# Patient Record
Sex: MALE | Race: WHITE | NOT HISPANIC OR LATINO | Employment: OTHER | URBAN - METROPOLITAN AREA
[De-identification: names, ages, dates, MRNs, and addresses within clinical notes are randomized per-mention and may not be internally consistent; named-entity substitution may affect disease eponyms.]

---

## 2017-01-30 DIAGNOSIS — E78.5 HYPERLIPIDEMIA, UNSPECIFIED HYPERLIPIDEMIA TYPE: Primary | ICD-10-CM

## 2017-01-30 DIAGNOSIS — J44.9 CHRONIC OBSTRUCTIVE PULMONARY DISEASE, UNSPECIFIED COPD TYPE (HCC): ICD-10-CM

## 2017-01-30 DIAGNOSIS — E03.8 SUBCLINICAL HYPOTHYROIDISM: ICD-10-CM

## 2017-01-30 DIAGNOSIS — I10 BENIGN ESSENTIAL HYPERTENSION: ICD-10-CM

## 2017-02-02 LAB
ALBUMIN SERPL-MCNC: 4 G/DL (ref 3.5–5.2)
ALBUMIN/GLOB SERPL: 1.3 G/DL
ALP SERPL-CCNC: 49 U/L (ref 39–117)
ALT SERPL-CCNC: 25 U/L (ref 1–41)
AST SERPL-CCNC: 35 U/L (ref 1–40)
BASOPHILS # BLD AUTO: 0.02 10*3/MM3 (ref 0–0.2)
BASOPHILS NFR BLD AUTO: 0.3 % (ref 0–1.5)
BILIRUB SERPL-MCNC: 1.2 MG/DL (ref 0.1–1.2)
BUN SERPL-MCNC: 17 MG/DL (ref 8–23)
BUN/CREAT SERPL: 20 (ref 7–25)
CALCIUM SERPL-MCNC: 9.3 MG/DL (ref 8.6–10.5)
CHLORIDE SERPL-SCNC: 100 MMOL/L (ref 98–107)
CHOLEST SERPL-MCNC: 144 MG/DL (ref 0–200)
CO2 SERPL-SCNC: 23.6 MMOL/L (ref 22–29)
CREAT SERPL-MCNC: 0.85 MG/DL (ref 0.76–1.27)
EOSINOPHIL # BLD AUTO: 0.64 10*3/MM3 (ref 0–0.7)
EOSINOPHIL NFR BLD AUTO: 11 % (ref 0.3–6.2)
ERYTHROCYTE [DISTWIDTH] IN BLOOD BY AUTOMATED COUNT: 14.5 % (ref 11.5–14.5)
GLOBULIN SER CALC-MCNC: 3.2 GM/DL
GLUCOSE SERPL-MCNC: 104 MG/DL (ref 65–99)
HCT VFR BLD AUTO: 44 % (ref 40.4–52.2)
HDLC SERPL-MCNC: 63 MG/DL (ref 40–60)
HGB BLD-MCNC: 15 G/DL (ref 13.7–17.6)
IMM GRANULOCYTES # BLD: 0 10*3/MM3 (ref 0–0.03)
IMM GRANULOCYTES NFR BLD: 0 % (ref 0–0.5)
LDLC SERPL CALC-MCNC: 71 MG/DL (ref 0–100)
LDLC/HDLC SERPL: 1.12 {RATIO}
LYMPHOCYTES # BLD AUTO: 1.88 10*3/MM3 (ref 0.9–4.8)
LYMPHOCYTES NFR BLD AUTO: 32.4 % (ref 19.6–45.3)
MCH RBC QN AUTO: 33 PG (ref 27–32.7)
MCHC RBC AUTO-ENTMCNC: 34.1 G/DL (ref 32.6–36.4)
MCV RBC AUTO: 96.9 FL (ref 79.8–96.2)
MONOCYTES # BLD AUTO: 1.16 10*3/MM3 (ref 0.2–1.2)
MONOCYTES NFR BLD AUTO: 20 % (ref 5–12)
NEUTROPHILS # BLD AUTO: 2.1 10*3/MM3 (ref 1.9–8.1)
NEUTROPHILS NFR BLD AUTO: 36.3 % (ref 42.7–76)
PLATELET # BLD AUTO: 279 10*3/MM3 (ref 140–500)
POTASSIUM SERPL-SCNC: 4.2 MMOL/L (ref 3.5–5.2)
PROT SERPL-MCNC: 7.2 G/DL (ref 6–8.5)
RBC # BLD AUTO: 4.54 10*6/MM3 (ref 4.6–6)
SODIUM SERPL-SCNC: 139 MMOL/L (ref 136–145)
TRIGL SERPL-MCNC: 52 MG/DL (ref 0–150)
TSH SERPL DL<=0.005 MIU/L-ACNC: 3.83 MIU/ML (ref 0.27–4.2)
VLDLC SERPL CALC-MCNC: 10.4 MG/DL (ref 5–40)
WBC # BLD AUTO: 5.8 10*3/MM3 (ref 4.5–10.7)

## 2017-02-07 ENCOUNTER — OFFICE VISIT (OUTPATIENT)
Dept: INTERNAL MEDICINE | Facility: CLINIC | Age: 82
End: 2017-02-07

## 2017-02-07 VITALS
DIASTOLIC BLOOD PRESSURE: 60 MMHG | WEIGHT: 208 LBS | SYSTOLIC BLOOD PRESSURE: 130 MMHG | BODY MASS INDEX: 29.01 KG/M2 | HEART RATE: 77 BPM | OXYGEN SATURATION: 98 %

## 2017-02-07 DIAGNOSIS — E78.5 HYPERLIPIDEMIA, UNSPECIFIED HYPERLIPIDEMIA TYPE: ICD-10-CM

## 2017-02-07 DIAGNOSIS — I10 BENIGN ESSENTIAL HYPERTENSION: ICD-10-CM

## 2017-02-07 DIAGNOSIS — R06.09 EXERTIONAL DYSPNEA: Primary | ICD-10-CM

## 2017-02-07 DIAGNOSIS — J44.9 CHRONIC OBSTRUCTIVE PULMONARY DISEASE, UNSPECIFIED COPD TYPE (HCC): ICD-10-CM

## 2017-02-07 PROCEDURE — 99214 OFFICE O/P EST MOD 30 MIN: CPT | Performed by: INTERNAL MEDICINE

## 2017-02-07 RX ORDER — FLUTICASONE PROPIONATE 50 MCG
2 SPRAY, SUSPENSION (ML) NASAL DAILY
Qty: 1 EACH | Refills: 0 | Status: SHIPPED | OUTPATIENT
Start: 2017-02-07 | End: 2017-03-09

## 2017-02-07 NOTE — PROGRESS NOTES
Chief Complaint   Patient presents with   • Hypertension   • COPD     Htn, copd, hyperlipidemia  History of Present Illness   Cuauhtemoc Buck is a 81 y.o. male presents for routine follow up evaluation. He has COPD. This is managed w/ multiple inhalers. He does have some sinus congestion today but denies sinus pain. He took levaquin for a sinus infection about one week ago w/ good benefit and is now almost resolved. Recently switched from spiriva/ symbicort to anoro. He reports that he does feel some exercise fatigue after changing medications. He engages in fitness routinely at [employer]. He is tracking his fitness and he is walking 11,00-17,000 steps daily w/ about 6.5-7 miles daily. Reports that he can not do this as fast as he used to due to some dyspnea. bp has been normotensive. Labs evaluated and lipids are at goal.         The following portions of the patient's history were reviewed and updated as appropriate: allergies, current medications, past family history, past medical history, past social history, past surgical history and problem list.  Current Outpatient Prescriptions on File Prior to Visit   Medication Sig Dispense Refill   • albuterol (PROVENTIL HFA) 108 (90 BASE) MCG/ACT inhaler Proventil  (90 Base) MCG/ACT Inhalation Aerosol Solution; Patient Sig: Proventil  (90 Base) MCG/ACT Inhalation Aerosol Solution INHALE TWO PUFFS BY MOUTH EVERY 6 HOURS AS NEEDED FOR SHORTNESS OF AIR; 60; 6; 27-Apr-2015; Active     • atorvastatin (LIPITOR) 20 MG tablet TAKE ONE TABLET BY MOUTH DAILY 30 tablet 3   • azelastine (OPTIVAR) 0.05 % ophthalmic solution Apply  to eye every 12 (twelve) hours.     • Coenzyme Q10 (CO Q-10) 100 MG capsule Take  by mouth.     • fluticasone-salmeterol (ADVAIR DISKUS) 250-50 MCG/DOSE DISKUS 2 (two) times a day.     • losartan-hydrochlorothiazide (HYZAAR) 100-12.5 MG per tablet TAKE ONE TABLET BY MOUTH DAILY 30 tablet 6   • nystatin (MYCOSTATIN) 483680 UNIT/ML suspension  Swish and swallow 5 mL 4 (four) times a day. 240 mL 0   • SPIRIVA HANDIHALER 18 MCG per inhalation capsule INHALE THE ENTIRE CONTENTS OF 1 CAPSULE ONCE A DAY USING HANDIHALER DEVICE 30 capsule 10   • vitamin B-12 (CYANOCOBALAMIN) 100 MCG tablet Take  by mouth.       No current facility-administered medications on file prior to visit.      Review of Systems   Constitutional: Negative.    HENT: Positive for rhinorrhea, sinus pressure and sore throat.    Eyes: Negative.    Respiratory: Positive for shortness of breath.    Cardiovascular: Negative.    Gastrointestinal: Negative.    Endocrine: Negative.    Genitourinary: Negative.    Musculoskeletal: Negative.    Skin: Negative.    Allergic/Immunologic: Negative.    Neurological: Negative.    Hematological: Negative.    Psychiatric/Behavioral: Negative.        Objective   Physical Exam   Constitutional: He is oriented to person, place, and time. He appears well-developed and well-nourished.   HENT:   Head: Normocephalic and atraumatic.   Right Ear: External ear normal.   Left Ear: External ear normal.   Nose: Nose normal.   Mouth/Throat: Oropharynx is clear and moist.   Nasal congestion  Pharyngeal erythema   Eyes: Conjunctivae and EOM are normal. Pupils are equal, round, and reactive to light.   Neck: Normal range of motion. Neck supple.   Cardiovascular: Normal rate, regular rhythm, normal heart sounds and intact distal pulses.    Pulmonary/Chest: Effort normal.   Stable/ clear   Abdominal: Soft. Bowel sounds are normal.   Musculoskeletal: Normal range of motion.   Neurological: He is alert and oriented to person, place, and time. He has normal reflexes.   Skin: Skin is warm and dry.   Psychiatric: He has a normal mood and affect. His behavior is normal. Judgment and thought content normal.   Nursing note and vitals reviewed.       Visit Vitals   • /60   • Pulse 77   • Wt 208 lb (94.3 kg)   • SpO2 98%   • BMI 29.01 kg/m2       Assessment/Plan   Diagnoses and all  orders for this visit:    Exertional dyspnea  -     Ambulatory Referral to Cardiology    Chronic obstructive pulmonary disease, unspecified COPD type    Hyperlipidemia, unspecified hyperlipidemia type    Benign essential hypertension    Other orders  -     fluticasone (FLONASE) 50 MCG/ACT nasal spray; 2 sprays into each nostril Daily for 30 days.        Patient w/ advanced copd. He is doing fairly well w/ his current regiment. Some recent exertional dyspnea. This could be to his change in inhalers. However, he has not had any recent cardiac testing and will have him follow up w/ his cardiologist to ensure this is not in play given h/o tob, htn, and hyperlip. He does have some sinus congestion and he is to restart using flonase to see if this helps w/ breathing. He will continue routine fitness as tolerated and continue his current medications. Labs reviewed. All at goal. Follow up routinely.

## 2017-02-28 ENCOUNTER — OFFICE VISIT (OUTPATIENT)
Dept: CARDIOLOGY | Facility: CLINIC | Age: 82
End: 2017-02-28

## 2017-02-28 VITALS
WEIGHT: 205 LBS | HEIGHT: 71 IN | DIASTOLIC BLOOD PRESSURE: 70 MMHG | HEART RATE: 73 BPM | SYSTOLIC BLOOD PRESSURE: 110 MMHG | RESPIRATION RATE: 16 BRPM | BODY MASS INDEX: 28.7 KG/M2

## 2017-02-28 DIAGNOSIS — J44.9 CHRONIC OBSTRUCTIVE PULMONARY DISEASE, UNSPECIFIED COPD TYPE (HCC): ICD-10-CM

## 2017-02-28 DIAGNOSIS — I10 BENIGN ESSENTIAL HYPERTENSION: ICD-10-CM

## 2017-02-28 DIAGNOSIS — E78.5 HYPERLIPIDEMIA, UNSPECIFIED HYPERLIPIDEMIA TYPE: ICD-10-CM

## 2017-02-28 DIAGNOSIS — R06.09 DYSPNEA ON EXERTION: Primary | ICD-10-CM

## 2017-02-28 PROCEDURE — 93000 ELECTROCARDIOGRAM COMPLETE: CPT | Performed by: INTERNAL MEDICINE

## 2017-02-28 PROCEDURE — 99203 OFFICE O/P NEW LOW 30 MIN: CPT | Performed by: INTERNAL MEDICINE

## 2017-04-25 RX ORDER — ATORVASTATIN CALCIUM 20 MG/1
TABLET, FILM COATED ORAL
Qty: 30 TABLET | Refills: 2 | Status: SHIPPED | OUTPATIENT
Start: 2017-04-25 | End: 2017-07-24 | Stop reason: SDUPTHER

## 2017-05-30 RX ORDER — LOSARTAN POTASSIUM AND HYDROCHLOROTHIAZIDE 12.5; 1 MG/1; MG/1
TABLET ORAL
Qty: 30 TABLET | Refills: 5 | Status: SHIPPED | OUTPATIENT
Start: 2017-05-30 | End: 2017-12-05 | Stop reason: SDUPTHER

## 2017-06-27 RX ORDER — TIOTROPIUM BROMIDE 18 UG/1
CAPSULE ORAL; RESPIRATORY (INHALATION)
Qty: 30 CAPSULE | Refills: 9 | Status: SHIPPED | OUTPATIENT
Start: 2017-06-27 | End: 2018-11-17 | Stop reason: SDUPTHER

## 2017-07-23 DIAGNOSIS — Z00.00 HEALTHCARE MAINTENANCE: ICD-10-CM

## 2017-07-23 DIAGNOSIS — E78.5 HYPERLIPIDEMIA, UNSPECIFIED HYPERLIPIDEMIA TYPE: Primary | ICD-10-CM

## 2017-07-23 DIAGNOSIS — R73.9 HYPERGLYCEMIA: ICD-10-CM

## 2017-07-23 DIAGNOSIS — I10 BENIGN ESSENTIAL HYPERTENSION: ICD-10-CM

## 2017-07-23 DIAGNOSIS — J44.9 CHRONIC OBSTRUCTIVE PULMONARY DISEASE, UNSPECIFIED COPD TYPE (HCC): ICD-10-CM

## 2017-07-23 DIAGNOSIS — N40.0 BENIGN PROSTATIC HYPERPLASIA, PRESENCE OF LOWER URINARY TRACT SYMPTOMS UNSPECIFIED, UNSPECIFIED MORPHOLOGY: ICD-10-CM

## 2017-07-24 RX ORDER — ATORVASTATIN CALCIUM 20 MG/1
TABLET, FILM COATED ORAL
Qty: 30 TABLET | Refills: 1 | Status: SHIPPED | OUTPATIENT
Start: 2017-07-24 | End: 2017-10-22 | Stop reason: SDUPTHER

## 2017-07-25 LAB
ALBUMIN SERPL-MCNC: 4.3 G/DL (ref 3.5–5.2)
ALBUMIN/GLOB SERPL: 1.3 G/DL
ALP SERPL-CCNC: 50 U/L (ref 39–117)
ALT SERPL-CCNC: 37 U/L (ref 1–41)
AST SERPL-CCNC: 44 U/L (ref 1–40)
BILIRUB SERPL-MCNC: 0.7 MG/DL (ref 0.1–1.2)
BUN SERPL-MCNC: 17 MG/DL (ref 8–23)
BUN/CREAT SERPL: 17.5 (ref 7–25)
CALCIUM SERPL-MCNC: 9.9 MG/DL (ref 8.6–10.5)
CHLORIDE SERPL-SCNC: 101 MMOL/L (ref 98–107)
CHOLEST SERPL-MCNC: 131 MG/DL (ref 0–200)
CO2 SERPL-SCNC: 23.1 MMOL/L (ref 22–29)
CREAT SERPL-MCNC: 0.97 MG/DL (ref 0.76–1.27)
GLOBULIN SER CALC-MCNC: 3.4 GM/DL
GLUCOSE SERPL-MCNC: 100 MG/DL (ref 65–99)
HBA1C MFR BLD: 5.55 % (ref 4.8–5.6)
HDLC SERPL-MCNC: 74 MG/DL (ref 40–60)
LDLC SERPL CALC-MCNC: 50 MG/DL (ref 0–100)
LDLC/HDLC SERPL: 0.68 {RATIO}
POTASSIUM SERPL-SCNC: 4.3 MMOL/L (ref 3.5–5.2)
PROT SERPL-MCNC: 7.7 G/DL (ref 6–8.5)
PSA SERPL-MCNC: 0.51 NG/ML (ref 0–4)
SODIUM SERPL-SCNC: 139 MMOL/L (ref 136–145)
TRIGL SERPL-MCNC: 35 MG/DL (ref 0–150)
VLDLC SERPL CALC-MCNC: 7 MG/DL (ref 5–40)

## 2017-07-25 NOTE — PROGRESS NOTES
Your laboratory results are NORMAL/ stable. We will discuss these results in detail at your next office visit. Please call with any questions or concerns.  Sincerely,  Padmini keane MD

## 2017-08-07 ENCOUNTER — OFFICE VISIT (OUTPATIENT)
Dept: INTERNAL MEDICINE | Facility: CLINIC | Age: 82
End: 2017-08-07

## 2017-08-07 VITALS
BODY MASS INDEX: 29.01 KG/M2 | SYSTOLIC BLOOD PRESSURE: 126 MMHG | HEART RATE: 65 BPM | OXYGEN SATURATION: 94 % | WEIGHT: 208 LBS | DIASTOLIC BLOOD PRESSURE: 60 MMHG

## 2017-08-07 DIAGNOSIS — Z00.00 HEALTHCARE MAINTENANCE: ICD-10-CM

## 2017-08-07 DIAGNOSIS — E78.5 HYPERLIPIDEMIA, UNSPECIFIED HYPERLIPIDEMIA TYPE: ICD-10-CM

## 2017-08-07 DIAGNOSIS — J44.9 CHRONIC OBSTRUCTIVE PULMONARY DISEASE, UNSPECIFIED COPD TYPE (HCC): ICD-10-CM

## 2017-08-07 DIAGNOSIS — I10 BENIGN ESSENTIAL HYPERTENSION: Primary | ICD-10-CM

## 2017-08-07 PROCEDURE — 90732 PPSV23 VACC 2 YRS+ SUBQ/IM: CPT | Performed by: INTERNAL MEDICINE

## 2017-08-07 PROCEDURE — G0439 PPPS, SUBSEQ VISIT: HCPCS | Performed by: INTERNAL MEDICINE

## 2017-08-07 PROCEDURE — G0009 ADMIN PNEUMOCOCCAL VACCINE: HCPCS | Performed by: INTERNAL MEDICINE

## 2017-08-07 PROCEDURE — 96160 PT-FOCUSED HLTH RISK ASSMT: CPT | Performed by: INTERNAL MEDICINE

## 2017-08-07 PROCEDURE — 99213 OFFICE O/P EST LOW 20 MIN: CPT | Performed by: INTERNAL MEDICINE

## 2017-08-07 NOTE — PROGRESS NOTES
Subjective   AWV, advanced COPD, htn, hyperlipidemia  Cuauhtemoc Buck is a 82 y.o. male who presents for an annual wellness visit.  He is doing well today. He has O2 dependent COPD. This has been stable on inhalers and supplemental oxygen. He has med managed htn and hyperlipidemia. Reports some peripheral edema. Right >Left. He is wearing compression stockings daily. He has completed pulm rehab and continues to go to the gym daily. He is tolerating 2-3 miles daily or more on the treadmill with 15,00-19,000 steps daily.  Lipid control is excellent. Echo and cardiolyte tested 2013. Normal ekg 2017.         Review of Systems   Constitutional: Negative.    HENT: Positive for hearing loss and postnasal drip.    Eyes: Negative.    Respiratory: Positive for shortness of breath.         Stable copd   Cardiovascular: Positive for leg swelling.   Gastrointestinal: Negative.    Endocrine: Negative.    Genitourinary: Negative.    Musculoskeletal: Positive for arthralgias.   Skin: Negative.    Allergic/Immunologic: Negative.    Neurological: Negative.    Hematological: Negative.    Psychiatric/Behavioral: Negative.        The following portions of the patient's history were reviewed and updated as appropriate: allergies, current medications, past family history, past medical history, past social history, past surgical history and problem list.     Patient Active Problem List   Diagnosis   • Benign essential hypertension   • Chronic obstructive pulmonary disease   • Hyperlipidemia   • Shoulder joint pain   • Subclinical hypothyroidism   • Exertional dyspnea       Past Medical History:   Diagnosis Date   • Abnormal CT scan    • Acute back pain    • Acute sinusitis    • Allergic conjunctivitis    • Allergic rhinitis    • Benign essential hypertension    • Bronchitis    • COPD (chronic obstructive pulmonary disease)    • TRINH (dyspnea on exertion)    • Dyspnea    • Emphysema lung    • Fatigue    • Hyperlipidemia    • Hypertension    •  EDILMA (obstructive sleep apnea)    • Pain in joint of left shoulder    • Palpitations    • Polyp, sigmoid colon    • Sleep apnea    • SOBOE (shortness of breath on exertion)    • Subclinical hypothyroidism    • Tachycardia     sudden   • Trigger finger        Past Surgical History:   Procedure Laterality Date   • COLONOSCOPY N/A 10/28/2016    Procedure: COLONOSCOPY INTO CECUM WITH POLYPECTOMY X 2;  Surgeon: Crali Khanna MD;  Location: [facility] ENDOSCOPY;  Service:    • CYST REMOVAL  1960       Family History   Problem Relation Age of Onset   • Heart disease Mother    • Diabetes Mother    • Hypertension Mother    • Stroke Mother    • Cancer Father    • Hypertension Father    • Heart disease Brother        Social History     Social History   • Marital status:      Spouse name: N/A   • Number of children: N/A   • Years of education: N/A     Occupational History   • Not on file.     Social History Main Topics   • Smoking status: Former Smoker     Packs/day: 1.50     Years: 35.00     Types: Cigarettes     Quit date: 2005   • Smokeless tobacco: Not on file      Comment: caffeine use   • Alcohol use Yes      Comment: 5-7 beer/liquor drinks a week   • Drug use: No   • Sexual activity: Defer     Other Topics Concern   • Not on file     Social History Narrative       Current Outpatient Prescriptions on File Prior to Visit   Medication Sig Dispense Refill   • albuterol (PROVENTIL HFA) 108 (90 BASE) MCG/ACT inhaler Proventil  (90 Base) MCG/ACT Inhalation Aerosol Solution; Patient Sig: Proventil  (90 Base) MCG/ACT Inhalation Aerosol Solution INHALE TWO PUFFS BY MOUTH EVERY 6 HOURS AS NEEDED FOR SHORTNESS OF AIR; 60; 6; 27-Apr-2015; Active     • atorvastatin (LIPITOR) 20 MG tablet TAKE ONE TABLET BY MOUTH DAILY 30 tablet 1   • Coenzyme Q10 (CO Q-10) 100 MG capsule Take  by mouth.     • losartan-hydrochlorothiazide (HYZAAR) 100-12.5 MG per tablet TAKE ONE TABLET BY MOUTH DAILY 30 tablet 5   • SPIRIVA  HANDIHALER 18 MCG per inhalation capsule INHALE ONE CAPSULE BY MOUTH DAILY 30 capsule 9   • vitamin B-12 (CYANOCOBALAMIN) 100 MCG tablet Take  by mouth.       No current facility-administered medications on file prior to visit.        No Known Allergies    Immunization History   Administered Date(s) Administered   • Pneumococcal Conjugate 13-Valent 04/13/2015   • Pneumococcal Polysaccharide 08/17/2007   • Tdap 04/17/2009   • Zoster 04/17/2009       Objective     /60  Pulse 65  Wt 208 lb (94.3 kg)  SpO2 94%  BMI 29.01 kg/m2    Physical Exam   Constitutional: He is oriented to person, place, and time. He appears well-developed and well-nourished.   HENT:   Head: Normocephalic and atraumatic.   Right Ear: External ear normal.   Left Ear: External ear normal.   Nose: Nose normal.   Mouth/Throat: Oropharynx is clear and moist.   Eyes: EOM are normal. Pupils are equal, round, and reactive to light.   Neck: Neck supple.   Cardiovascular: Normal rate, regular rhythm, normal heart sounds and intact distal pulses.    + peripheral edema   Pulmonary/Chest: Effort normal and breath sounds normal. No respiratory distress.   Abdominal: Soft.   Musculoskeletal: Normal range of motion.   Neurological: He is alert and oriented to person, place, and time.   Skin: Skin is warm and dry.   Small red annular skin changes left leg  onchytic changed toe nails B feet   Psychiatric: He has a normal mood and affect. His behavior is normal. Judgment and thought content normal.   Nursing note and vitals reviewed.      Assessment/Plan   Cuauhtemoc was seen today for annual exam.    Diagnoses and all orders for this visit:    Benign essential hypertension    Healthcare maintenance    Hyperlipidemia, unspecified hyperlipidemia type    Other orders  -     fluticasone-salmeterol (ADVAIR DISKUS) 500-50 MCG/DOSE DISKUS; Inhale 1 puff 2 (Two) Times a Day.        Discussion    AWV.  See scanned forms for jie history, PHQ-9, functional ability  questionnaire, cognitive impairment screening and preventive services check list.  These were all reviewed with the patient and the patient was provided with a copy of the preventative services checklist. He displays no cognitive impairment today.   Patient w/ COPD. He is really doing well w/ fitness after completing pulm rehab. He is encouraged to continue a healthy diet and routine fitness at his facility. Offered pulm rehab but no longer feels he needs this. He will continue w/ spiriva and advair but could consider switching to other combined for cost and simplicity. Will boost pneumovax today as it has been 10 years. He is current on prevnar. Flu vac once available.  He will cotninue hyzaar for bp and lipitor for lipid regulation as these are both at goal. Continue oral b12 replacement. To derm for small skin lesion on calf. F/u in 6 mo or prn. He has a copy of his living will and will submit this.               No future appointments.

## 2017-08-14 NOTE — PATIENT INSTRUCTIONS
Medicare Wellness  Personal Prevention Plan of Service     Date of Office Visit:  2017  Encounter Provider:  Padmini Jha MD  Place of Service:  [facility] INTER[facility] MED[facility]  Patient Name: Cuauhtemoc Buck  :  1935    As part of the Medicare Wellness portion of your visit today, we are providing you with this personalized preventive plan of services (PPPS). This plan is based upon recommendations of the [place] Preventive Services Task Force (USPSTF) and the Advisory Committee on Immunization Practices (ACIP).    This lists the preventive care services that should be considered, and provides dates of when you are due. Items listed as completed are up-to-date and do not require any further intervention.    Health Maintenance   Topic Date Due   • INFLUENZA VACCINE  2017   • LIPID PANEL  2018   • MEDICARE ANNUAL WELLNESS  2018   • TDAP/TD VACCINES (2 - Td) 2019   • PNEUMOCOCCAL VACCINES (65+ LOW/MEDIUM RISK)  Completed   • ZOSTER VACCINE  Completed       Orders Placed This Encounter   Procedures   • Pneumococcal Polysaccharide Vaccine 23-Valent Greater Than or Equal To 3yo Subcutaneous / IM       Return in about 6 months (around 2018) for Recheck.

## 2017-10-09 RX ORDER — OSELTAMIVIR PHOSPHATE 75 MG/1
75 CAPSULE ORAL 2 TIMES DAILY
Qty: 10 CAPSULE | Refills: 0 | Status: SHIPPED | OUTPATIENT
Start: 2017-10-09 | End: 2018-01-29

## 2017-10-23 RX ORDER — ATORVASTATIN CALCIUM 20 MG/1
TABLET, FILM COATED ORAL
Qty: 30 TABLET | Refills: 6 | Status: SHIPPED | OUTPATIENT
Start: 2017-10-23 | End: 2018-05-19 | Stop reason: SDUPTHER

## 2017-12-05 RX ORDER — LOSARTAN POTASSIUM AND HYDROCHLOROTHIAZIDE 12.5; 1 MG/1; MG/1
TABLET ORAL
Qty: 90 TABLET | Refills: 1 | Status: SHIPPED | OUTPATIENT
Start: 2017-12-05 | End: 2018-06-01 | Stop reason: SDUPTHER

## 2018-01-29 ENCOUNTER — OFFICE VISIT (OUTPATIENT)
Dept: INTERNAL MEDICINE | Facility: CLINIC | Age: 83
End: 2018-01-29

## 2018-01-29 VITALS
DIASTOLIC BLOOD PRESSURE: 78 MMHG | HEART RATE: 79 BPM | WEIGHT: 206 LBS | BODY MASS INDEX: 28.73 KG/M2 | OXYGEN SATURATION: 90 % | SYSTOLIC BLOOD PRESSURE: 150 MMHG

## 2018-01-29 DIAGNOSIS — J10.1 INFLUENZA A: Primary | ICD-10-CM

## 2018-01-29 LAB
EXPIRATION DATE: NORMAL
FLUAV AG NPH QL: NORMAL
FLUBV AG NPH QL: NORMAL
INTERNAL CONTROL: NORMAL
Lab: NORMAL

## 2018-01-29 PROCEDURE — 99213 OFFICE O/P EST LOW 20 MIN: CPT | Performed by: INTERNAL MEDICINE

## 2018-01-29 PROCEDURE — 87804 INFLUENZA ASSAY W/OPTIC: CPT | Performed by: INTERNAL MEDICINE

## 2018-01-29 PROCEDURE — 94640 AIRWAY INHALATION TREATMENT: CPT | Performed by: INTERNAL MEDICINE

## 2018-01-29 RX ORDER — OSELTAMIVIR PHOSPHATE 75 MG/1
75 CAPSULE ORAL 2 TIMES DAILY
Qty: 10 CAPSULE | Refills: 0 | Status: SHIPPED | OUTPATIENT
Start: 2018-01-29 | End: 2018-03-16

## 2018-01-29 RX ORDER — METHYLPREDNISOLONE 4 MG/1
TABLET ORAL
Qty: 21 EACH | Refills: 0 | Status: SHIPPED | OUTPATIENT
Start: 2018-01-29 | End: 2018-05-22

## 2018-01-29 RX ORDER — DEXTROMETHORPHAN HYDROBROMIDE AND PROMETHAZINE HYDROCHLORIDE 15; 6.25 MG/5ML; MG/5ML
5 SYRUP ORAL 4 TIMES DAILY PRN
Qty: 150 ML | Refills: 0 | Status: SHIPPED | OUTPATIENT
Start: 2018-01-29 | End: 2018-05-22

## 2018-01-29 RX ORDER — LEVALBUTEROL INHALATION SOLUTION 0.63 MG/3ML
0.63 SOLUTION RESPIRATORY (INHALATION) EVERY 6 HOURS PRN
Status: SHIPPED | OUTPATIENT
Start: 2018-01-29

## 2018-01-29 RX ADMIN — LEVALBUTEROL INHALATION SOLUTION 0.63 MG: 0.63 SOLUTION RESPIRATORY (INHALATION) at 13:33

## 2018-01-29 NOTE — PROGRESS NOTES
Chief Complaint   Patient presents with   • Hypertension   • Cough   • Nasal Congestion     Cough congestion and dyspnea.   History of Present Illness   Cuauhtemoc Buck is a 82 y.o. male presents for acute care. Brings his wife in for a hospital follow up. Reports he has been feeling poorly since 1 am this morning. Issue is new to this office. Sinus congestion. Woke this morning w/ difficulty breathing. He has a cough. Feels that he has a lot of drainage. Feeling poorly starting this am.     The following portions of the patient's history were reviewed and updated as appropriate: allergies, current medications, past family history, past medical history, past social history, past surgical history and problem list.  Current Outpatient Prescriptions on File Prior to Visit   Medication Sig Dispense Refill   • albuterol (PROVENTIL HFA) 108 (90 BASE) MCG/ACT inhaler Proventil  (90 Base) MCG/ACT Inhalation Aerosol Solution; Patient Sig: Proventil  (90 Base) MCG/ACT Inhalation Aerosol Solution INHALE TWO PUFFS BY MOUTH EVERY 6 HOURS AS NEEDED FOR SHORTNESS OF AIR; 60; 6; 27-Apr-2015; Active     • atorvastatin (LIPITOR) 20 MG tablet TAKE ONE TABLET BY MOUTH DAILY 30 tablet 6   • Coenzyme Q10 (CO Q-10) 100 MG capsule Take  by mouth.     • fluticasone-salmeterol (ADVAIR DISKUS) 500-50 MCG/DOSE DISKUS Inhale 1 puff 2 (Two) Times a Day. 60 each 3   • losartan-hydrochlorothiazide (HYZAAR) 100-12.5 MG per tablet TAKE ONE TABLET BY MOUTH DAILY 90 tablet 1   • SPIRIVA HANDIHALER 18 MCG per inhalation capsule INHALE ONE CAPSULE BY MOUTH DAILY 30 capsule 9   • vitamin B-12 (CYANOCOBALAMIN) 100 MCG tablet Take  by mouth.     • [DISCONTINUED] oseltamivir (TAMIFLU) 75 MG capsule Take 1 capsule by mouth 2 (Two) Times a Day. 10 capsule 0     No current facility-administered medications on file prior to visit.      Review of Systems   Constitutional: Positive for fatigue and fever.   HENT: Positive for rhinorrhea, sinus pain,  sinus pressure, sneezing and sore throat.    Eyes: Negative.    Respiratory: Positive for cough, shortness of breath and wheezing.    Cardiovascular: Negative.    Gastrointestinal: Negative.    Endocrine: Negative.    Genitourinary: Negative.    Musculoskeletal: Negative.    Skin: Negative.    Allergic/Immunologic: Negative.    Neurological: Negative.    Hematological: Negative.    Psychiatric/Behavioral: Negative.        Objective   Physical Exam   Constitutional: He is oriented to person, place, and time. He appears well-developed and well-nourished.   Ill appearing. Congested. No acute distress.    HENT:   Head: Normocephalic and atraumatic.   Pharyngeal erythema   Eyes: EOM are normal. Pupils are equal, round, and reactive to light.   Neck: Normal range of motion. Neck supple.   Cardiovascular: Normal rate, regular rhythm, normal heart sounds and intact distal pulses.    Pulmonary/Chest: Effort normal.   Scattered rhonchi.   Abdominal: Soft. Bowel sounds are normal.   Musculoskeletal: Normal range of motion.   Neurological: He is alert and oriented to person, place, and time. He has normal reflexes.   Skin: Skin is warm and dry.   Psychiatric: He has a normal mood and affect. His behavior is normal. Judgment and thought content normal.   Nursing note and vitals reviewed.   influenza +    /78  Pulse 79  Wt 93.4 kg (206 lb)  SpO2 90%  BMI 28.73 kg/m2    Assessment/Plan   Diagnoses and all orders for this visit:    Influenza A  -     POC Influenza A / B  -     levalbuterol (XOPENEX) nebulizer solution 0.63 mg; Take 3 mL by nebulization Every 6 (Six) Hours As Needed for Wheezing.    Other orders  -     oseltamivir (TAMIFLU) 75 MG capsule; Take 1 capsule by mouth 2 (Two) Times a Day.  -     MethylPREDNISolone (MEDROL, BRENDA,) 4 MG tablet; Take as directed on package instructions.  -     promethazine-dextromethorphan (PROMETHAZINE-DM) 6.25-15 MG/5ML syrup; Take 5 mL by mouth 4 (Four) Times a Day As Needed for  Cough.      Patient tested positive for flu. Given copd and difficultybreathing will give tamiflu as well as a medrol dose pack. xopenex neb also administered w/ modest benefit. Prn phenergan cough v robitussin dm. F/u prn.

## 2018-03-16 ENCOUNTER — OFFICE VISIT (OUTPATIENT)
Dept: INTERNAL MEDICINE | Facility: CLINIC | Age: 83
End: 2018-03-16

## 2018-03-16 VITALS
DIASTOLIC BLOOD PRESSURE: 64 MMHG | BODY MASS INDEX: 29.29 KG/M2 | HEART RATE: 72 BPM | WEIGHT: 210 LBS | OXYGEN SATURATION: 97 % | SYSTOLIC BLOOD PRESSURE: 124 MMHG

## 2018-03-16 DIAGNOSIS — E03.8 SUBCLINICAL HYPOTHYROIDISM: ICD-10-CM

## 2018-03-16 DIAGNOSIS — E78.5 HYPERLIPIDEMIA, UNSPECIFIED HYPERLIPIDEMIA TYPE: ICD-10-CM

## 2018-03-16 DIAGNOSIS — J44.9 CHRONIC OBSTRUCTIVE PULMONARY DISEASE, UNSPECIFIED COPD TYPE (HCC): ICD-10-CM

## 2018-03-16 DIAGNOSIS — R32 URINARY INCONTINENCE, UNSPECIFIED TYPE: Primary | ICD-10-CM

## 2018-03-16 DIAGNOSIS — I10 BENIGN ESSENTIAL HYPERTENSION: ICD-10-CM

## 2018-03-16 LAB
BILIRUB BLD-MCNC: NEGATIVE MG/DL
CLARITY, POC: CLEAR
COLOR UR: YELLOW
GLUCOSE UR STRIP-MCNC: NEGATIVE MG/DL
KETONES UR QL: NEGATIVE
LEUKOCYTE EST, POC: NEGATIVE
NITRITE UR-MCNC: POSITIVE MG/ML
PH UR: 6.5 [PH] (ref 5–8)
PROT UR STRIP-MCNC: NEGATIVE MG/DL
RBC # UR STRIP: NEGATIVE /UL
SP GR UR: 1.01 (ref 1–1.03)
UROBILINOGEN UR QL: ABNORMAL

## 2018-03-16 PROCEDURE — 99214 OFFICE O/P EST MOD 30 MIN: CPT | Performed by: INTERNAL MEDICINE

## 2018-03-16 PROCEDURE — 81003 URINALYSIS AUTO W/O SCOPE: CPT | Performed by: INTERNAL MEDICINE

## 2018-03-16 RX ORDER — NITROFURANTOIN 25; 75 MG/1; MG/1
100 CAPSULE ORAL EVERY 12 HOURS SCHEDULED
Qty: 14 CAPSULE | Refills: 0 | Status: SHIPPED | OUTPATIENT
Start: 2018-03-16 | End: 2018-05-22

## 2018-03-16 NOTE — PROGRESS NOTES
Chief Complaint   Patient presents with   • Bladder Problem     leakage     Htn, hyperlipidemia, htn, copd.   History of Present Illness   Cuauhtemoc Buck is a 82 y.o. male presents for acute care. Patient reports new onset of bladder leakage. When he stands up he then has difficulty controlling his bladder. Drinking about 4 cups of coffee in the morning then occasionally another cup or two during the day. He is now wearing an adult undergarment for leakage. This is changed about once daily. Started this about 2 weeks ago.   Patient has htn and hyperlipidemia. This is stable at this time but due to assess labs. Has copd. Breathing is stable at this time.     The following portions of the patient's history were reviewed and updated as appropriate: allergies, current medications, past family history, past medical history, past social history, past surgical history and problem list.  Current Outpatient Prescriptions on File Prior to Visit   Medication Sig Dispense Refill   • albuterol (PROVENTIL HFA) 108 (90 BASE) MCG/ACT inhaler Proventil  (90 Base) MCG/ACT Inhalation Aerosol Solution; Patient Sig: Proventil  (90 Base) MCG/ACT Inhalation Aerosol Solution INHALE TWO PUFFS BY MOUTH EVERY 6 HOURS AS NEEDED FOR SHORTNESS OF AIR; 60; 6; 27-Apr-2015; Active     • atorvastatin (LIPITOR) 20 MG tablet TAKE ONE TABLET BY MOUTH DAILY 30 tablet 6   • Coenzyme Q10 (CO Q-10) 100 MG capsule Take  by mouth.     • fluticasone-salmeterol (ADVAIR DISKUS) 500-50 MCG/DOSE DISKUS Inhale 1 puff 2 (Two) Times a Day. 60 each 3   • losartan-hydrochlorothiazide (HYZAAR) 100-12.5 MG per tablet TAKE ONE TABLET BY MOUTH DAILY 90 tablet 1   • MethylPREDNISolone (MEDROL, BRENDA,) 4 MG tablet Take as directed on package instructions. 21 each 0   • SPIRIVA HANDIHALER 18 MCG per inhalation capsule INHALE ONE CAPSULE BY MOUTH DAILY 30 capsule 9   • vitamin B-12 (CYANOCOBALAMIN) 100 MCG tablet Take  by mouth.     • promethazine-dextromethorphan  (PROMETHAZINE-DM) 6.25-15 MG/5ML syrup Take 5 mL by mouth 4 (Four) Times a Day As Needed for Cough. 150 mL 0   • [DISCONTINUED] oseltamivir (TAMIFLU) 75 MG capsule Take 1 capsule by mouth 2 (Two) Times a Day. 10 capsule 0     Current Facility-Administered Medications on File Prior to Visit   Medication Dose Route Frequency Provider Last Rate Last Dose   • levalbuterol (XOPENEX) nebulizer solution 0.63 mg  0.63 mg Nebulization Q6H PRN Padmini Jha MD   0.63 mg at 01/29/18 1333     Review of Systems   Constitutional: Negative.    HENT: Negative.    Eyes: Negative.    Respiratory: Positive for shortness of breath.    Cardiovascular: Negative.    Gastrointestinal: Negative.    Endocrine: Negative.    Genitourinary: Positive for frequency and urgency.   Musculoskeletal: Positive for arthralgias.   Skin: Negative.    Allergic/Immunologic: Negative.    Neurological: Negative.    Hematological: Negative.    Psychiatric/Behavioral: Negative.        Objective   Physical Exam   Constitutional: He is oriented to person, place, and time. He appears well-developed and well-nourished.   HENT:   Head: Normocephalic and atraumatic.   Right Ear: External ear normal.   Left Ear: External ear normal.   Eyes: EOM are normal. Pupils are equal, round, and reactive to light.   Neck: Normal range of motion. Neck supple.   Cardiovascular: Normal rate, regular rhythm and normal heart sounds.    Pulmonary/Chest: Effort normal.   02 dependent. Distant breath sounds.    Abdominal: Soft. Bowel sounds are normal.   Musculoskeletal: Normal range of motion.   Neurological: He is alert and oriented to person, place, and time.   Skin: Skin is warm and dry.   Psychiatric: He has a normal mood and affect. His behavior is normal. Thought content normal.   Nursing note and vitals reviewed.       /64   Pulse 72   Wt 95.3 kg (210 lb)   SpO2 97%   BMI 29.29 kg/m²     Assessment/Plan   Diagnoses and all orders for this visit:    Urinary  "incontinence, unspecified type  -     POC Urinalysis Dipstick, Automated    Hyperlipidemia, unspecified hyperlipidemia type  -     Comprehensive Metabolic Panel  -     Lipid Panel With LDL / HDL Ratio    Benign essential hypertension  -     Comprehensive Metabolic Panel    Chronic obstructive pulmonary disease, unspecified COPD type    Subclinical hypothyroidism  -     CBC & Differential  -     TSH    Other orders  -     fluticasone-salmeterol (ADVAIR DISKUS) 250-50 MCG/DOSE DISKUS; Inhale 2 (Two) Times a Day.  -     Cancel: Ambulatory Referral to Diabetic Education  -     nitrofurantoin, macrocrystal-monohydrate, (MACROBID) 100 MG capsule; Take 1 capsule by mouth Every 12 (Twelve) Hours.      Patient w/ bladder leakage. Nitrite + on urine testing. Will send for culture. macrobid 100 bid x 7 days for possible infection. To reduce caffeine and etoh. Gave booklet on \"bladder matters\". Will consider med for either bph or oab if persistent symptoms. Will test listed labs for review of chronic issues. Routine follow up.            "

## 2018-03-17 LAB
ALBUMIN SERPL-MCNC: 3.9 G/DL (ref 3.5–5.2)
ALBUMIN/GLOB SERPL: 1.1 G/DL
ALP SERPL-CCNC: 50 U/L (ref 39–117)
ALT SERPL-CCNC: 34 U/L (ref 1–41)
AST SERPL-CCNC: 37 U/L (ref 1–40)
BASOPHILS # BLD AUTO: 0.02 10*3/MM3 (ref 0–0.2)
BASOPHILS NFR BLD AUTO: 0.3 % (ref 0–1.5)
BILIRUB SERPL-MCNC: 1.1 MG/DL (ref 0.1–1.2)
BUN SERPL-MCNC: 15 MG/DL (ref 8–23)
BUN/CREAT SERPL: 18.3 (ref 7–25)
CALCIUM SERPL-MCNC: 9.7 MG/DL (ref 8.6–10.5)
CHLORIDE SERPL-SCNC: 98 MMOL/L (ref 98–107)
CHOLEST SERPL-MCNC: 137 MG/DL (ref 0–200)
CO2 SERPL-SCNC: 24.8 MMOL/L (ref 22–29)
CREAT SERPL-MCNC: 0.82 MG/DL (ref 0.76–1.27)
EOSINOPHIL # BLD AUTO: 0.28 10*3/MM3 (ref 0–0.7)
EOSINOPHIL NFR BLD AUTO: 4 % (ref 0.3–6.2)
ERYTHROCYTE [DISTWIDTH] IN BLOOD BY AUTOMATED COUNT: 14.8 % (ref 11.5–14.5)
GFR SERPLBLD CREATININE-BSD FMLA CKD-EPI: 110 ML/MIN/1.73
GFR SERPLBLD CREATININE-BSD FMLA CKD-EPI: 90 ML/MIN/1.73
GLOBULIN SER CALC-MCNC: 3.4 GM/DL
GLUCOSE SERPL-MCNC: 96 MG/DL (ref 65–99)
HCT VFR BLD AUTO: 44.2 % (ref 40.4–52.2)
HDLC SERPL-MCNC: 68 MG/DL (ref 40–60)
HGB BLD-MCNC: 14.9 G/DL (ref 13.7–17.6)
IMM GRANULOCYTES # BLD: 0 10*3/MM3 (ref 0–0.03)
IMM GRANULOCYTES NFR BLD: 0 % (ref 0–0.5)
LDLC SERPL CALC-MCNC: 60 MG/DL (ref 0–100)
LDLC/HDLC SERPL: 0.88 {RATIO}
LYMPHOCYTES # BLD AUTO: 2.08 10*3/MM3 (ref 0.9–4.8)
LYMPHOCYTES NFR BLD AUTO: 29.8 % (ref 19.6–45.3)
MCH RBC QN AUTO: 33.9 PG (ref 27–32.7)
MCHC RBC AUTO-ENTMCNC: 33.7 G/DL (ref 32.6–36.4)
MCV RBC AUTO: 100.5 FL (ref 79.8–96.2)
MONOCYTES # BLD AUTO: 1.02 10*3/MM3 (ref 0.2–1.2)
MONOCYTES NFR BLD AUTO: 14.6 % (ref 5–12)
NEUTROPHILS # BLD AUTO: 3.57 10*3/MM3 (ref 1.9–8.1)
NEUTROPHILS NFR BLD AUTO: 51.3 % (ref 42.7–76)
PLATELET # BLD AUTO: 284 10*3/MM3 (ref 140–500)
POTASSIUM SERPL-SCNC: 4.4 MMOL/L (ref 3.5–5.2)
PROT SERPL-MCNC: 7.3 G/DL (ref 6–8.5)
RBC # BLD AUTO: 4.4 10*6/MM3 (ref 4.6–6)
SODIUM SERPL-SCNC: 137 MMOL/L (ref 136–145)
TRIGL SERPL-MCNC: 47 MG/DL (ref 0–150)
TSH SERPL DL<=0.005 MIU/L-ACNC: 3.33 MIU/ML (ref 0.27–4.2)
VLDLC SERPL CALC-MCNC: 9.4 MG/DL (ref 5–40)
WBC # BLD AUTO: 6.97 10*3/MM3 (ref 4.5–10.7)

## 2018-03-18 LAB
BACTERIA UR CULT: NO GROWTH
BACTERIA UR CULT: NORMAL

## 2018-05-17 ENCOUNTER — TRANSCRIBE ORDERS (OUTPATIENT)
Dept: ADMINISTRATIVE | Facility: HOSPITAL | Age: 83
End: 2018-05-17

## 2018-05-17 DIAGNOSIS — J44.9 CHRONIC OBSTRUCTIVE PULMONARY DISEASE, UNSPECIFIED COPD TYPE (HCC): ICD-10-CM

## 2018-05-17 DIAGNOSIS — J84.10 PULMONARY FIBROSIS (HCC): Primary | ICD-10-CM

## 2018-05-17 DIAGNOSIS — J96.10 CHRONIC RESPIRATORY FAILURE, UNSPECIFIED WHETHER WITH HYPOXIA OR HYPERCAPNIA (HCC): ICD-10-CM

## 2018-05-21 ENCOUNTER — HOSPITAL ENCOUNTER (OUTPATIENT)
Dept: CT IMAGING | Facility: HOSPITAL | Age: 83
Discharge: HOME | End: 2018-05-21
Attending: INTERNAL MEDICINE | Admitting: INTERNAL MEDICINE

## 2018-05-21 DIAGNOSIS — J84.10 PULMONARY FIBROSIS (HCC): ICD-10-CM

## 2018-05-21 DIAGNOSIS — J96.10 CHRONIC RESPIRATORY FAILURE, UNSPECIFIED WHETHER WITH HYPOXIA OR HYPERCAPNIA (HCC): ICD-10-CM

## 2018-05-21 DIAGNOSIS — J44.9 CHRONIC OBSTRUCTIVE PULMONARY DISEASE, UNSPECIFIED COPD TYPE (HCC): ICD-10-CM

## 2018-05-21 PROCEDURE — 71250 CT THORAX DX C-: CPT

## 2018-05-21 RX ORDER — ATORVASTATIN CALCIUM 20 MG/1
TABLET, FILM COATED ORAL
Qty: 30 TABLET | Refills: 5 | Status: SHIPPED | OUTPATIENT
Start: 2018-05-21 | End: 2018-11-10 | Stop reason: SDUPTHER

## 2018-05-22 ENCOUNTER — HOSPITAL ENCOUNTER (OUTPATIENT)
Dept: CARDIOLOGY | Facility: HOSPITAL | Age: 83
Discharge: HOME | End: 2018-05-22
Admitting: INTERNAL MEDICINE

## 2018-05-22 ENCOUNTER — OFFICE VISIT (OUTPATIENT)
Dept: INTERNAL MEDICINE | Facility: CLINIC | Age: 83
End: 2018-05-22

## 2018-05-22 VITALS
SYSTOLIC BLOOD PRESSURE: 121 MMHG | HEART RATE: 61 BPM | DIASTOLIC BLOOD PRESSURE: 69 MMHG | OXYGEN SATURATION: 94 % | RESPIRATION RATE: 22 BRPM

## 2018-05-22 VITALS
SYSTOLIC BLOOD PRESSURE: 140 MMHG | BODY MASS INDEX: 28.45 KG/M2 | HEART RATE: 130 BPM | DIASTOLIC BLOOD PRESSURE: 60 MMHG | WEIGHT: 204 LBS | OXYGEN SATURATION: 92 %

## 2018-05-22 DIAGNOSIS — I48.92 ATRIAL FLUTTER WITH RAPID VENTRICULAR RESPONSE (HCC): Primary | ICD-10-CM

## 2018-05-22 DIAGNOSIS — J44.9 CHRONIC OBSTRUCTIVE PULMONARY DISEASE, UNSPECIFIED COPD TYPE (HCC): ICD-10-CM

## 2018-05-22 DIAGNOSIS — R94.31 ABNORMAL EKG: Primary | ICD-10-CM

## 2018-05-22 DIAGNOSIS — E03.8 SUBCLINICAL HYPOTHYROIDISM: ICD-10-CM

## 2018-05-22 DIAGNOSIS — I48.92 PAROXYSMAL ATRIAL FLUTTER (HCC): ICD-10-CM

## 2018-05-22 DIAGNOSIS — R00.0 TACHYCARDIA: ICD-10-CM

## 2018-05-22 DIAGNOSIS — I10 HYPERTENSION, UNSPECIFIED TYPE: ICD-10-CM

## 2018-05-22 DIAGNOSIS — I10 BENIGN ESSENTIAL HYPERTENSION: ICD-10-CM

## 2018-05-22 LAB
ALBUMIN SERPL-MCNC: 4 G/DL (ref 3.5–5.2)
ALBUMIN/GLOB SERPL: 1.1 G/DL
ALP SERPL-CCNC: 51 U/L (ref 39–117)
ALT SERPL W P-5'-P-CCNC: 35 U/L (ref 1–41)
ANION GAP SERPL CALCULATED.3IONS-SCNC: 10.5 MMOL/L
AST SERPL-CCNC: 34 U/L (ref 1–40)
BASOPHILS # BLD AUTO: 0.02 10*3/MM3 (ref 0–0.2)
BASOPHILS NFR BLD AUTO: 0.3 % (ref 0–1.5)
BILIRUB SERPL-MCNC: 1 MG/DL (ref 0.1–1.2)
BUN BLD-MCNC: 18 MG/DL (ref 8–23)
BUN/CREAT SERPL: 17.6 (ref 7–25)
CALCIUM SPEC-SCNC: 9.4 MG/DL (ref 8.6–10.5)
CHLORIDE SERPL-SCNC: 102 MMOL/L (ref 98–107)
CO2 SERPL-SCNC: 26.5 MMOL/L (ref 22–29)
CREAT BLD-MCNC: 1.02 MG/DL (ref 0.76–1.27)
D DIMER PPP FEU-MCNC: 0.63 MCGFEU/ML (ref 0–0.49)
DEPRECATED RDW RBC AUTO: 49.2 FL (ref 37–54)
EOSINOPHIL # BLD AUTO: 0.22 10*3/MM3 (ref 0–0.7)
EOSINOPHIL NFR BLD AUTO: 3.3 % (ref 0.3–6.2)
ERYTHROCYTE [DISTWIDTH] IN BLOOD BY AUTOMATED COUNT: 13.7 % (ref 11.5–14.5)
GFR SERPL CREATININE-BSD FRML MDRD: 70 ML/MIN/1.73
GLOBULIN UR ELPH-MCNC: 3.6 GM/DL
GLUCOSE BLD-MCNC: 110 MG/DL (ref 65–99)
HCT VFR BLD AUTO: 46.5 % (ref 40.4–52.2)
HGB BLD-MCNC: 15.9 G/DL (ref 13.7–17.6)
IMM GRANULOCYTES # BLD: 0.01 10*3/MM3 (ref 0–0.03)
IMM GRANULOCYTES NFR BLD: 0.2 % (ref 0–0.5)
LYMPHOCYTES # BLD AUTO: 1.74 10*3/MM3 (ref 0.9–4.8)
LYMPHOCYTES NFR BLD AUTO: 26.4 % (ref 19.6–45.3)
MCH RBC QN AUTO: 33.8 PG (ref 27–32.7)
MCHC RBC AUTO-ENTMCNC: 34.2 G/DL (ref 32.6–36.4)
MCV RBC AUTO: 98.9 FL (ref 79.8–96.2)
MONOCYTES # BLD AUTO: 1.04 10*3/MM3 (ref 0.2–1.2)
MONOCYTES NFR BLD AUTO: 15.8 % (ref 5–12)
NEUTROPHILS # BLD AUTO: 3.58 10*3/MM3 (ref 1.9–8.1)
NEUTROPHILS NFR BLD AUTO: 54.2 % (ref 42.7–76)
NT-PROBNP SERPL-MCNC: 362.3 PG/ML (ref 0–1800)
PLATELET # BLD AUTO: 277 10*3/MM3 (ref 140–500)
PMV BLD AUTO: 11.8 FL (ref 6–12)
POTASSIUM BLD-SCNC: 4 MMOL/L (ref 3.5–5.2)
PROT SERPL-MCNC: 7.6 G/DL (ref 6–8.5)
RBC # BLD AUTO: 4.7 10*6/MM3 (ref 4.6–6)
SODIUM BLD-SCNC: 139 MMOL/L (ref 136–145)
T-UPTAKE NFR SERPL: 1 TBI (ref 0.8–1.3)
T4 SERPL-MCNC: 4.99 MCG/DL (ref 4.5–11.7)
TROPONIN T SERPL-MCNC: <0.01 NG/ML (ref 0–0.03)
TSH SERPL DL<=0.05 MIU/L-ACNC: 2.88 MIU/ML (ref 0.27–4.2)
WBC NRBC COR # BLD: 6.6 10*3/MM3 (ref 4.5–10.7)

## 2018-05-22 PROCEDURE — 83880 ASSAY OF NATRIURETIC PEPTIDE: CPT | Performed by: INTERNAL MEDICINE

## 2018-05-22 PROCEDURE — 99214 OFFICE O/P EST MOD 30 MIN: CPT | Performed by: INTERNAL MEDICINE

## 2018-05-22 PROCEDURE — 84484 ASSAY OF TROPONIN QUANT: CPT | Performed by: INTERNAL MEDICINE

## 2018-05-22 PROCEDURE — 36415 COLL VENOUS BLD VENIPUNCTURE: CPT

## 2018-05-22 PROCEDURE — 84443 ASSAY THYROID STIM HORMONE: CPT | Performed by: INTERNAL MEDICINE

## 2018-05-22 PROCEDURE — 93005 ELECTROCARDIOGRAM TRACING: CPT | Performed by: INTERNAL MEDICINE

## 2018-05-22 PROCEDURE — 93010 ELECTROCARDIOGRAM REPORT: CPT | Performed by: INTERNAL MEDICINE

## 2018-05-22 PROCEDURE — 94760 N-INVAS EAR/PLS OXIMETRY 1: CPT

## 2018-05-22 PROCEDURE — 84479 ASSAY OF THYROID (T3 OR T4): CPT | Performed by: INTERNAL MEDICINE

## 2018-05-22 PROCEDURE — 80053 COMPREHEN METABOLIC PANEL: CPT | Performed by: INTERNAL MEDICINE

## 2018-05-22 PROCEDURE — 85025 COMPLETE CBC W/AUTO DIFF WBC: CPT | Performed by: INTERNAL MEDICINE

## 2018-05-22 PROCEDURE — 84436 ASSAY OF TOTAL THYROXINE: CPT | Performed by: INTERNAL MEDICINE

## 2018-05-22 PROCEDURE — 85379 FIBRIN DEGRADATION QUANT: CPT | Performed by: INTERNAL MEDICINE

## 2018-05-22 PROCEDURE — 99215 OFFICE O/P EST HI 40 MIN: CPT | Performed by: INTERNAL MEDICINE

## 2018-05-22 PROCEDURE — 93000 ELECTROCARDIOGRAM COMPLETE: CPT | Performed by: INTERNAL MEDICINE

## 2018-05-22 RX ORDER — SODIUM CHLORIDE 0.9 % (FLUSH) 0.9 %
10 SYRINGE (ML) INJECTION AS NEEDED
Status: DISCONTINUED | OUTPATIENT
Start: 2018-05-22 | End: 2020-10-29

## 2018-05-22 RX ORDER — NITROGLYCERIN 0.4 MG/1
0.4 TABLET SUBLINGUAL
Status: DISCONTINUED | OUTPATIENT
Start: 2018-05-22 | End: 2020-10-29

## 2018-05-22 RX ORDER — DILTIAZEM HYDROCHLORIDE 120 MG/1
120 CAPSULE, COATED, EXTENDED RELEASE ORAL DAILY
Qty: 30 CAPSULE | Refills: 11 | Status: SHIPPED | OUTPATIENT
Start: 2018-05-22 | End: 2019-06-10 | Stop reason: SDUPTHER

## 2018-05-22 NOTE — PROGRESS NOTES
Subjective:     Encounter Date:05/22/2018      Patient ID: Cuauhtemoc Buck is a 83 y.o. male.    Chief Complaint:  History of Present Illness    Dear Dr. Jha,    I had the pleasure of seeing this patient in the [facility] today.    He typically follows with Dr. Trejo in our office.  This morning, he was taking the trash out, and he noted that he would walk about 20 feet and have to stop because he was getting so short of breath.  He does have severe underlying COPD, and is on chronic oxygen therapy, but this was very unusual for him.  He does do pulmonary rehabilitation on a regular basis.  He keeps of finger oximeter with him, and when he checked it it told him that his heart rate was about 150.  He came in to your office for routine visit and was noted to be in atrial flutter with 2-1 block and a heart rate of approximately 130.  Your office contact us to bring him up.  He states that while he was sitting in your exam room and suddenly his heart rate dropped back to about 70.  He couldn't really notice any difference.  He never had any sensation tachycardia or palpitation.  He did not have any issues with dizziness or lightheadedness and did not experience any chest pain or chest discomfort.    He has no history of thyroid disease; he had a TSH obtained in March of this year that was normal.    He was last seen by Dr. Trejo back in February 2017.    Patient has no prior history of atrial flutter or other tachycardia.    Currently he feels great.  [birth year], and he wants to go home soon.    The following portions of the patient's history were reviewed and updated as appropriate: allergies, current medications, past family history, past medical history, past social history, past surgical history and problem list.    Past Medical History:   Diagnosis Date   • Abnormal CT scan    • Acute back pain    • Acute sinusitis    • Allergic conjunctivitis    • Allergic rhinitis    • Benign essential  hypertension    • Bronchitis    • COPD (chronic obstructive pulmonary disease)    • TRINH (dyspnea on exertion)    • Dyspnea    • Emphysema lung    • Fatigue    • Hyperlipidemia    • Hypertension    • EDILMA (obstructive sleep apnea)    • Pain in joint of left shoulder    • Palpitations    • Polyp, sigmoid colon    • Sleep apnea    • SOBOE (shortness of breath on exertion)    • Subclinical hypothyroidism    • Tachycardia     sudden   • Trigger finger        Past Surgical History:   Procedure Laterality Date   • COLONOSCOPY N/A 10/28/2016    Procedure: COLONOSCOPY INTO CECUM WITH POLYPECTOMY X 2;  Surgeon: Carli Khanna MD;  Location: [facility] ENDOSCOPY;  Service:    • CYST REMOVAL  1960       Social History     Social History   • Marital status:      Spouse name: N/A   • Number of children: N/A   • Years of education: N/A     Occupational History   • Not on file.     Social History Main Topics   • Smoking status: Former Smoker     Packs/day: 1.50     Years: 35.00     Types: Cigarettes     Quit date: 2005   • Smokeless tobacco: Not on file      Comment: caffeine use   • Alcohol use Yes      Comment: 5-7 beer/liquor drinks a week   • Drug use: No   • Sexual activity: Defer     Other Topics Concern   • Not on file     Social History Narrative   • No narrative on file       Review of Systems   Constitution: Negative for chills, decreased appetite, fever and night sweats.   HENT: Negative for ear discharge, ear pain, hearing loss, nosebleeds and sore throat.    Eyes: Negative for blurred vision, double vision and pain.   Cardiovascular: Negative for cyanosis.   Respiratory: Negative for hemoptysis and sputum production.    Endocrine: Negative for cold intolerance and heat intolerance.   Hematologic/Lymphatic: Negative for adenopathy.   Skin: Negative for dry skin, itching, nail changes, rash and suspicious lesions.   Musculoskeletal: Negative for arthritis, gout, muscle cramps, muscle weakness, myalgias and neck  pain.   Gastrointestinal: Negative for anorexia, bowel incontinence, constipation, diarrhea, dysphagia, hematemesis and jaundice.   Genitourinary: Negative for bladder incontinence, dysuria, flank pain, frequency, hematuria and nocturia.   Neurological: Negative for focal weakness, numbness, paresthesias and seizures.   Psychiatric/Behavioral: Negative for altered mental status, hallucinations, hypervigilance, suicidal ideas and thoughts of violence.   Allergic/Immunologic: Negative for persistent infections.         ECG 12 Lead  Date/Time: 5/22/2018 4:05 PM  Performed by: EJ BAILON III  Authorized by: EJ BAILON III   Comparison: compared with previous ECG   Similar to previous ECG  Rhythm: sinus rhythm  Ectopy: PVCs  Rate: normal  Conduction: conduction normal  ST Segments: ST segments normal  T Waves: T waves normal  QRS axis: normal  Other: no other findings  Clinical impression: normal ECG               Objective:     Vitals:    05/22/18 1515 05/22/18 1601   BP: 110/69 121/69   BP Location: Left arm Right arm   Patient Position: Sitting    Pulse: 61    Resp: 22    SpO2: 94%          Physical Exam   Constitutional: He is oriented to person, place, and time. He appears well-developed and well-nourished. No distress.   HENT:   Head: Normocephalic and atraumatic.   Nose: Nose normal.   Mouth/Throat: Oropharynx is clear and moist.   Eyes: Conjunctivae and EOM are normal. Pupils are equal, round, and reactive to light. Right eye exhibits no discharge. Left eye exhibits no discharge.   Neck: Normal range of motion. Neck supple. No tracheal deviation present. No thyromegaly present.   Cardiovascular: Normal rate, regular rhythm, S1 normal, S2 normal, normal heart sounds and normal pulses.  Exam reveals no S3.    Pulmonary/Chest: Effort normal and breath sounds normal. No stridor. No respiratory distress. He exhibits no tenderness.   Abdominal: Soft. Bowel sounds are normal. He exhibits no distension and no  mass. There is no tenderness. There is no rebound and no guarding.   Musculoskeletal: Normal range of motion. He exhibits no tenderness or deformity.   Lymphadenopathy:     He has no cervical adenopathy.   Neurological: He is alert and oriented to person, place, and time. He has normal reflexes.   Skin: Skin is warm and dry. No rash noted. He is not diaphoretic. No erythema.   Psychiatric: He has a normal mood and affect. Thought content normal.       Lab Review:             Performed        Assessment:          Diagnosis Plan   1. Abnormal EKG  Cardiac Monitoring    Vital Signs - Once    Vital Signs - As Needed    Pulse Oximetry    Oxygen Therapy- Nasal Cannula; Titrate for SPO2: 92%, equal to or greater than    Insert Peripheral IV    sodium chloride 0.9 % flush 10 mL    nitroglycerin (NITROSTAT) SL tablet 0.4 mg    NPO Diet    Bathroom Privileges With Assistance    CBC & Differential    Comprehensive Metabolic Panel    Troponin T    D-dimer    proBNP    Cardiac Monitoring    Vital Signs - Once    Oxygen Therapy- Nasal Cannula; Titrate for SPO2: 92%, equal to or greater than    Insert Peripheral IV    NPO Diet    Bathroom Privileges With Assistance    Troponin T    Troponin T    D-dimer    D-dimer    proBNP    proBNP    CBC Auto Differential   2. Paroxysmal atrial flutter  Thyroid Panel With TSH    Thyroid Panel With TSH    Thyroid Panel With TSH   3. Chronic obstructive pulmonary disease, unspecified COPD type     4. Benign essential hypertension     5. Tachycardia  Thyroid Panel With TSH    Thyroid Panel With TSH    Thyroid Panel With TSH          Plan:       1. Atrial Fibrillation and Atrial Flutter  Assessment  • The patient has atrial flutter-initial episode  • The patient's CHADS2-VASc score is 3  • A [id number] score of 2 or more is considered a high risk for a thromboembolic event    Plan  • Add diltiazem for rhythm control  • This patient presents for initial presentation for an episode of paroxysmal  atrial flutter that occurred earlier this afternoon.  Currently is back in sinus rhythm and is asymptomatic.  He really didn't have any symptoms at the time that he was in atrial flutter.  Lab work has been obtained, and it was generally unremarkable.  The d-dimer was slightly elevated at 0.6, but he really does not have any symptoms or history to suggest pulmonary embolism or thrombus, so I would not receive with any diagnostic testing on that.  Thyroid function tests are still pending and should return by tomorrow.  It is too late to obtain an echocardiogram this evening, so we will arrange for him to come to the office tomorrow for an echocardiogram and we will also place a Holter monitor. So far his CHADS-Vasc score is 3; will await the results of the echo prior to initiating OAC.  After the echo and Holter monitor are available the patient will be following up with Dr. Trejo.  We will initiate diltiazem  mg once daily-I have sent in a prescription for this.    2.  Severe COPD on chronic oxygen therapy  3.  Hypertension-continue current medical regimen, blood pressure is under reasonable control    Thank you very much for allowing us to participate in the care of this pleasant patient.  Please don't hesitate to call if I can be of assistance in any way.      Current Outpatient Prescriptions:   •  albuterol (PROVENTIL HFA) 108 (90 BASE) MCG/ACT inhaler, Proventil  (90 Base) MCG/ACT Inhalation Aerosol Solution; Patient Sig: Proventil  (90 Base) MCG/ACT Inhalation Aerosol Solution INHALE TWO PUFFS BY MOUTH EVERY 6 HOURS AS NEEDED FOR SHORTNESS OF AIR; 60; 6; 27-Apr-2015; Active, Disp: , Rfl:   •  atorvastatin (LIPITOR) 20 MG tablet, TAKE ONE TABLET BY MOUTH DAILY, Disp: 30 tablet, Rfl: 5  •  Coenzyme Q10 (CO Q-10) 100 MG capsule, Take  by mouth., Disp: , Rfl:   •  fluticasone-salmeterol (ADVAIR DISKUS) 250-50 MCG/DOSE DISKUS, Inhale 2 (Two) Times a Day., Disp: , Rfl:   •  fluticasone-salmeterol  (ADVAIR DISKUS) 500-50 MCG/DOSE DISKUS, Inhale 1 puff 2 (Two) Times a Day., Disp: 60 each, Rfl: 3  •  losartan-hydrochlorothiazide (HYZAAR) 100-12.5 MG per tablet, TAKE ONE TABLET BY MOUTH DAILY, Disp: 90 tablet, Rfl: 1  •  SPIRIVA HANDIHALER 18 MCG per inhalation capsule, INHALE ONE CAPSULE BY MOUTH DAILY, Disp: 30 capsule, Rfl: 9  •  vitamin B-12 (CYANOCOBALAMIN) 100 MCG tablet, Take  by mouth., Disp: , Rfl:     Current Facility-Administered Medications:   •  levalbuterol (XOPENEX) nebulizer solution 0.63 mg, 0.63 mg, Nebulization, Q6H PRN, Padmini Jha MD, 0.63 mg at 01/29/18 1333  •  nitroglycerin (NITROSTAT) SL tablet 0.4 mg, 0.4 mg, Sublingual, Q5 Min PRN, Xavi East III, MD  •  sodium chloride 0.9 % flush 10 mL, 10 mL, Intravenous, PRN, Xavi East III, MD

## 2018-05-22 NOTE — PROGRESS NOTES
"Chief Complaint   Patient presents with   • Hypertension   • Hyperlipidemia   • COPD   • Rapid Heart Rate       History of Present Illness   Cuauhtemoc Buck is a 83 y.o. male presents for routine follow up evaluation. Patient has copd and is o2 dependent. He has stable exercise tolerance. He watches his oxygenation with pulse oxymetry and noted that pulse ranged  this morning. He noticed that garbage seemed heavier and he became short winded when trying to move it. Patient reports \"i think I have that afib\". Cardiac eval earlier this year and studies were not indicated at that time. Patient is currently not on any medications for blood thinning nor rate control. Lipids are at goal. pulm function has been stable and he previously could work out at fitness facility for at least an hour several times weekly.       The following portions of the patient's history were reviewed and updated as appropriate: allergies, current medications, past family history, past medical history, past social history, past surgical history and problem list.  Current Outpatient Prescriptions on File Prior to Visit   Medication Sig Dispense Refill   • albuterol (PROVENTIL HFA) 108 (90 BASE) MCG/ACT inhaler Proventil  (90 Base) MCG/ACT Inhalation Aerosol Solution; Patient Sig: Proventil  (90 Base) MCG/ACT Inhalation Aerosol Solution INHALE TWO PUFFS BY MOUTH EVERY 6 HOURS AS NEEDED FOR SHORTNESS OF AIR; 60; 6; 27-Apr-2015; Active     • atorvastatin (LIPITOR) 20 MG tablet TAKE ONE TABLET BY MOUTH DAILY 30 tablet 5   • Coenzyme Q10 (CO Q-10) 100 MG capsule Take  by mouth.     • fluticasone-salmeterol (ADVAIR DISKUS) 250-50 MCG/DOSE DISKUS Inhale 2 (Two) Times a Day.     • fluticasone-salmeterol (ADVAIR DISKUS) 500-50 MCG/DOSE DISKUS Inhale 1 puff 2 (Two) Times a Day. 60 each 3   • losartan-hydrochlorothiazide (HYZAAR) 100-12.5 MG per tablet TAKE ONE TABLET BY MOUTH DAILY 90 tablet 1   • SPIRIVA HANDIHALER 18 MCG per inhalation " capsule INHALE ONE CAPSULE BY MOUTH DAILY 30 capsule 9   • vitamin B-12 (CYANOCOBALAMIN) 100 MCG tablet Take  by mouth.     • [DISCONTINUED] MethylPREDNISolone (MEDROL, BRENDA,) 4 MG tablet Take as directed on package instructions. 21 each 0   • [DISCONTINUED] nitrofurantoin, macrocrystal-monohydrate, (MACROBID) 100 MG capsule Take 1 capsule by mouth Every 12 (Twelve) Hours. 14 capsule 0   • [DISCONTINUED] promethazine-dextromethorphan (PROMETHAZINE-DM) 6.25-15 MG/5ML syrup Take 5 mL by mouth 4 (Four) Times a Day As Needed for Cough. 150 mL 0     Current Facility-Administered Medications on File Prior to Visit   Medication Dose Route Frequency Provider Last Rate Last Dose   • levalbuterol (XOPENEX) nebulizer solution 0.63 mg  0.63 mg Nebulization Q6H PRN Padmini Jha MD   0.63 mg at 01/29/18 1333     Review of Systems   Constitutional: Positive for fatigue.   HENT: Negative.    Eyes: Negative.    Respiratory: Positive for shortness of breath.    Cardiovascular: Positive for leg swelling. Negative for chest pain and palpitations.   Gastrointestinal: Negative.    Endocrine: Negative.    Genitourinary: Negative.    Musculoskeletal: Negative.    Skin: Negative.    Allergic/Immunologic: Negative.    Neurological: Negative for dizziness.   Hematological: Negative.    Psychiatric/Behavioral: Negative.        Objective   Physical Exam   Constitutional: He is oriented to person, place, and time. He appears well-developed and well-nourished.   HENT:   Head: Normocephalic and atraumatic.   Right Ear: External ear normal.   Left Ear: External ear normal.   Nose: Nose normal.   Mouth/Throat: Oropharynx is clear and moist.   Eyes: Conjunctivae and EOM are normal. Pupils are equal, round, and reactive to light.   Neck: Normal range of motion. Neck supple.   Cardiovascular: Normal heart sounds.    Irregularly irregular   Pulmonary/Chest: Effort normal and breath sounds normal. No respiratory distress.   Abdominal: Soft. Bowel  sounds are normal.   Musculoskeletal: Normal range of motion.   Neurological: He is alert and oriented to person, place, and time.   Skin: Skin is warm and dry.   Psychiatric: He has a normal mood and affect. His behavior is normal. Judgment and thought content normal.   Nursing note and vitals reviewed.     EKG for tachycardia and dyspnea. Atrial flutter w/ rvr. 129 bpm. nonspec changes.     /60   Pulse (!) 130   Wt 92.5 kg (204 lb)   SpO2 92%   BMI 28.45 kg/m²     Assessment/Plan   Diagnoses and all orders for this visit:    Atrial flutter with rapid ventricular response  -     ECG 12 Lead    Chronic obstructive pulmonary disease, unspecified COPD type    Subclinical hypothyroidism    Hypertension, unspecified type      Acute atrial flutter. May have remote diagnosis of atrial fibrillation but currently not on med for anticoagulation nor rate control. Will send to chest pain center STAT given that heart rate between 130-160 today and he has been symptomatic from this. Will likely need ccb as bb contraindicated with his current lung disease. Will also need to start anticoagulant. Continue current meds for copd. Continue synthroid replacement therapy. Monitor bp. Avoid caffeine. Maintain hydration.   Close follow up.

## 2018-05-22 NOTE — PATIENT INSTRUCTIONS
Atrial Flutter  Atrial flutter is a type of abnormal heart rhythm (arrhythmia). In atrial flutter, the heartbeat is fast but regular. There are two types of atrial flutter:  · Paroxysmal atrial flutter. This type starts suddenly. It usually stops on its own soon after it starts.  · Permanent atrial flutter. This type does not go away.  What are the causes?  This condition may be caused by:  · A heart condition or problem, such as:  ¨ A heart attack.  ¨ Heart failure.  ¨ A heart valve problem.  · A lung problem, such as:  ¨ A blood clot in the lungs (pulmonary embolism, or PE).  ¨ Chronic obstructive pulmonary disease.  · Poorly controlled high blood pressure (hypertension).  · Hyperthyroidism.  · Caffeine.  · Some decongestant cold medicines.  · Low levels of minerals called electrolytes in the blood.  · Cocaine.  What increases the risk?  This condition is more likely to develop in:  · Elderly adults.  · Men.  What are the signs or symptoms?  Symptoms of this condition include:  · A feeling that your heart is pounding or racing (palpitations).  · Shortness of breath.  · Chest pain.  · Feeling light-headed.  · Dizziness.  · Fainting.  How is this diagnosed?  This condition may be diagnosed with tests, including:  · An electrocardiogram (ECG). This is a painless test that records electrical signals in the heart.  · Holter monitoring. For this test, you wear a device that records your heartbeat for 1-2 days.  · Cardiac event monitoring. For this test, you wear a device that records your heartbeat for up to 30 days.  · An echocardiogram. This is a painless test that uses sound waves to make a picture of your heart.  · Stress test. This test records your heartbeat while you exercise.  · Blood tests.  How is this treated?  This condition may be treated with:  · Treatment of any underlying conditions.  · Medicine to make your heart beat more slowly.  · Medicine to keep the condition from coming back.  · A procedure to  keep the condition under control. Some procedures to do this include:  ¨ Cardioversion. During this procedure, medicines or an electrical shock are given to make the heart beat normally.  ¨ Ablation. During this procedure, the heart tissue that is causing the problem is destroyed. This procedure may be done if atrial flutter lasts a long time or happens often.  Follow these instructions at home:  · Take over-the-counter and prescription medicines only as told by your health care provider.  · Do not take any new medicines without talking to your health care provider.  · Do not use tobacco products, including cigarettes, chewing tobacco, or e-cigarettes. If you need help quitting, ask your health care provider.  · Limit alcohol intake to no more than 1 drink per day for nonpregnant women and 2 drinks per day for men. One drink equals 12 oz of beer, 5 oz of wine, or 1½ oz of hard liquor.  · Try to reduce any stress. Stress can make your symptoms worse.  Contact a health care provider if:  · Your symptoms get worse.  Get help right away if:  · You are dizzy.  · You feel like fainting or you faint.  · You have shortness of breath.  · You feel pain or pressure in your chest.  · You suddenly feel nauseous or you suddenly vomit.  · There is a sudden change in your ability to speak, eat, or move.  · You are sweating a lot for no reason.  This information is not intended to replace advice given to you by your health care provider. Make sure you discuss any questions you have with your health care provider.  Document Released: 05/06/2010 Document Revised: 04/26/2017 Document Reviewed: 07/01/2016  [employer] Interactive Patient Education © 2017 [employer]

## 2018-05-31 ENCOUNTER — HOSPITAL ENCOUNTER (OUTPATIENT)
Dept: CARDIOLOGY | Facility: HOSPITAL | Age: 83
Discharge: HOME | End: 2018-05-31
Attending: INTERNAL MEDICINE | Admitting: INTERNAL MEDICINE

## 2018-05-31 VITALS
HEART RATE: 71 BPM | DIASTOLIC BLOOD PRESSURE: 77 MMHG | SYSTOLIC BLOOD PRESSURE: 145 MMHG | BODY MASS INDEX: 28.56 KG/M2 | HEIGHT: 71 IN | WEIGHT: 204 LBS

## 2018-05-31 DIAGNOSIS — R94.31 ABNORMAL EKG: ICD-10-CM

## 2018-05-31 DIAGNOSIS — I48.92 PAROXYSMAL ATRIAL FLUTTER (HCC): ICD-10-CM

## 2018-05-31 DIAGNOSIS — R00.0 TACHYCARDIA: ICD-10-CM

## 2018-05-31 LAB
ASCENDING AORTA: 3 CM
BH CV ECHO MEAS - ACS: 2 CM
BH CV ECHO MEAS - AO MAX PG (FULL): 0.08 MMHG
BH CV ECHO MEAS - AO MAX PG: 4.7 MMHG
BH CV ECHO MEAS - AO MEAN PG (FULL): 0.17 MMHG
BH CV ECHO MEAS - AO MEAN PG: 2.5 MMHG
BH CV ECHO MEAS - AO ROOT AREA (BSA CORRECTED): 1.4
BH CV ECHO MEAS - AO ROOT AREA: 7.3 CM^2
BH CV ECHO MEAS - AO ROOT DIAM: 3.1 CM
BH CV ECHO MEAS - AO V2 MAX: 108.6 CM/SEC
BH CV ECHO MEAS - AO V2 MEAN: 75.4 CM/SEC
BH CV ECHO MEAS - AO V2 VTI: 26.3 CM
BH CV ECHO MEAS - AVA(I,A): 2.9 CM^2
BH CV ECHO MEAS - AVA(I,D): 2.9 CM^2
BH CV ECHO MEAS - AVA(V,A): 3.2 CM^2
BH CV ECHO MEAS - AVA(V,D): 3.2 CM^2
BH CV ECHO MEAS - BSA(HAYCOCK): 2.2 M^2
BH CV ECHO MEAS - BSA: 2.1 M^2
BH CV ECHO MEAS - BZI_BMI: 28.5 KILOGRAMS/M^2
BH CV ECHO MEAS - BZI_METRIC_HEIGHT: 180.3 CM
BH CV ECHO MEAS - BZI_METRIC_WEIGHT: 92.5 KG
BH CV ECHO MEAS - CONTRAST EF (2CH): 56.3 ML/M^2
BH CV ECHO MEAS - CONTRAST EF 4CH: 56.4 ML/M^2
BH CV ECHO MEAS - EDV(MOD-SP2): 87 ML
BH CV ECHO MEAS - EDV(MOD-SP4): 78 ML
BH CV ECHO MEAS - EDV(TEICH): 100.1 ML
BH CV ECHO MEAS - EF(CUBED): 65.3 %
BH CV ECHO MEAS - EF(MOD-BP): 57 %
BH CV ECHO MEAS - EF(MOD-SP2): 56.3 %
BH CV ECHO MEAS - EF(MOD-SP4): 56.4 %
BH CV ECHO MEAS - EF(TEICH): 56.8 %
BH CV ECHO MEAS - ESV(MOD-SP2): 38 ML
BH CV ECHO MEAS - ESV(MOD-SP4): 34 ML
BH CV ECHO MEAS - ESV(TEICH): 43.3 ML
BH CV ECHO MEAS - FS: 29.7 %
BH CV ECHO MEAS - IVS/LVPW: 1
BH CV ECHO MEAS - IVSD: 1.2 CM
BH CV ECHO MEAS - LAT PEAK E' VEL: 9 CM/SEC
BH CV ECHO MEAS - LV DIASTOLIC VOL/BSA (35-75): 36.7 ML/M^2
BH CV ECHO MEAS - LV MASS(C)D: 191.6 GRAMS
BH CV ECHO MEAS - LV MASS(C)DI: 90.1 GRAMS/M^2
BH CV ECHO MEAS - LV MAX PG: 4.6 MMHG
BH CV ECHO MEAS - LV MEAN PG: 2.3 MMHG
BH CV ECHO MEAS - LV SYSTOLIC VOL/BSA (12-30): 16 ML/M^2
BH CV ECHO MEAS - LV V1 MAX: 107.7 CM/SEC
BH CV ECHO MEAS - LV V1 MEAN: 72.8 CM/SEC
BH CV ECHO MEAS - LV V1 VTI: 23.6 CM
BH CV ECHO MEAS - LVIDD: 4.7 CM
BH CV ECHO MEAS - LVIDS: 3.3 CM
BH CV ECHO MEAS - LVLD AP2: 7.2 CM
BH CV ECHO MEAS - LVLD AP4: 7.2 CM
BH CV ECHO MEAS - LVLS AP2: 6.4 CM
BH CV ECHO MEAS - LVLS AP4: 6.1 CM
BH CV ECHO MEAS - LVOT AREA (M): 3.1 CM^2
BH CV ECHO MEAS - LVOT AREA: 3.2 CM^2
BH CV ECHO MEAS - LVOT DIAM: 2 CM
BH CV ECHO MEAS - LVPWD: 1.1 CM
BH CV ECHO MEAS - MED PEAK E' VEL: 10 CM/SEC
BH CV ECHO MEAS - MR MAX PG: 28.6 MMHG
BH CV ECHO MEAS - MR MAX VEL: 267.4 CM/SEC
BH CV ECHO MEAS - MV A DUR: 0.13 SEC
BH CV ECHO MEAS - MV A MAX VEL: 92.6 CM/SEC
BH CV ECHO MEAS - MV DEC SLOPE: 609.7 CM/SEC^2
BH CV ECHO MEAS - MV DEC TIME: 0.16 SEC
BH CV ECHO MEAS - MV E MAX VEL: 100 CM/SEC
BH CV ECHO MEAS - MV E/A: 1.1
BH CV ECHO MEAS - MV MAX PG: 5.7 MMHG
BH CV ECHO MEAS - MV MEAN PG: 2.6 MMHG
BH CV ECHO MEAS - MV P1/2T MAX VEL: 101.4 CM/SEC
BH CV ECHO MEAS - MV P1/2T: 48.7 MSEC
BH CV ECHO MEAS - MV V2 MAX: 119.4 CM/SEC
BH CV ECHO MEAS - MV V2 MEAN: 75 CM/SEC
BH CV ECHO MEAS - MV V2 VTI: 40.1 CM
BH CV ECHO MEAS - MVA P1/2T LCG: 2.2 CM^2
BH CV ECHO MEAS - MVA(P1/2T): 4.5 CM^2
BH CV ECHO MEAS - MVA(VTI): 1.9 CM^2
BH CV ECHO MEAS - PA ACC TIME: 0.15 SEC
BH CV ECHO MEAS - PA MAX PG (FULL): 2 MMHG
BH CV ECHO MEAS - PA MAX PG: 3.6 MMHG
BH CV ECHO MEAS - PA PR(ACCEL): 10.9 MMHG
BH CV ECHO MEAS - PA V2 MAX: 95 CM/SEC
BH CV ECHO MEAS - PULM A REVS DUR: 0.11 SEC
BH CV ECHO MEAS - PULM A REVS VEL: 19.8 CM/SEC
BH CV ECHO MEAS - PULM DIAS VEL: 30.7 CM/SEC
BH CV ECHO MEAS - PULM S/D: 1.1
BH CV ECHO MEAS - PULM SYS VEL: 34.7 CM/SEC
BH CV ECHO MEAS - PVA(V,A): 1.8 CM^2
BH CV ECHO MEAS - PVA(V,D): 1.8 CM^2
BH CV ECHO MEAS - QP/QS: 0.59
BH CV ECHO MEAS - RAP SYSTOLE: 15 MMHG
BH CV ECHO MEAS - RV MAX PG: 1.6 MMHG
BH CV ECHO MEAS - RV MEAN PG: 0.91 MMHG
BH CV ECHO MEAS - RV V1 MAX: 63.1 CM/SEC
BH CV ECHO MEAS - RV V1 MEAN: 45.2 CM/SEC
BH CV ECHO MEAS - RV V1 VTI: 16.7 CM
BH CV ECHO MEAS - RVOT AREA: 2.7 CM^2
BH CV ECHO MEAS - RVOT DIAM: 1.8 CM
BH CV ECHO MEAS - RVSP: 37.3 MMHG
BH CV ECHO MEAS - SI(AO): 90.6 ML/M^2
BH CV ECHO MEAS - SI(CUBED): 31 ML/M^2
BH CV ECHO MEAS - SI(LVOT): 35.4 ML/M^2
BH CV ECHO MEAS - SI(MOD-SP2): 23 ML/M^2
BH CV ECHO MEAS - SI(MOD-SP4): 20.7 ML/M^2
BH CV ECHO MEAS - SI(TEICH): 26.7 ML/M^2
BH CV ECHO MEAS - SUP REN AO DIAM: 2.1 CM
BH CV ECHO MEAS - SV(AO): 192.6 ML
BH CV ECHO MEAS - SV(CUBED): 65.8 ML
BH CV ECHO MEAS - SV(LVOT): 75.2 ML
BH CV ECHO MEAS - SV(MOD-SP2): 49 ML
BH CV ECHO MEAS - SV(MOD-SP4): 44 ML
BH CV ECHO MEAS - SV(RVOT): 44.6 ML
BH CV ECHO MEAS - SV(TEICH): 56.9 ML
BH CV ECHO MEAS - TAPSE (>1.6): 3.1 CM2
BH CV ECHO MEAS - TR MAX VEL: 236.2 CM/SEC
BH CV ECHO MEASUREMENTS AVERAGE E/E' RATIO: 10.53
BH CV XLRA - RV BASE: 3 CM
BH CV XLRA - TDI S': 15 CM/SEC
LEFT ATRIUM VOLUME INDEX: 24 ML/M2
LV EF 2D ECHO EST: 57 %
SINUS: 3 CM
STJ: 2.6 CM

## 2018-05-31 PROCEDURE — 93306 TTE W/DOPPLER COMPLETE: CPT

## 2018-05-31 PROCEDURE — 93306 TTE W/DOPPLER COMPLETE: CPT | Performed by: INTERNAL MEDICINE

## 2018-06-01 ENCOUNTER — TELEPHONE (OUTPATIENT)
Dept: CARDIOLOGY | Facility: CLINIC | Age: 83
End: 2018-06-01

## 2018-06-01 RX ORDER — LOSARTAN POTASSIUM AND HYDROCHLOROTHIAZIDE 12.5; 1 MG/1; MG/1
TABLET ORAL
Qty: 90 TABLET | Refills: 0 | Status: SHIPPED | OUTPATIENT
Start: 2018-06-01 | End: 2018-06-09 | Stop reason: SDUPTHER

## 2018-06-11 RX ORDER — LOSARTAN POTASSIUM AND HYDROCHLOROTHIAZIDE 12.5; 1 MG/1; MG/1
TABLET ORAL
Qty: 90 TABLET | Refills: 0 | Status: SHIPPED | OUTPATIENT
Start: 2018-06-11 | End: 2019-06-04 | Stop reason: ALTCHOICE

## 2018-06-18 ENCOUNTER — TELEPHONE (OUTPATIENT)
Dept: CARDIOLOGY | Facility: HOSPITAL | Age: 83
End: 2018-06-18

## 2018-09-04 RX ORDER — LOSARTAN POTASSIUM AND HYDROCHLOROTHIAZIDE 12.5; 1 MG/1; MG/1
TABLET ORAL
Qty: 90 TABLET | Refills: 0 | Status: SHIPPED | OUTPATIENT
Start: 2018-09-04 | End: 2018-11-27 | Stop reason: SDUPTHER

## 2018-09-05 ENCOUNTER — TRANSCRIBE ORDERS (OUTPATIENT)
Dept: ADMINISTRATIVE | Facility: HOSPITAL | Age: 83
End: 2018-09-05

## 2018-09-05 DIAGNOSIS — Z13.6 ENCOUNTER FOR SCREENING FOR VASCULAR DISEASE: Primary | ICD-10-CM

## 2018-09-11 ENCOUNTER — HOSPITAL ENCOUNTER (OUTPATIENT)
Dept: CARDIOLOGY | Facility: HOSPITAL | Age: 83
Discharge: HOME | End: 2018-09-11
Admitting: INTERNAL MEDICINE

## 2018-09-11 VITALS
WEIGHT: 203 LBS | BODY MASS INDEX: 30.07 KG/M2 | SYSTOLIC BLOOD PRESSURE: 134 MMHG | HEART RATE: 68 BPM | HEIGHT: 69 IN | DIASTOLIC BLOOD PRESSURE: 68 MMHG

## 2018-09-11 DIAGNOSIS — Z13.6 ENCOUNTER FOR SCREENING FOR VASCULAR DISEASE: ICD-10-CM

## 2018-09-11 LAB
BH CV ECHO MEAS - DIST AO DIAM: 1.62 CM
BH CV VAS BP LEFT ARM: NORMAL MMHG
BH CV VAS BP RIGHT ARM: NORMAL MMHG
BH CV XLRA MEAS - MID AO DIAM: 1.82 CM
BH CV XLRA MEAS - PAD LEFT ABI DP: 1.04
BH CV XLRA MEAS - PAD LEFT ABI PT: 1.07
BH CV XLRA MEAS - PAD LEFT ARM: 134 MMHG
BH CV XLRA MEAS - PAD LEFT LEG DP: 140 MMHG
BH CV XLRA MEAS - PAD LEFT LEG PT: 144 MMHG
BH CV XLRA MEAS - PAD RIGHT ABI DP: 1.02
BH CV XLRA MEAS - PAD RIGHT ABI PT: 1.04
BH CV XLRA MEAS - PAD RIGHT ARM: 128 MMHG
BH CV XLRA MEAS - PAD RIGHT LEG DP: 138 MMHG
BH CV XLRA MEAS - PAD RIGHT LEG PT: 140 MMHG
BH CV XLRA MEAS - PROX AO DIAM: 2.33 CM
BH CV XLRA MEAS LEFT ICA/CCA RATIO: 0.87
BH CV XLRA MEAS LEFT MID CCA PSV: NORMAL CM/SEC
BH CV XLRA MEAS LEFT MID ICA PSV: NORMAL CM/SEC
BH CV XLRA MEAS LEFT PROX ECA PSV: NORMAL CM/SEC
BH CV XLRA MEAS RIGHT ICA/CCA RATIO: 0.95
BH CV XLRA MEAS RIGHT MID CCA PSV: NORMAL CM/SEC
BH CV XLRA MEAS RIGHT MID ICA PSV: NORMAL CM/SEC
BH CV XLRA MEAS RIGHT PROX ECA PSV: NORMAL CM/SEC

## 2018-09-11 PROCEDURE — 93799 UNLISTED CV SVC/PROCEDURE: CPT

## 2018-11-12 RX ORDER — ATORVASTATIN CALCIUM 20 MG/1
TABLET, FILM COATED ORAL
Qty: 30 TABLET | Refills: 4 | Status: SHIPPED | OUTPATIENT
Start: 2018-11-12 | End: 2019-06-06 | Stop reason: SDUPTHER

## 2018-11-19 RX ORDER — TIOTROPIUM BROMIDE 18 UG/1
CAPSULE ORAL; RESPIRATORY (INHALATION)
Qty: 30 CAPSULE | Refills: 8 | Status: SHIPPED | OUTPATIENT
Start: 2018-11-19 | End: 2020-06-08 | Stop reason: SDUPTHER

## 2018-11-27 ENCOUNTER — OFFICE VISIT (OUTPATIENT)
Dept: INTERNAL MEDICINE | Facility: CLINIC | Age: 83
End: 2018-11-27

## 2018-11-27 VITALS — BODY MASS INDEX: 31.01 KG/M2 | WEIGHT: 210 LBS | HEART RATE: 75 BPM | OXYGEN SATURATION: 92 %

## 2018-11-27 DIAGNOSIS — I48.92 PAROXYSMAL ATRIAL FLUTTER (HCC): ICD-10-CM

## 2018-11-27 DIAGNOSIS — R06.09 EXERTIONAL DYSPNEA: ICD-10-CM

## 2018-11-27 DIAGNOSIS — M79.89 LEFT LEG SWELLING: Primary | ICD-10-CM

## 2018-11-27 PROCEDURE — 99214 OFFICE O/P EST MOD 30 MIN: CPT | Performed by: INTERNAL MEDICINE

## 2018-11-27 RX ORDER — FUROSEMIDE 20 MG/1
20 TABLET ORAL DAILY
Qty: 30 TABLET | Refills: 1 | Status: SHIPPED | OUTPATIENT
Start: 2018-11-27 | End: 2019-01-24 | Stop reason: SDUPTHER

## 2018-11-27 NOTE — PROGRESS NOTES
"Chief Complaint   Patient presents with   • Foot Swelling     L foot       History of Present Illness   Cuauhtemoc Buck is a 83 y.o. male presents for acute care. He is concerned about leg swelling. This has been more pronounced in the last one month. He denies in changes in his medications. He has copd. Uses oxygen to ambulate and for physical activity. He reports that he does fine \"as long as I have my oxygen\". He is 3 liters with exercise and 3 liters routinely. Normal echo 5/18 with EF 57 %. Normal holter at that time. He is up about 5-7 pounds at this time compared to September.       The following portions of the patient's history were reviewed and updated as appropriate: allergies, current medications, past family history, past medical history, past social history, past surgical history and problem list.  Current Outpatient Medications on File Prior to Visit   Medication Sig Dispense Refill   • albuterol (PROVENTIL HFA) 108 (90 BASE) MCG/ACT inhaler Proventil  (90 Base) MCG/ACT Inhalation Aerosol Solution; Patient Sig: Proventil  (90 Base) MCG/ACT Inhalation Aerosol Solution INHALE TWO PUFFS BY MOUTH EVERY 6 HOURS AS NEEDED FOR SHORTNESS OF AIR; 60; 6; 27-Apr-2015; Active     • atorvastatin (LIPITOR) 20 MG tablet TAKE ONE TABLET BY MOUTH DAILY 30 tablet 4   • Coenzyme Q10 (CO Q-10) 100 MG capsule Take  by mouth.     • diltiaZEM CD (CARDIZEM CD) 120 MG 24 hr capsule Take 1 capsule by mouth Daily. 30 capsule 11   • fluticasone-salmeterol (ADVAIR DISKUS) 250-50 MCG/DOSE DISKUS Inhale 2 (Two) Times a Day.     • losartan-hydrochlorothiazide (HYZAAR) 100-12.5 MG per tablet TAKE 1 TABLET BY MOUTH DAILY 90 tablet 0   • SPIRIVA HANDIHALER 18 MCG per inhalation capsule INHALE ONE CAPSULE BY MOUTH DAILY 30 capsule 8   • vitamin B-12 (CYANOCOBALAMIN) 100 MCG tablet Take  by mouth.     • [DISCONTINUED] fluticasone-salmeterol (ADVAIR DISKUS) 500-50 MCG/DOSE DISKUS Inhale 1 puff 2 (Two) Times a Day. 60 each 3 "   • [DISCONTINUED] losartan-hydrochlorothiazide (HYZAAR) 100-12.5 MG per tablet TAKE 1 TABLET BY MOUTH DAILY 90 tablet 0     Current Facility-Administered Medications on File Prior to Visit   Medication Dose Route Frequency Provider Last Rate Last Dose   • levalbuterol (XOPENEX) nebulizer solution 0.63 mg  0.63 mg Nebulization Q6H PRN Padmini Jha MD   0.63 mg at 01/29/18 1333   • nitroglycerin (NITROSTAT) SL tablet 0.4 mg  0.4 mg Sublingual Q5 Min PRN Xavi East III, MD       • sodium chloride 0.9 % flush 10 mL  10 mL Intravenous PRN Xavi East III, MD         Review of Systems   Constitutional: Positive for fatigue.   HENT: Negative.    Eyes: Negative.    Respiratory: Positive for shortness of breath.    Cardiovascular: Positive for leg swelling.   Gastrointestinal: Negative.    Endocrine: Negative.    Genitourinary: Negative.    Musculoskeletal: Positive for arthralgias.   Skin: Negative.    Allergic/Immunologic: Negative.    Neurological: Negative.    Hematological: Negative.    Psychiatric/Behavioral: Negative.        Objective   Physical Exam   Constitutional: He is oriented to person, place, and time. He appears well-developed and well-nourished.   HENT:   Head: Normocephalic and atraumatic.   Eyes: EOM are normal. Pupils are equal, round, and reactive to light.   Cardiovascular: Normal rate, regular rhythm, normal heart sounds and intact distal pulses.   Left leg 10 1/2'  Right leg 9 1/2'  Pitting edema left >right.    Pulmonary/Chest: Effort normal and breath sounds normal.   Abdominal: Soft. Bowel sounds are normal.   Musculoskeletal: Normal range of motion.   Neurological: He is alert and oriented to person, place, and time.   Skin: Skin is warm and dry.   Psychiatric: He has a normal mood and affect. His behavior is normal. Judgment and thought content normal.   Nursing note and vitals reviewed.       Pulse 75   Wt 95.3 kg (210 lb)   SpO2 92%   BMI 31.01 kg/m²     Assessment/Plan    Diagnoses and all orders for this visit:    Left leg swelling  -     Duplex Venous Lower Extremity - Left CAR; Future  -     Basic Metabolic Panel  -     proBNP  -     D-dimer, Quantitative    Paroxysmal atrial flutter (CMS/HCC)  -     Basic Metabolic Panel  -     proBNP  -     D-dimer, Quantitative    Exertional dyspnea  -     Basic Metabolic Panel  -     proBNP  -     D-dimer, Quantitative    Other orders  -     furosemide (LASIX) 20 MG tablet; Take 1 tablet by mouth Daily.      Patient with acute swelling of the leg. Will test listed labs. If d dimer is neg or stable will just elevate and take furosemide one daily for 5 days. Will test bmp and bnp. He is dyspnic but his oxygen tank ran out during appointment. Used in office oxygen tank and patient had an auxillary tank in his car. He did feel better once he restarted oxygen and sats increased from 88 to 95 %. Will have a close follow up.

## 2018-11-28 LAB
BUN SERPL-MCNC: 16 MG/DL (ref 8–23)
BUN/CREAT SERPL: 16.8 (ref 7–25)
CALCIUM SERPL-MCNC: 9.8 MG/DL (ref 8.6–10.5)
CHLORIDE SERPL-SCNC: 102 MMOL/L (ref 98–107)
CO2 SERPL-SCNC: 21.6 MMOL/L (ref 22–29)
CREAT SERPL-MCNC: 0.95 MG/DL (ref 0.76–1.27)
D DIMER PPP FEU-MCNC: 0.44 MCGFEU/ML (ref 0–0.49)
GLUCOSE SERPL-MCNC: 94 MG/DL (ref 65–99)
NT-PROBNP SERPL-MCNC: 90 PG/ML (ref 0–486)
POTASSIUM SERPL-SCNC: 4.3 MMOL/L (ref 3.5–5.2)
SODIUM SERPL-SCNC: 140 MMOL/L (ref 136–145)

## 2018-12-03 ENCOUNTER — APPOINTMENT (OUTPATIENT)
Dept: CARDIOLOGY | Facility: HOSPITAL | Age: 83
End: 2018-12-03

## 2019-01-24 RX ORDER — FUROSEMIDE 20 MG/1
TABLET ORAL
Qty: 30 TABLET | Refills: 6 | Status: SHIPPED | OUTPATIENT
Start: 2019-01-24 | End: 2019-06-10 | Stop reason: SDUPTHER

## 2019-02-05 ENCOUNTER — OFFICE VISIT (OUTPATIENT)
Dept: INTERNAL MEDICINE | Facility: CLINIC | Age: 84
End: 2019-02-05

## 2019-02-05 VITALS
BODY MASS INDEX: 31.55 KG/M2 | HEIGHT: 69 IN | SYSTOLIC BLOOD PRESSURE: 134 MMHG | DIASTOLIC BLOOD PRESSURE: 72 MMHG | HEART RATE: 77 BPM | WEIGHT: 213 LBS | RESPIRATION RATE: 18 BRPM | OXYGEN SATURATION: 92 %

## 2019-02-05 DIAGNOSIS — R29.898 WEAKNESS OF BOTH LOWER EXTREMITIES: ICD-10-CM

## 2019-02-05 DIAGNOSIS — M25.561 ACUTE PAIN OF RIGHT KNEE: Primary | ICD-10-CM

## 2019-02-05 DIAGNOSIS — R53.83 FATIGUE, UNSPECIFIED TYPE: ICD-10-CM

## 2019-02-05 DIAGNOSIS — J44.9 CHRONIC OBSTRUCTIVE PULMONARY DISEASE, UNSPECIFIED COPD TYPE (HCC): ICD-10-CM

## 2019-02-05 DIAGNOSIS — E55.9 VITAMIN D DEFICIENCY: ICD-10-CM

## 2019-02-05 PROCEDURE — 99214 OFFICE O/P EST MOD 30 MIN: CPT | Performed by: INTERNAL MEDICINE

## 2019-02-05 RX ORDER — LEVOFLOXACIN 500 MG/1
TABLET, FILM COATED ORAL
COMMUNITY
Start: 2019-02-01 | End: 2019-07-08

## 2019-02-05 NOTE — PROGRESS NOTES
Chief Complaint   Patient presents with   • Knee Pain     Mostly the right knee, worsening pain x2-3 months    • Muscle Pain     States wosrening muscle aches.        History of Present Illness   Cuauhtemoc Buck is a 83 y.o. male presents for acute needs. He is having knee pain. Notes right > left knee pain. Difficulty going from sitting to standing due to knee weakness.   In general fitness is more limited. He reports that after a few minutes his oxygen saturations drop. He went to pulmonary in November. He has COPD. He is using advair and spiriva with albuterol on an as needed basis.       The following portions of the patient's history were reviewed and updated as appropriate: allergies, current medications, past family history, past medical history, past social history, past surgical history and problem list.  Current Outpatient Medications on File Prior to Visit   Medication Sig Dispense Refill   • albuterol (PROVENTIL HFA) 108 (90 BASE) MCG/ACT inhaler Proventil  (90 Base) MCG/ACT Inhalation Aerosol Solution; Patient Sig: Proventil  (90 Base) MCG/ACT Inhalation Aerosol Solution INHALE TWO PUFFS BY MOUTH EVERY 6 HOURS AS NEEDED FOR SHORTNESS OF AIR; 60; 6; 27-Apr-2015; Active     • atorvastatin (LIPITOR) 20 MG tablet TAKE ONE TABLET BY MOUTH DAILY 30 tablet 4   • Coenzyme Q10 (CO Q-10) 100 MG capsule Take  by mouth.     • diltiaZEM CD (CARDIZEM CD) 120 MG 24 hr capsule Take 1 capsule by mouth Daily. 30 capsule 11   • fluticasone-salmeterol (ADVAIR DISKUS) 250-50 MCG/DOSE DISKUS Inhale 2 (Two) Times a Day.     • furosemide (LASIX) 20 MG tablet TAKE ONE TABLET BY MOUTH DAILY 30 tablet 6   • levoFLOXacin (LEVAQUIN) 500 MG tablet      • losartan-hydrochlorothiazide (HYZAAR) 100-12.5 MG per tablet TAKE 1 TABLET BY MOUTH DAILY 90 tablet 0   • nystatin (MYCOSTATIN) 578378 UNIT/ML suspension      • SPIRIVA HANDIHALER 18 MCG per inhalation capsule INHALE ONE CAPSULE BY MOUTH DAILY 30 capsule 8   • vitamin  "B-12 (CYANOCOBALAMIN) 100 MCG tablet Take  by mouth.       Current Facility-Administered Medications on File Prior to Visit   Medication Dose Route Frequency Provider Last Rate Last Dose   • levalbuterol (XOPENEX) nebulizer solution 0.63 mg  0.63 mg Nebulization Q6H PRN Padmini Jha MD   0.63 mg at 01/29/18 1333   • nitroglycerin (NITROSTAT) SL tablet 0.4 mg  0.4 mg Sublingual Q5 Min PRN Xavi East III, MD       • sodium chloride 0.9 % flush 10 mL  10 mL Intravenous PRN Xavi East III, MD         Review of Systems   Constitutional: Negative.    HENT: Negative.    Eyes: Negative.    Respiratory: Positive for shortness of breath.    Cardiovascular: Negative.    Gastrointestinal: Negative.    Endocrine: Negative.    Musculoskeletal:        Kneepain  Leg weakness    Skin: Negative.    Allergic/Immunologic: Negative.    Neurological: Negative.    Hematological: Negative.    Psychiatric/Behavioral: Negative.        Objective   Physical Exam   Constitutional: He is oriented to person, place, and time. He appears well-developed and well-nourished.   HENT:   Head: Normocephalic and atraumatic.   Right Ear: External ear normal.   Left Ear: External ear normal.   Mouth/Throat: Oropharynx is clear and moist.   Eyes: EOM are normal. Pupils are equal, round, and reactive to light.   Neck: Normal range of motion. Neck supple.   Cardiovascular: Normal rate, regular rhythm and normal heart sounds.   Pulmonary/Chest: Effort normal.   Abdominal: Soft. Bowel sounds are normal.   Musculoskeletal:   From. Mild anterior/ patellar region knee pain  No weakness noted   Neurological: He is alert and oriented to person, place, and time.   Skin: Skin is warm and dry.   Psychiatric: He has a normal mood and affect. His behavior is normal. Judgment and thought content normal.   Nursing note and vitals reviewed.       /72   Pulse 77   Resp 18   Ht 175.3 cm (69.02\")   Wt 96.6 kg (213 lb)   SpO2 92%   BMI 31.44 kg/m² "     Assessment/Plan   Diagnoses and all orders for this visit:    Acute pain of right knee  -     Ambulatory Referral to Orthopedic Surgery    Chronic obstructive pulmonary disease, unspecified COPD type (CMS/HCC)    Other orders  -     levoFLOXacin (LEVAQUIN) 500 MG tablet;   -     nystatin (MYCOSTATIN) 488855 UNIT/ML suspension;       Patient w/ copd and increased dyspnea. He will need to increase advair to 500/50. Do not have samples at this time for advair so gave patient sample of symbicort 160mg. He will be referred to ortho for further eval of his knee pain. Offered physical therapy but he declines as he has a  he would like to work with. He will follow up with pulmonary routinely. He may use tylenol as needed. Will test listed labs given h/o weakness.

## 2019-02-06 LAB
25(OH)D3+25(OH)D2 SERPL-MCNC: 44.4 NG/ML (ref 30–100)
BASOPHILS # BLD AUTO: 0.03 10*3/MM3 (ref 0–0.2)
BASOPHILS NFR BLD AUTO: 0.5 % (ref 0–1.5)
BUN SERPL-MCNC: 16 MG/DL (ref 8–23)
BUN/CREAT SERPL: 17.6 (ref 7–25)
CALCIUM SERPL-MCNC: 9.7 MG/DL (ref 8.6–10.5)
CHLORIDE SERPL-SCNC: 100 MMOL/L (ref 98–107)
CO2 SERPL-SCNC: 24.2 MMOL/L (ref 22–29)
CREAT SERPL-MCNC: 0.91 MG/DL (ref 0.76–1.27)
EOSINOPHIL # BLD AUTO: 0.28 10*3/MM3 (ref 0–0.7)
EOSINOPHIL NFR BLD AUTO: 4.5 % (ref 0.3–6.2)
ERYTHROCYTE [DISTWIDTH] IN BLOOD BY AUTOMATED COUNT: 13.7 % (ref 11.5–14.5)
GLUCOSE SERPL-MCNC: 95 MG/DL (ref 65–99)
HCT VFR BLD AUTO: 43 % (ref 40.4–52.2)
HGB BLD-MCNC: 14.2 G/DL (ref 13.7–17.6)
IMM GRANULOCYTES # BLD AUTO: 0 10*3/MM3 (ref 0–0.03)
IMM GRANULOCYTES NFR BLD AUTO: 0 % (ref 0–0.5)
LYMPHOCYTES # BLD AUTO: 1.9 10*3/MM3 (ref 0.9–4.8)
LYMPHOCYTES NFR BLD AUTO: 30.4 % (ref 19.6–45.3)
MAGNESIUM SERPL-MCNC: 2.1 MG/DL (ref 1.6–2.4)
MCH RBC QN AUTO: 33.3 PG (ref 27–32.7)
MCHC RBC AUTO-ENTMCNC: 33 G/DL (ref 32.6–36.4)
MCV RBC AUTO: 100.7 FL (ref 79.8–96.2)
MONOCYTES # BLD AUTO: 1.08 10*3/MM3 (ref 0.2–1.2)
MONOCYTES NFR BLD AUTO: 17.3 % (ref 5–12)
NEUTROPHILS # BLD AUTO: 2.96 10*3/MM3 (ref 1.9–8.1)
NEUTROPHILS NFR BLD AUTO: 47.3 % (ref 42.7–76)
PLATELET # BLD AUTO: 297 10*3/MM3 (ref 140–500)
POTASSIUM SERPL-SCNC: 4.3 MMOL/L (ref 3.5–5.2)
RBC # BLD AUTO: 4.27 10*6/MM3 (ref 4.6–6)
SODIUM SERPL-SCNC: 138 MMOL/L (ref 136–145)
TSH SERPL DL<=0.005 MIU/L-ACNC: 5.08 MIU/ML (ref 0.27–4.2)
WBC # BLD AUTO: 6.25 10*3/MM3 (ref 4.5–10.7)

## 2019-02-15 RX ORDER — LOSARTAN POTASSIUM AND HYDROCHLOROTHIAZIDE 12.5; 1 MG/1; MG/1
TABLET ORAL
Qty: 90 TABLET | Refills: 0 | Status: SHIPPED | OUTPATIENT
Start: 2019-02-15 | End: 2019-05-18 | Stop reason: SDUPTHER

## 2019-05-20 RX ORDER — LOSARTAN POTASSIUM AND HYDROCHLOROTHIAZIDE 12.5; 1 MG/1; MG/1
TABLET ORAL
Qty: 90 TABLET | Refills: 0 | Status: SHIPPED | OUTPATIENT
Start: 2019-05-20 | End: 2019-06-04 | Stop reason: SDUPTHER

## 2019-05-28 RX ORDER — DILTIAZEM HYDROCHLORIDE 120 MG/1
CAPSULE, EXTENDED RELEASE ORAL
Qty: 30 CAPSULE | Refills: 5 | OUTPATIENT
Start: 2019-05-28

## 2019-06-04 ENCOUNTER — OFFICE VISIT (OUTPATIENT)
Dept: INTERNAL MEDICINE | Facility: CLINIC | Age: 84
End: 2019-06-04

## 2019-06-04 VITALS
DIASTOLIC BLOOD PRESSURE: 66 MMHG | OXYGEN SATURATION: 91 % | HEART RATE: 67 BPM | SYSTOLIC BLOOD PRESSURE: 136 MMHG | BODY MASS INDEX: 31.29 KG/M2 | WEIGHT: 212 LBS

## 2019-06-04 DIAGNOSIS — E78.5 HYPERLIPIDEMIA, UNSPECIFIED HYPERLIPIDEMIA TYPE: ICD-10-CM

## 2019-06-04 DIAGNOSIS — Z12.5 SCREENING PSA (PROSTATE SPECIFIC ANTIGEN): ICD-10-CM

## 2019-06-04 DIAGNOSIS — J44.9 CHRONIC OBSTRUCTIVE PULMONARY DISEASE, UNSPECIFIED COPD TYPE (HCC): ICD-10-CM

## 2019-06-04 DIAGNOSIS — I10 BENIGN ESSENTIAL HYPERTENSION: ICD-10-CM

## 2019-06-04 DIAGNOSIS — E03.8 SUBCLINICAL HYPOTHYROIDISM: ICD-10-CM

## 2019-06-04 DIAGNOSIS — R35.0 URINARY FREQUENCY: Primary | ICD-10-CM

## 2019-06-04 PROCEDURE — 99214 OFFICE O/P EST MOD 30 MIN: CPT | Performed by: INTERNAL MEDICINE

## 2019-06-04 RX ORDER — LOSARTAN POTASSIUM 100 MG/1
100 TABLET ORAL DAILY
Qty: 90 TABLET | Refills: 3 | Status: SHIPPED | OUTPATIENT
Start: 2019-06-04 | End: 2019-08-26 | Stop reason: DRUGHIGH

## 2019-06-04 RX ORDER — DOXAZOSIN 2 MG/1
2 TABLET ORAL NIGHTLY
Qty: 30 TABLET | Refills: 5 | Status: SHIPPED | OUTPATIENT
Start: 2019-06-04 | End: 2019-11-29 | Stop reason: SDUPTHER

## 2019-06-04 NOTE — PROGRESS NOTES
"Chief Complaint   Patient presents with   • Hyperlipidemia   • Hypertension   • Hypothyroidism       History of Present Illness   Cuauhtemoc Bukc is a 84 y.o. male presents for follow up evaluation. Patient in general is doing well. He reports some increasing joint pains. Predominantly left shoulder. Sleeps on a couch as spouse is in ill health and gets up frequently in the night. Patient is having increasing urinary frequency. Does not always get a healthy stream. He does have some urinary incontinence. Predominantly when he \"waits too long\". He is on lasix as well as hctz. He is requiring a pad for leakage. He is having increasing symptoms for copd in that he can not do as much as he previously was able to do. He is attending pulm rehab regularly. Using advair bid and spiriva qd.       The following portions of the patient's history were reviewed and updated as appropriate: allergies, current medications, past family history, past medical history, past social history, past surgical history and problem list.  Current Outpatient Medications on File Prior to Visit   Medication Sig Dispense Refill   • albuterol (PROVENTIL HFA) 108 (90 BASE) MCG/ACT inhaler Proventil  (90 Base) MCG/ACT Inhalation Aerosol Solution; Patient Sig: Proventil  (90 Base) MCG/ACT Inhalation Aerosol Solution INHALE TWO PUFFS BY MOUTH EVERY 6 HOURS AS NEEDED FOR SHORTNESS OF AIR; 60; 6; 27-Apr-2015; Active     • atorvastatin (LIPITOR) 20 MG tablet TAKE ONE TABLET BY MOUTH DAILY 30 tablet 4   • Coenzyme Q10 (CO Q-10) 100 MG capsule Take  by mouth.     • diltiaZEM CD (CARDIZEM CD) 120 MG 24 hr capsule Take 1 capsule by mouth Daily. 30 capsule 11   • fluticasone-salmeterol (ADVAIR DISKUS) 250-50 MCG/DOSE DISKUS Inhale 2 (Two) Times a Day.     • furosemide (LASIX) 20 MG tablet TAKE ONE TABLET BY MOUTH DAILY 30 tablet 6   • nystatin (MYCOSTATIN) [id number] UNIT/ML suspension      • SPIRIVA HANDIHALER 18 MCG per inhalation capsule INHALE ONE " CAPSULE BY MOUTH DAILY 30 capsule 8   • vitamin B-12 (CYANOCOBALAMIN) 100 MCG tablet Take  by mouth.     • [DISCONTINUED] losartan-hydrochlorothiazide (HYZAAR) 100-12.5 MG per tablet TAKE 1 TABLET BY MOUTH DAILY 90 tablet 0   • levoFLOXacin (LEVAQUIN) 500 MG tablet      • [DISCONTINUED] losartan-hydrochlorothiazide (HYZAAR) 100-12.5 MG per tablet TAKE ONE TABLET BY MOUTH DAILY 90 tablet 0     Current Facility-Administered Medications on File Prior to Visit   Medication Dose Route Frequency Provider Last Rate Last Dose   • levalbuterol (XOPENEX) nebulizer solution 0.63 mg  0.63 mg Nebulization Q6H PRN Padmini Jha MD   0.63 mg at 01/29/18 1333   • nitroglycerin (NITROSTAT) SL tablet 0.4 mg  0.4 mg Sublingual Q5 Min PRN Xavi East III, MD       • sodium chloride 0.9 % flush 10 mL  10 mL Intravenous PRN Xavi East III, MD         Review of Systems   Constitutional: Positive for fatigue.   HENT: Negative.    Eyes: Negative.    Respiratory: Positive for shortness of breath.    Cardiovascular: Negative.    Gastrointestinal: Negative.    Endocrine: Negative.    Genitourinary: Positive for frequency.        Urinary leakage   Musculoskeletal: Positive for arthralgias.   Skin: Negative.    Allergic/Immunologic: Negative.    Neurological: Negative.    Hematological: Negative.    Psychiatric/Behavioral: Negative.        Objective   Physical Exam   Constitutional: He is oriented to person, place, and time. He appears well-developed and well-nourished.   HENT:   Head: Normocephalic and atraumatic.   Right Ear: External ear normal.   Left Ear: External ear normal.   Mouth/Throat: Oropharynx is clear and moist.   Eyes: Conjunctivae and EOM are normal. Pupils are equal, round, and reactive to light.   Neck: Normal range of motion. Neck supple.   Cardiovascular: Normal rate, regular rhythm and normal heart sounds.   Pulmonary/Chest:   Decreased breath sounds/ chronic.    Abdominal: Soft. Bowel sounds are normal.    Musculoskeletal: Normal range of motion.   Neurological: He is alert and oriented to person, place, and time.   Skin: Skin is warm and dry.   Psychiatric: He has a normal mood and affect. His behavior is normal. Judgment and thought content normal.   Nursing note and vitals reviewed.       /66   Pulse 67   Wt 96.2 kg (212 lb)   SpO2 91%   BMI 31.29 kg/m²     Assessment/Plan   Diagnoses and all orders for this visit:    Urinary frequency  -     Urinalysis With Culture If Indicated - Urine, Clean Catch    Benign essential hypertension    Hyperlipidemia, unspecified hyperlipidemia type  -     Comprehensive Metabolic Panel  -     TSH  -     T4, Free  -     Lipid Panel With LDL / HDL Ratio    Subclinical hypothyroidism  -     Comprehensive Metabolic Panel  -     TSH  -     T4, Free    Screening PSA (prostate specific antigen)  -     PSA Screen    Other orders  -     losartan (COZAAR) 100 MG tablet; Take 1 tablet by mouth Daily.  -     doxazosin (CARDURA) 2 MG tablet; Take 1 tablet by mouth Every Night.      Patient w/ copd. Continue inhalers. Supplemental oxygen. pulm rehab. He will start cardura for what sounds like bph. He will have a psa screening test. He will have listed labs. Will montiro bp and lipids he will stop hctz due to urinary frequency. Will check urine today. He will follow have a bp and bmp test in 2 weeks and will follow up in 6 months or prn.

## 2019-06-05 LAB
ALBUMIN SERPL-MCNC: 4 G/DL (ref 3.5–5.2)
ALBUMIN/GLOB SERPL: 1.3 G/DL
ALP SERPL-CCNC: 54 U/L (ref 39–117)
ALT SERPL-CCNC: 30 U/L (ref 1–41)
APPEARANCE UR: CLEAR
AST SERPL-CCNC: 36 U/L (ref 1–40)
BACTERIA #/AREA URNS HPF: NORMAL /HPF
BILIRUB SERPL-MCNC: 0.9 MG/DL (ref 0.2–1.2)
BILIRUB UR QL STRIP: NEGATIVE
BUN SERPL-MCNC: 16 MG/DL (ref 8–23)
BUN/CREAT SERPL: 16.8 (ref 7–25)
CALCIUM SERPL-MCNC: 10 MG/DL (ref 8.6–10.5)
CHLORIDE SERPL-SCNC: 102 MMOL/L (ref 98–107)
CHOLEST SERPL-MCNC: 134 MG/DL (ref 0–200)
CO2 SERPL-SCNC: 25.2 MMOL/L (ref 22–29)
COLOR UR: YELLOW
CREAT SERPL-MCNC: 0.95 MG/DL (ref 0.76–1.27)
EPI CELLS #/AREA URNS HPF: NORMAL /HPF
GLOBULIN SER CALC-MCNC: 3.2 GM/DL
GLUCOSE SERPL-MCNC: 100 MG/DL (ref 65–99)
GLUCOSE UR QL: NEGATIVE
HDLC SERPL-MCNC: 72 MG/DL (ref 40–60)
HGB UR QL STRIP: NEGATIVE
KETONES UR QL STRIP: NEGATIVE
LDLC SERPL CALC-MCNC: 53 MG/DL (ref 0–100)
LDLC/HDLC SERPL: 0.73 {RATIO}
LEUKOCYTE ESTERASE UR QL STRIP: NEGATIVE
MICRO URNS: NORMAL
MICRO URNS: NORMAL
MUCOUS THREADS URNS QL MICRO: PRESENT /HPF
NITRITE UR QL STRIP: NEGATIVE
PH UR STRIP: 7.5 [PH] (ref 5–7.5)
POTASSIUM SERPL-SCNC: 4.1 MMOL/L (ref 3.5–5.2)
PROT SERPL-MCNC: 7.2 G/DL (ref 6–8.5)
PROT UR QL STRIP: NEGATIVE
PSA SERPL-MCNC: 0.68 NG/ML (ref 0–4)
RBC #/AREA URNS HPF: NORMAL /HPF
SODIUM SERPL-SCNC: 138 MMOL/L (ref 136–145)
SP GR UR: 1.01 (ref 1–1.03)
T4 FREE SERPL-MCNC: 1.06 NG/DL (ref 0.93–1.7)
TRIGL SERPL-MCNC: 47 MG/DL (ref 0–150)
TSH SERPL DL<=0.005 MIU/L-ACNC: 2.86 MIU/ML (ref 0.27–4.2)
URINALYSIS REFLEX: NORMAL
UROBILINOGEN UR STRIP-MCNC: 1 MG/DL (ref 0.2–1)
VLDLC SERPL CALC-MCNC: 9.4 MG/DL
WBC #/AREA URNS HPF: NORMAL /HPF

## 2019-06-06 RX ORDER — ATORVASTATIN CALCIUM 20 MG/1
20 TABLET, FILM COATED ORAL DAILY
Qty: 90 TABLET | Refills: 1 | Status: SHIPPED | OUTPATIENT
Start: 2019-06-06 | End: 2019-06-10 | Stop reason: SDUPTHER

## 2019-06-10 RX ORDER — ATORVASTATIN CALCIUM 20 MG/1
20 TABLET, FILM COATED ORAL DAILY
Qty: 90 TABLET | Refills: 1 | Status: SHIPPED | OUTPATIENT
Start: 2019-06-10 | End: 2019-06-14 | Stop reason: SDUPTHER

## 2019-06-10 RX ORDER — DILTIAZEM HYDROCHLORIDE 120 MG/1
120 CAPSULE, COATED, EXTENDED RELEASE ORAL DAILY
Qty: 90 CAPSULE | Refills: 1 | Status: SHIPPED | OUTPATIENT
Start: 2019-06-10 | End: 2019-06-14 | Stop reason: SDUPTHER

## 2019-06-10 RX ORDER — FUROSEMIDE 20 MG/1
20 TABLET ORAL DAILY
Qty: 90 TABLET | Refills: 1 | Status: SHIPPED | OUTPATIENT
Start: 2019-06-10 | End: 2019-06-14 | Stop reason: SDUPTHER

## 2019-06-14 RX ORDER — ATORVASTATIN CALCIUM 20 MG/1
20 TABLET, FILM COATED ORAL DAILY
Qty: 90 TABLET | Refills: 1 | Status: SHIPPED | OUTPATIENT
Start: 2019-06-14 | End: 2020-03-16

## 2019-06-14 RX ORDER — DILTIAZEM HYDROCHLORIDE 120 MG/1
120 CAPSULE, COATED, EXTENDED RELEASE ORAL DAILY
Qty: 90 CAPSULE | Refills: 1 | Status: SHIPPED | OUTPATIENT
Start: 2019-06-14 | End: 2019-08-26 | Stop reason: DRUGHIGH

## 2019-06-14 RX ORDER — FUROSEMIDE 20 MG/1
20 TABLET ORAL DAILY
Qty: 90 TABLET | Refills: 1 | Status: SHIPPED | OUTPATIENT
Start: 2019-06-14 | End: 2019-07-01 | Stop reason: SDUPTHER

## 2019-07-01 ENCOUNTER — OFFICE VISIT (OUTPATIENT)
Dept: INTERNAL MEDICINE | Facility: CLINIC | Age: 84
End: 2019-07-01

## 2019-07-01 VITALS
WEIGHT: 215 LBS | BODY MASS INDEX: 31.74 KG/M2 | DIASTOLIC BLOOD PRESSURE: 56 MMHG | HEART RATE: 69 BPM | OXYGEN SATURATION: 93 % | SYSTOLIC BLOOD PRESSURE: 114 MMHG

## 2019-07-01 DIAGNOSIS — R60.9 PERIPHERAL EDEMA: Primary | ICD-10-CM

## 2019-07-01 DIAGNOSIS — R06.00 DYSPNEA, UNSPECIFIED TYPE: ICD-10-CM

## 2019-07-01 PROCEDURE — 99214 OFFICE O/P EST MOD 30 MIN: CPT | Performed by: INTERNAL MEDICINE

## 2019-07-01 RX ORDER — FUROSEMIDE 20 MG/1
20 TABLET ORAL 2 TIMES DAILY
Qty: 180 TABLET | Refills: 1 | Status: SHIPPED | OUTPATIENT
Start: 2019-07-01 | End: 2019-07-22

## 2019-07-01 NOTE — PROGRESS NOTES
Chief Complaint   Patient presents with   • Edema     ankles and feet   • Hypertension       History of Present Illness   Cuauhtemoc Buck is a 84 y.o. male presents for acute care. He is having increasing leg edema. Previously switched from hctz to lasix related to edema. Unfortunately his edema has increased. He has continued dyspnea. Known copd. Previously tolerated a fair amount of physical activity but now his activity is more limited. But He does try to do an hour or more of treadmill or gazelle. dyspnic w/ limited activity in office.       The following portions of the patient's history were reviewed and updated as appropriate: allergies, current medications, past family history, past medical history, past social history, past surgical history and problem list.  Current Outpatient Medications on File Prior to Visit   Medication Sig Dispense Refill   • albuterol (PROVENTIL HFA) 108 (90 BASE) MCG/ACT inhaler Proventil  (90 Base) MCG/ACT Inhalation Aerosol Solution; Patient Sig: Proventil  (90 Base) MCG/ACT Inhalation Aerosol Solution INHALE TWO PUFFS BY MOUTH EVERY 6 HOURS AS NEEDED FOR SHORTNESS OF AIR; 60; 6; 27-Apr-2015; Active     • atorvastatin (LIPITOR) 20 MG tablet Take 1 tablet by mouth Daily. 90 tablet 1   • Coenzyme Q10 (CO Q-10) 100 MG capsule Take  by mouth.     • diltiaZEM CD (CARDIZEM CD) 120 MG 24 hr capsule Take 1 capsule by mouth Daily. 90 capsule 1   • doxazosin (CARDURA) 2 MG tablet Take 1 tablet by mouth Every Night. 30 tablet 5   • fluticasone-salmeterol (ADVAIR DISKUS) 250-50 MCG/DOSE DISKUS Inhale 2 (Two) Times a Day.     • furosemide (LASIX) 20 MG tablet Take 1 tablet by mouth Daily. 90 tablet 1   • levoFLOXacin (LEVAQUIN) 500 MG tablet      • losartan (COZAAR) 100 MG tablet Take 1 tablet by mouth Daily. 90 tablet 3   • nystatin (MYCOSTATIN) [id number] UNIT/ML suspension      • SPIRIVA HANDIHALER 18 MCG per inhalation capsule INHALE ONE CAPSULE BY MOUTH DAILY 30 capsule 8   •  "vitamin B-12 (CYANOCOBALAMIN) 100 MCG tablet Take  by mouth.       Current Facility-Administered Medications on File Prior to Visit   Medication Dose Route Frequency Provider Last Rate Last Dose   • levalbuterol (XOPENEX) nebulizer solution 0.63 mg  0.63 mg Nebulization Q6H PRN Padmini Jha MD   0.63 mg at 01/29/18 1333   • nitroglycerin (NITROSTAT) SL tablet 0.4 mg  0.4 mg Sublingual Q5 Min PRN Xavi East III, MD       • sodium chloride 0.9 % flush 10 mL  10 mL Intravenous PRN Xavi East III, MD         Review of Systems   Constitutional: Positive for fatigue.   HENT: Negative.    Eyes: Negative.    Respiratory: Positive for shortness of breath.    Cardiovascular: Positive for leg swelling.   Gastrointestinal: Negative.    Endocrine: Negative.    Musculoskeletal: Negative.    Skin: Negative.    Allergic/Immunologic: Negative.    Neurological: Negative.    Hematological: Negative.    Psychiatric/Behavioral: Negative.        Objective   Physical Exam   Constitutional: He is oriented to person, place, and time. He appears well-developed and well-nourished.   HENT:   Head: Normocephalic and atraumatic.   Right Ear: External ear normal.   Left Ear: External ear normal.   Mouth/Throat: Oropharynx is clear and moist.   Eyes: EOM are normal. Pupils are equal, round, and reactive to light.   Neck: Normal range of motion. Neck supple.   Cardiovascular: Normal rate and regular rhythm.   2+ pitting peripheral edema. LLE 11.5 \" right 11.25\"   Pulmonary/Chest:   dyspnic at rest. o2 dependent.    Abdominal: Soft. Bowel sounds are normal.   Musculoskeletal: Normal range of motion.   Neurological: He is alert and oriented to person, place, and time.   Skin: Skin is warm and dry.   Psychiatric: He has a normal mood and affect. His behavior is normal. Judgment and thought content normal.   Nursing note and vitals reviewed.       /56   Pulse 69   Wt 97.5 kg (215 lb)   SpO2 93%   BMI 31.74 kg/m² "     Assessment/Plan   Diagnoses and all orders for this visit:    Peripheral edema  -     Basic Metabolic Panel  -     proBNP    Dyspnea, unspecified type  -     Basic Metabolic Panel  -     proBNP      Peripheral edema. Likely multifactorial with ccb usage, veinous insuff. Will again chedk a bnp and bmp. Trial of increasing furosemide to 2 daily. He will follow up in 2 weeks or prn.

## 2019-07-02 LAB
BUN SERPL-MCNC: 15 MG/DL (ref 8–23)
BUN/CREAT SERPL: 16.7 (ref 7–25)
CALCIUM SERPL-MCNC: 8.8 MG/DL (ref 8.6–10.5)
CHLORIDE SERPL-SCNC: 103 MMOL/L (ref 98–107)
CO2 SERPL-SCNC: 24.4 MMOL/L (ref 22–29)
CREAT SERPL-MCNC: 0.9 MG/DL (ref 0.76–1.27)
GLUCOSE SERPL-MCNC: 106 MG/DL (ref 65–99)
NT-PROBNP SERPL-MCNC: 192 PG/ML (ref 0–486)
POTASSIUM SERPL-SCNC: 4.1 MMOL/L (ref 3.5–5.2)
SODIUM SERPL-SCNC: 139 MMOL/L (ref 136–145)

## 2019-07-03 ENCOUNTER — TELEPHONE (OUTPATIENT)
Dept: INTERNAL MEDICINE | Facility: CLINIC | Age: 84
End: 2019-07-03

## 2019-07-03 RX ORDER — HYDROCHLOROTHIAZIDE 25 MG/1
25 TABLET ORAL DAILY
Qty: 30 TABLET | Refills: 3 | Status: SHIPPED | OUTPATIENT
Start: 2019-07-03 | End: 2019-07-08

## 2019-07-03 NOTE — TELEPHONE ENCOUNTER
"While telling pt of new rx he mentioned that from 9 pm to 1 am pts heart rate was \"jumping around\" he could feel it getting faster and then slow for several hours and then it went away/  I encouraged pt to go to ED if happened again,. He agreed.  Pt said this has happened before. He does not have a cardio DR  "

## 2019-07-05 DIAGNOSIS — I48.20 CHRONIC ATRIAL FIBRILLATION (HCC): Primary | ICD-10-CM

## 2019-07-05 NOTE — TELEPHONE ENCOUNTER
He has been a patient of Dr. Trejo for many years. He has known atrial fibrillation. He should follow up w/ dr. Trejo as it looks like it has been a year since he went there. I will put the referral in the chart.

## 2019-07-08 ENCOUNTER — OFFICE VISIT (OUTPATIENT)
Dept: CARDIOLOGY | Facility: CLINIC | Age: 84
End: 2019-07-08

## 2019-07-08 VITALS
RESPIRATION RATE: 16 BRPM | HEART RATE: 67 BPM | DIASTOLIC BLOOD PRESSURE: 70 MMHG | HEIGHT: 69 IN | SYSTOLIC BLOOD PRESSURE: 122 MMHG | WEIGHT: 211.8 LBS | BODY MASS INDEX: 31.37 KG/M2

## 2019-07-08 DIAGNOSIS — I10 BENIGN ESSENTIAL HYPERTENSION: ICD-10-CM

## 2019-07-08 DIAGNOSIS — E78.5 HYPERLIPIDEMIA, UNSPECIFIED HYPERLIPIDEMIA TYPE: ICD-10-CM

## 2019-07-08 DIAGNOSIS — I48.92 PAROXYSMAL ATRIAL FLUTTER (HCC): Primary | ICD-10-CM

## 2019-07-08 DIAGNOSIS — I10 ESSENTIAL HYPERTENSION: ICD-10-CM

## 2019-07-08 DIAGNOSIS — R00.2 PALPITATIONS: ICD-10-CM

## 2019-07-08 PROCEDURE — 93000 ELECTROCARDIOGRAM COMPLETE: CPT | Performed by: INTERNAL MEDICINE

## 2019-07-08 PROCEDURE — 99214 OFFICE O/P EST MOD 30 MIN: CPT | Performed by: INTERNAL MEDICINE

## 2019-07-08 NOTE — PROGRESS NOTES
PATIENTINFORMATION    Date of Office Visit: 2019  Encounter Provider: Peggy Trejo MD  Place of Service: [facility] CARDIOLOGY  Patient Name: Cuauhtemoc Buck  : 1935    Subjective:     Encounter Date:2019      Patient ID: Cuauhtemoc Buck is a 84 y.o. male.      History of Present Illness    This is a very pleasant gentleman referred her by Dr. Jha for dyspnea.   He was seen here in 2013 because of an episode of tachycardia while he was exercising.   At that time, he had an echo which showed normal left ventricular systolic function with an ejection fraction of 66%.  He had normal diastolic function, and no significant valvular heart disease.  He underwent a Lexiscan nuclear stress test, which showed no evidence of ischemia, and an ejection fraction of 61%.  He wore a 24 hour Holter monitor which was normal.        In 2018, he had an echocardiogram, which showed normal left ventricular systolic function with an ejection fraction of 57% and trace tricuspid regurgitation with a right ventricular systolic pressure of 37 mmHg.  He wore a 24-hour Holter monitor, which was benign.  Vascular screening in 2018 showed a normal abdominal aorta, no peripheral arterial disease, and mild plaque without stenosis.  He was seen in the [facility] in 2018 with an episode of atrial flutter.  He had converted back to sinus rhythm when he was seen.  He was started on diltiazem.  He is not on anticoagulation.       He has overall been feeling well.  He denies any chest pain.  He has chronic shortness of breath but is able to exercise on a daily basis, either on a treadmill or an exercise bike.  He has an occasional palpitation sensation.        Review of Systems   Constitution: Negative for fever, malaise/fatigue, weight gain and weight loss.   HENT: Negative for ear pain, hearing loss, nosebleeds and sore throat.    Eyes: Positive for blurred vision. Negative for double vision,  "pain, vision loss in left eye and vision loss in right eye.   Cardiovascular: Positive for dyspnea on exertion.        See history of present illness.   Respiratory: Negative for cough, shortness of breath, sleep disturbances due to breathing, snoring and wheezing.    Endocrine: Negative for cold intolerance, heat intolerance and polyuria.   Skin: Positive for itching. Negative for poor wound healing and rash.   Musculoskeletal: Positive for joint pain. Negative for joint swelling and myalgias.   Gastrointestinal: Negative for abdominal pain, diarrhea, hematochezia, nausea and vomiting.   Genitourinary: Negative for hematuria and hesitancy.   Neurological: Negative for numbness, paresthesias and seizures.   Psychiatric/Behavioral: Negative for depression. The patient is not nervous/anxious.            ECG 12 Lead  Date/Time: 7/8/2019 11:40 AM  Performed by: Peggy Trejo MD  Authorized by: Peggy Trejo MD   Comparison: compared with previous ECG from 5/22/2018  Rhythm: sinus rhythm  Ectopy: atrial premature contractions and infrequent PVCs  BPM: 67  Conduction: conduction normal    Clinical impression: normal ECG               Objective:     /70 (BP Location: Left arm, Patient Position: Sitting, Cuff Size: Adult)   Pulse 67   Resp 16   Ht 175.3 cm (69\")   Wt 96.1 kg (211 lb 12.8 oz)   BMI 31.28 kg/m²  Body mass index is 31.28 kg/m².     Physical Exam   Constitutional: He appears well-developed.   HENT:   Head: Normocephalic and atraumatic.   Eyes: Conjunctivae and lids are normal. Pupils are equal, round, and reactive to light. Lids are everted and swept, no foreign bodies found.   Neck: Normal range of motion. No JVD present. Carotid bruit is not present. No tracheal deviation present. No thyroid mass present.   Cardiovascular: Normal rate, regular rhythm and normal heart sounds.   Pulses:       Dorsalis pedis pulses are 2+ on the right side, and 2+ on the left side.   Pulmonary/Chest: Effort " normal and breath sounds normal.   Abdominal: Normal appearance and bowel sounds are normal.   Musculoskeletal: Normal range of motion.   Neurological: He is alert. He has normal strength.   Skin: Skin is warm, dry and intact.   Psychiatric: He has a normal mood and affect. His behavior is normal.   Vitals reviewed.          Assessment/Plan:       1. Atrial Fibrillation and Atrial Flutter  Assessment  • The patient has paroxysmal atrial flutter  • The patient's CHADS2-VASc score is 3  • A [id number] score of 2 or more is considered a high risk for a thromboembolic event    Plan  • Continue diltiazem for rate control  Echocardiogram in May 2018 showed normal LV function and no significant valvular heart disease.  Holter monitor did not show recurrence of atrial flutter.    His EKG today is challenging but I see P waves though he is having premature atrial and ventricular contractions.  He is not on anticoagulation.  I am going to have him wear a Zio patch.  He would be a candidate for anticoagulation if he is having any recurrence of atrial flutter or any atrial fibrillation.     2.  COPD  3.  Hypertension    Follow-up in [place] with 1 year unless he is having problems sooner.    Orders Placed This Encounter   Procedures   • Holter Monitor - 72 Hour Up To 21 Days     Standing Status:   Future     Standing Expiration Date:   7/7/2020     Order Specific Question:   Reason for exam?     Answer:   AFib   • ECG 12 Lead     This order was created via procedure documentation        Discharge Medications           Accurate as of 7/8/19 12:11 PM. If you have any questions, ask your nurse or doctor.               Continue These Medications      Instructions Start Date   ADVAIR DISKUS 250-50 MCG/DOSE DISKUS  Generic drug:  fluticasone-salmeterol   Inhalation, 2 Times Daily - RT      atorvastatin 20 MG tablet  Commonly known as:  LIPITOR   20 mg, Oral, Daily      diltiaZEM  MG 24 hr capsule  Commonly known as:  CARDIZEM  CD   120 mg, Oral, Daily      doxazosin 2 MG tablet  Commonly known as:  CARDURA   2 mg, Oral, Nightly      furosemide 20 MG tablet  Commonly known as:  LASIX   20 mg, Oral, 2 Times Daily      losartan 100 MG tablet  Commonly known as:  COZAAR   100 mg, Oral, Daily      nystatin 384905 UNIT/ML suspension  Commonly known as:  MYCOSTATIN   No dose, route, or frequency recorded.      PROVENTIL  (90 Base) MCG/ACT inhaler  Generic drug:  albuterol sulfate HFA   Proventil  (90 Base) MCG/ACT Inhalation Aerosol Solution; Patient Sig: Proventil  (90 Base) MCG/ACT Inhalation Aerosol Solution INHALE TWO PUFFS BY MOUTH EVERY 6 HOURS AS NEEDED FOR SHORTNESS OF AIR; 60; 6; 27-Apr-2015; Active      SPIRIVA HANDIHALER 18 MCG per inhalation capsule  Generic drug:  tiotropium   INHALE ONE CAPSULE BY MOUTH DAILY      vitamin B-12 100 MCG tablet  Commonly known as:  CYANOCOBALAMIN   Oral         Stop These Medications    Co Q-10 100 MG capsule  Stopped by:  Peggy Trejo MD     hydrochlorothiazide 25 MG tablet  Commonly known as:  HYDRODIURIL  Stopped by:  Peggy Trejo MD     levoFLOXacin 500 MG tablet  Commonly known as:  LEVAQUIN  Stopped by:  MD Peggy Cox MD  07/08/19  12:11 PM

## 2019-07-22 ENCOUNTER — OFFICE VISIT (OUTPATIENT)
Dept: INTERNAL MEDICINE | Facility: CLINIC | Age: 84
End: 2019-07-22

## 2019-07-22 VITALS
DIASTOLIC BLOOD PRESSURE: 50 MMHG | BODY MASS INDEX: 31.31 KG/M2 | HEART RATE: 77 BPM | SYSTOLIC BLOOD PRESSURE: 120 MMHG | WEIGHT: 212 LBS | OXYGEN SATURATION: 94 %

## 2019-07-22 DIAGNOSIS — I48.92 PAROXYSMAL ATRIAL FLUTTER (HCC): ICD-10-CM

## 2019-07-22 DIAGNOSIS — R60.9 PERIPHERAL EDEMA: Primary | ICD-10-CM

## 2019-07-22 DIAGNOSIS — J44.9 CHRONIC OBSTRUCTIVE PULMONARY DISEASE, UNSPECIFIED COPD TYPE (HCC): ICD-10-CM

## 2019-07-22 PROCEDURE — 99214 OFFICE O/P EST MOD 30 MIN: CPT | Performed by: INTERNAL MEDICINE

## 2019-07-22 RX ORDER — HYDROCHLOROTHIAZIDE 25 MG/1
25 TABLET ORAL DAILY
COMMUNITY
End: 2019-11-01

## 2019-07-22 NOTE — PROGRESS NOTES
Chief Complaint   Patient presents with   • Leg Swelling   peripheral edema  Atrial fib  copd    History of Present Illness   Cuauhtemoc Buck is a 84 y.o. male presents for follow up evaluation on peripheral edema. He has tried lasix and hctz for swelling. Wearing compression stockings w/ some benefit. Weight has reduced 3 pounds from 7/1. BNP is wnl. He just had a heart monitor. Patient is on cardizem for atrial flutter. Bb is contraindicated related to his lung disease. Some tingling when they start to swell. Patient ate mexican food for lunch.       The following portions of the patient's history were reviewed and updated as appropriate: allergies, current medications, past family history, past medical history, past social history, past surgical history and problem list.  Current Outpatient Medications on File Prior to Visit   Medication Sig Dispense Refill   • albuterol (PROVENTIL HFA) 108 (90 BASE) MCG/ACT inhaler Proventil  (90 Base) MCG/ACT Inhalation Aerosol Solution; Patient Sig: Proventil  (90 Base) MCG/ACT Inhalation Aerosol Solution INHALE TWO PUFFS BY MOUTH EVERY 6 HOURS AS NEEDED FOR SHORTNESS OF AIR; 60; 6; 27-Apr-2015; Active     • atorvastatin (LIPITOR) 20 MG tablet Take 1 tablet by mouth Daily. 90 tablet 1   • diltiaZEM CD (CARDIZEM CD) 120 MG 24 hr capsule Take 1 capsule by mouth Daily. 90 capsule 1   • doxazosin (CARDURA) 2 MG tablet Take 1 tablet by mouth Every Night. 30 tablet 5   • fluticasone-salmeterol (ADVAIR DISKUS) 250-50 MCG/DOSE DISKUS Inhale 2 (Two) Times a Day.     • hydrochlorothiazide (HYDRODIURIL) 25 MG tablet Take 25 mg by mouth Daily.     • losartan (COZAAR) 100 MG tablet Take 1 tablet by mouth Daily. 90 tablet 3   • nystatin (MYCOSTATIN) 146297 UNIT/ML suspension      • SPIRIVA HANDIHALER 18 MCG per inhalation capsule INHALE ONE CAPSULE BY MOUTH DAILY 30 capsule 8   • vitamin B-12 (CYANOCOBALAMIN) 100 MCG tablet Take  by mouth.     • [DISCONTINUED] furosemide (LASIX)  20 MG tablet Take 1 tablet by mouth 2 (Two) Times a Day. 180 tablet 1     Current Facility-Administered Medications on File Prior to Visit   Medication Dose Route Frequency Provider Last Rate Last Dose   • levalbuterol (XOPENEX) nebulizer solution 0.63 mg  0.63 mg Nebulization Q6H PRN Padmini Jha MD   0.63 mg at 01/29/18 1333   • nitroglycerin (NITROSTAT) SL tablet 0.4 mg  0.4 mg Sublingual Q5 Min PRN Xavi East III, MD       • sodium chloride 0.9 % flush 10 mL  10 mL Intravenous PRN Xavi East III, MD         Review of Systems   Constitutional: Negative.    HENT: Negative.    Eyes: Negative.    Respiratory: Positive for shortness of breath.    Cardiovascular: Positive for leg swelling.   Gastrointestinal: Negative.    Endocrine: Negative.    Genitourinary: Negative.    Musculoskeletal: Positive for arthralgias.   Skin: Negative.    Allergic/Immunologic: Negative.    Neurological: Negative.    Hematological: Negative.    Psychiatric/Behavioral: Negative.        Objective   Physical Exam   Constitutional: He is oriented to person, place, and time. He appears well-developed and well-nourished.   HENT:   Head: Normocephalic and atraumatic.   Right Ear: External ear normal.   Left Ear: External ear normal.   Mouth/Throat: Oropharynx is clear and moist.   Eyes: EOM are normal. Pupils are equal, round, and reactive to light.   Neck: Normal range of motion. Neck supple.   Cardiovascular:   2+ pitting edema B lower extremities. Right ankle 11cm left 10 3/4.    Pulmonary/Chest: Effort normal and breath sounds normal.   Abdominal: Soft. Bowel sounds are normal.   Musculoskeletal: Normal range of motion.   Neurological: He is alert and oriented to person, place, and time.   Skin: Skin is warm and dry.   Psychiatric: He has a normal mood and affect. His behavior is normal. Judgment and thought content normal.   Nursing note and vitals reviewed.       /50   Pulse 77   Wt 96.2 kg (212 lb)   SpO2 94%   BMI  31.31 kg/m²     Assessment/Plan   Diagnoses and all orders for this visit:    Peripheral edema    Paroxysmal atrial flutter (CMS/HCC)    Chronic obstructive pulmonary disease, unspecified COPD type (CMS/HCC)    Other orders  -     hydrochlorothiazide (HYDRODIURIL) 25 MG tablet; Take 25 mg by mouth Daily.        Patient w/ peripheral edema. Multifactorial in nature. Unfortunately he needs cardizem at this time. Unable to switch to bb. If legs become painful will look for alternative. bnp wnl so will remain off lasix. He will continue hctz. given low/ normal bp will not add another diuretic. Continue compression stockings. Can get increased weight for this. He will follow up w/ cardiology routinely. He is to reduce sodium in his diet. He is to elevate his legs whenever seated. He will follow up routinely.

## 2019-07-29 ENCOUNTER — TELEPHONE (OUTPATIENT)
Dept: CARDIOLOGY | Facility: CLINIC | Age: 84
End: 2019-07-29

## 2019-07-29 NOTE — TELEPHONE ENCOUNTER
Please let him know that his monitor showed some quick bursts of a fast heart rhythm and some extra heartbeats but no atrial fibrillation or atrial flutter that would require blood thinning.  Call sooner if symptoms change

## 2019-08-26 ENCOUNTER — OFFICE VISIT (OUTPATIENT)
Dept: INTERNAL MEDICINE | Facility: CLINIC | Age: 84
End: 2019-08-26

## 2019-08-26 VITALS
HEART RATE: 77 BPM | HEIGHT: 69 IN | OXYGEN SATURATION: 97 % | TEMPERATURE: 98.2 F | WEIGHT: 211 LBS | DIASTOLIC BLOOD PRESSURE: 60 MMHG | SYSTOLIC BLOOD PRESSURE: 107 MMHG | RESPIRATION RATE: 15 BRPM | BODY MASS INDEX: 31.25 KG/M2

## 2019-08-26 DIAGNOSIS — I48.92 PAROXYSMAL ATRIAL FLUTTER (HCC): Primary | ICD-10-CM

## 2019-08-26 DIAGNOSIS — I10 BENIGN ESSENTIAL HYPERTENSION: ICD-10-CM

## 2019-08-26 PROCEDURE — 99214 OFFICE O/P EST MOD 30 MIN: CPT | Performed by: INTERNAL MEDICINE

## 2019-08-26 RX ORDER — DILTIAZEM HYDROCHLORIDE 180 MG/1
180 CAPSULE, EXTENDED RELEASE ORAL DAILY
Qty: 30 CAPSULE | Refills: 5 | Status: SHIPPED | OUTPATIENT
Start: 2019-08-26 | End: 2019-11-01

## 2019-08-26 RX ORDER — LOSARTAN POTASSIUM 50 MG/1
50 TABLET ORAL DAILY
Qty: 90 TABLET | Refills: 1 | Status: SHIPPED | OUTPATIENT
Start: 2019-08-26 | End: 2020-02-26

## 2019-08-26 RX ORDER — DILTIAZEM HYDROCHLORIDE 120 MG/1
CAPSULE, EXTENDED RELEASE ORAL
COMMUNITY
Start: 2019-08-10 | End: 2019-11-01

## 2019-08-26 NOTE — PROGRESS NOTES
"Chief Complaint   Patient presents with   • Rapid Heart Rate     not feeling well;unsure if BP is cause   • Shortness of Breath       History of Present Illness   Cuauhtemoc Buck is a 84 y.o. male presents for acute needs. Patient reports that he has noticed feeling unwell recently \"like I just cant get enough or get rid of oxygen/ exhale well\" at times. He has been checking his oxygen and pulse and found that he is tachycardic up to 144 bpm when this happens. Right now he does not have that sensation and pulse is normal.     The following portions of the patient's history were reviewed and updated as appropriate: allergies, current medications, past family history, past medical history, past social history, past surgical history and problem list.  Current Outpatient Medications on File Prior to Visit   Medication Sig Dispense Refill   • albuterol (PROVENTIL HFA) 108 (90 BASE) MCG/ACT inhaler Proventil  (90 Base) MCG/ACT Inhalation Aerosol Solution; Patient Sig: Proventil  (90 Base) MCG/ACT Inhalation Aerosol Solution INHALE TWO PUFFS BY MOUTH EVERY 6 HOURS AS NEEDED FOR SHORTNESS OF AIR; 60; 6; 27-Apr-2015; Active     • atorvastatin (LIPITOR) 20 MG tablet Take 1 tablet by mouth Daily. 90 tablet 1   • doxazosin (CARDURA) 2 MG tablet Take 1 tablet by mouth Every Night. 30 tablet 5   • fluticasone-salmeterol (ADVAIR DISKUS) 250-50 MCG/DOSE DISKUS Inhale 2 (Two) Times a Day.     • hydrochlorothiazide (HYDRODIURIL) 25 MG tablet Take 25 mg by mouth Daily.     • nystatin (MYCOSTATIN) [id number] UNIT/ML suspension      • SPIRIVA HANDIHALER 18 MCG per inhalation capsule INHALE ONE CAPSULE BY MOUTH DAILY 30 capsule 8   • vitamin B-12 (CYANOCOBALAMIN) 100 MCG tablet Take  by mouth.     • [DISCONTINUED] losartan (COZAAR) 100 MG tablet Take 1 tablet by mouth Daily. 90 tablet 3   • diltiaZEM (TIAZAC) 120 MG 24 hr capsule      • [DISCONTINUED] diltiaZEM CD (CARDIZEM CD) 120 MG 24 hr capsule Take 1 capsule by mouth Daily. " 90 capsule 1     Current Facility-Administered Medications on File Prior to Visit   Medication Dose Route Frequency Provider Last Rate Last Dose   • levalbuterol (XOPENEX) nebulizer solution 0.63 mg  0.63 mg Nebulization Q6H PRN Padmini Jha MD   0.63 mg at 01/29/18 1333   • nitroglycerin (NITROSTAT) SL tablet 0.4 mg  0.4 mg Sublingual Q5 Min PRN Xavi East III, MD       • sodium chloride 0.9 % flush 10 mL  10 mL Intravenous PRN Xavi East III, MD         Review of Systems   Constitutional: Positive for fatigue.   HENT: Negative.    Eyes: Negative.    Respiratory: Positive for shortness of breath.    Cardiovascular: Positive for palpitations.   Gastrointestinal: Negative.    Endocrine: Negative.    Genitourinary: Negative.    Musculoskeletal: Negative.    Skin: Negative.    Allergic/Immunologic: Negative.    Neurological: Negative.    Hematological: Negative.    Psychiatric/Behavioral: Negative.        Objective   Physical Exam   Constitutional: He is oriented to person, place, and time. He appears well-developed and well-nourished.   HENT:   Head: Normocephalic and atraumatic.   Right Ear: External ear normal.   Left Ear: External ear normal.   Nose: Nose normal.   Mouth/Throat: Oropharynx is clear and moist.   Eyes: Conjunctivae and EOM are normal. Pupils are equal, round, and reactive to light.   Neck: Neck supple.   Cardiovascular: Normal rate, regular rhythm and normal heart sounds.   Pulmonary/Chest: Effort normal and breath sounds normal. No respiratory distress.   Abdominal: Soft.   Musculoskeletal: Normal range of motion.   Neurological: He is alert and oriented to person, place, and time.   Skin: Skin is warm and dry.   Psychiatric: He has a normal mood and affect. His behavior is normal. Judgment and thought content normal.   Nursing note and vitals reviewed.       /62 (BP Location: Left arm, Patient Position: Sitting, Cuff Size: Adult)   Pulse 77   Temp 98.2 °F (36.8 °C) (Oral)    "Resp 15   Ht 175.3 cm (69.02\")   Wt 95.7 kg (211 lb)   SpO2 97%   BMI 31.14 kg/m²     Assessment/Plan   Diagnoses and all orders for this visit:    Paroxysmal atrial flutter (CMS/HCC)    Benign essential hypertension    Other orders  -     diltiaZEM (TIAZAC) 120 MG 24 hr capsule  -     losartan (COZAAR) 50 MG tablet; Take 1 tablet by mouth Daily.  -     diltiazem XR (DILACOR XR) 180 MG 24 hr capsule; Take 1 capsule by mouth Daily.    patient w/ symptomatic tachycardia. Will increase diltiazem to 180 mg and reduce losartan to 50 mg. He will continue to monitor how well he feels w/ corresponding pulse and bp for times when he is short of air. He will f/u routinely.              "

## 2019-08-30 RX ORDER — LOSARTAN POTASSIUM AND HYDROCHLOROTHIAZIDE 12.5; 1 MG/1; MG/1
TABLET ORAL
Qty: 90 TABLET | Refills: 0 | Status: SHIPPED | OUTPATIENT
Start: 2019-08-30 | End: 2019-11-01

## 2019-10-28 RX ORDER — HYDROCHLOROTHIAZIDE 25 MG/1
TABLET ORAL
Qty: 30 TABLET | Refills: 2 | Status: SHIPPED | OUTPATIENT
Start: 2019-10-28 | End: 2020-09-04

## 2019-11-01 ENCOUNTER — OFFICE VISIT (OUTPATIENT)
Dept: INTERNAL MEDICINE | Facility: CLINIC | Age: 84
End: 2019-11-01

## 2019-11-01 VITALS
HEART RATE: 65 BPM | WEIGHT: 203 LBS | TEMPERATURE: 98.1 F | BODY MASS INDEX: 30.07 KG/M2 | HEIGHT: 69 IN | OXYGEN SATURATION: 93 % | SYSTOLIC BLOOD PRESSURE: 110 MMHG | DIASTOLIC BLOOD PRESSURE: 64 MMHG

## 2019-11-01 DIAGNOSIS — I48.92 PAROXYSMAL ATRIAL FLUTTER (HCC): ICD-10-CM

## 2019-11-01 DIAGNOSIS — J44.9 CHRONIC OBSTRUCTIVE PULMONARY DISEASE, UNSPECIFIED COPD TYPE (HCC): Primary | ICD-10-CM

## 2019-11-01 PROCEDURE — 96372 THER/PROPH/DIAG INJ SC/IM: CPT | Performed by: INTERNAL MEDICINE

## 2019-11-01 PROCEDURE — 94640 AIRWAY INHALATION TREATMENT: CPT | Performed by: INTERNAL MEDICINE

## 2019-11-01 PROCEDURE — 99214 OFFICE O/P EST MOD 30 MIN: CPT | Performed by: INTERNAL MEDICINE

## 2019-11-01 RX ORDER — MELATONIN 10 MG
TABLET, SUBLINGUAL SUBLINGUAL
COMMUNITY
End: 2020-10-20

## 2019-11-01 RX ORDER — IPRATROPIUM BROMIDE AND ALBUTEROL SULFATE 2.5; .5 MG/3ML; MG/3ML
3 SOLUTION RESPIRATORY (INHALATION) EVERY 4 HOURS PRN
Qty: 90 VIAL | Refills: 1 | Status: SHIPPED | OUTPATIENT
Start: 2019-11-01 | End: 2021-02-26

## 2019-11-01 RX ORDER — DILTIAZEM HYDROCHLORIDE 180 MG/1
CAPSULE, EXTENDED RELEASE ORAL
COMMUNITY
Start: 2019-10-24 | End: 2020-03-16 | Stop reason: SDUPTHER

## 2019-11-01 RX ORDER — LEVOFLOXACIN 500 MG/1
500 TABLET, FILM COATED ORAL DAILY
COMMUNITY
End: 2019-12-13

## 2019-11-01 RX ORDER — METHYLPREDNISOLONE 4 MG/1
TABLET ORAL
Qty: 21 EACH | Refills: 0 | Status: SHIPPED | OUTPATIENT
Start: 2019-11-01 | End: 2019-12-13

## 2019-11-01 RX ORDER — METHYLPREDNISOLONE ACETATE 80 MG/ML
80 INJECTION, SUSPENSION INTRA-ARTICULAR; INTRALESIONAL; INTRAMUSCULAR; SOFT TISSUE ONCE
Status: COMPLETED | OUTPATIENT
Start: 2019-11-01 | End: 2019-11-01

## 2019-11-01 RX ORDER — FUROSEMIDE 20 MG/1
TABLET ORAL
COMMUNITY
Start: 2019-10-25 | End: 2019-12-13

## 2019-11-01 RX ADMIN — LEVALBUTEROL INHALATION SOLUTION 0.63 MG: 0.63 SOLUTION RESPIRATORY (INHALATION) at 15:03

## 2019-11-01 RX ADMIN — METHYLPREDNISOLONE ACETATE 80 MG: 80 INJECTION, SUSPENSION INTRA-ARTICULAR; INTRALESIONAL; INTRAMUSCULAR; SOFT TISSUE at 15:22

## 2019-11-01 NOTE — PROGRESS NOTES
"Chief Complaint   Patient presents with   • URI   • Leg Swelling     left arm    • Rash       History of Present Illness   Cuauhtemoc Buck is a 84 y.o. male presents for acute care. \"I have congestion in my chest\". He has copd and paroxysmal atrial fibrillation. He is feeling increasing shortness of air. On oxygen at 3 liters for last 10 months. Heart rate at goal. Oxygen tested at home 90-95%. He is on day 3/7 levaquin today. (\"you prescribed it about a year ago\"- bottle lists dr. berg 2/1/19). With this he has less sinus congestion but continued lung dysfunction.     The following portions of the patient's history were reviewed and updated as appropriate: allergies, current medications, past family history, past medical history, past social history, past surgical history and problem list.  Current Outpatient Medications on File Prior to Visit   Medication Sig Dispense Refill   • albuterol (PROVENTIL HFA) 108 (90 BASE) MCG/ACT inhaler Proventil  (90 Base) MCG/ACT Inhalation Aerosol Solution; Patient Sig: Proventil  (90 Base) MCG/ACT Inhalation Aerosol Solution INHALE TWO PUFFS BY MOUTH EVERY 6 HOURS AS NEEDED FOR SHORTNESS OF AIR; 60; 6; 27-Apr-2015; Active     • atorvastatin (LIPITOR) 20 MG tablet Take 1 tablet by mouth Daily. 90 tablet 1   • Cholecalciferol (VITAMIN D3) 250 MCG (30476 UT) tablet Take  by mouth.     • dilTIAZem (TIAZAC) 180 MG 24 hr capsule      • doxazosin (CARDURA) 2 MG tablet Take 1 tablet by mouth Every Night. 30 tablet 5   • fluticasone-salmeterol (ADVAIR DISKUS) 250-50 MCG/DOSE DISKUS Inhale 2 (Two) Times a Day.     • hydroCHLOROthiazide (HYDRODIURIL) 25 MG tablet TAKE ONE TABLET BY MOUTH DAILY 30 tablet 2   • levoFLOXacin (LEVAQUIN) 500 MG tablet Take 500 mg by mouth Daily.     • losartan (COZAAR) 50 MG tablet Take 1 tablet by mouth Daily. 90 tablet 1   • Misc Natural Products (JOINT SUPPORT COMPLEX PO) Take  by mouth.     • nystatin (MYCOSTATIN) [id number] UNIT/ML suspension    "   • SPIRIVA HANDIHALER 18 MCG per inhalation capsule INHALE ONE CAPSULE BY MOUTH DAILY 30 capsule 8   • vitamin B-12 (CYANOCOBALAMIN) 100 MCG tablet Take  by mouth.     • [DISCONTINUED] diltiazem XR (DILACOR XR) 180 MG 24 hr capsule Take 1 capsule by mouth Daily. 30 capsule 5   • furosemide (LASIX) 20 MG tablet      • [DISCONTINUED] diltiaZEM (TIAZAC) 120 MG 24 hr capsule      • [DISCONTINUED] hydrochlorothiazide (HYDRODIURIL) 25 MG tablet Take 25 mg by mouth Daily.     • [DISCONTINUED] losartan-hydrochlorothiazide (HYZAAR) 100-12.5 MG per tablet TAKE ONE TABLET BY MOUTH DAILY 90 tablet 0     Current Facility-Administered Medications on File Prior to Visit   Medication Dose Route Frequency Provider Last Rate Last Dose   • levalbuterol (XOPENEX) nebulizer solution 0.63 mg  0.63 mg Nebulization Q6H PRN Padmini Jha MD   0.63 mg at 01/29/18 1333   • nitroglycerin (NITROSTAT) SL tablet 0.4 mg  0.4 mg Sublingual Q5 Min PRN Xavi East III, MD       • sodium chloride 0.9 % flush 10 mL  10 mL Intravenous PRN Xavi East III, MD         Review of Systems   Constitutional: Positive for fatigue. Negative for fever.   HENT: Positive for rhinorrhea and sinus pressure.    Eyes: Negative.    Respiratory: Positive for cough, shortness of breath and wheezing.    Cardiovascular: Negative.    Gastrointestinal: Negative.    Endocrine: Negative.    Genitourinary: Negative.    Musculoskeletal: Positive for arthralgias.   Skin: Negative.    Allergic/Immunologic: Negative.    Neurological: Negative.    Hematological: Negative.    Psychiatric/Behavioral: Negative.        Objective   Physical Exam   Constitutional: He is oriented to person, place, and time. He appears well-developed and well-nourished.   Ill appearing. dyspnic   HENT:   Head: Normocephalic and atraumatic.   Right Ear: External ear normal.   Left Ear: External ear normal.   Pharyngeal erythema   Eyes: EOM are normal. Pupils are equal, round, and reactive to light.  "  Neck: Normal range of motion. Neck supple.   Cardiovascular: Normal rate, regular rhythm and normal heart sounds.   Pulmonary/Chest: He has wheezes.   Abdominal: Soft. Bowel sounds are normal.   Neurological: He is alert and oriented to person, place, and time.   Skin: Skin is warm and dry.   Psychiatric: He has a normal mood and affect. His behavior is normal. Judgment and thought content normal.   Nursing note and vitals reviewed.       /64   Pulse 65   Temp 98.1 °F (36.7 °C)   Ht 175.3 cm (69.02\")   Wt 92.1 kg (203 lb)   SpO2 93%   BMI 29.96 kg/m²     Assessment/Plan   Diagnoses and all orders for this visit:    Chronic obstructive pulmonary disease, unspecified COPD type (CMS/HCC)  -     ipratropium-albuterol (DUO-NEB) 0.5-2.5 mg/3 ml nebulizer; Take 3 mL by nebulization Every 4 (Four) Hours As Needed for Wheezing.  -     Home Nebulizer  -     Home Nebulizer Accessories    Paroxysmal atrial flutter (CMS/HCC)    Other orders  -     dilTIAZem (TIAZAC) 180 MG 24 hr capsule  -     levoFLOXacin (LEVAQUIN) 500 MG tablet; Take 500 mg by mouth Daily.  -     Cholecalciferol (VITAMIN D3) 250 MCG (40846 UT) tablet; Take  by mouth.  -     Misc Natural Products (JOINT SUPPORT COMPLEX PO); Take  by mouth.  -     furosemide (LASIX) 20 MG tablet  -     methylPREDNISolone (MEDROL, BRENDA,) 4 MG tablet; Take as directed on package instructions.    acute copd exacerbation. Patient will complete the med that he started. He had a good response to nebulizer treatment in office. Will give home device and will administer this 3-4 times daily and will f/u either here or with his pulmonologist. Medrol course. Monitor oxygen sats. May increase home o2 if needed.              "

## 2019-11-08 ENCOUNTER — OFFICE VISIT (OUTPATIENT)
Dept: INTERNAL MEDICINE | Facility: CLINIC | Age: 84
End: 2019-11-08

## 2019-11-08 VITALS
BODY MASS INDEX: 29.82 KG/M2 | SYSTOLIC BLOOD PRESSURE: 110 MMHG | DIASTOLIC BLOOD PRESSURE: 60 MMHG | WEIGHT: 202 LBS | HEART RATE: 75 BPM

## 2019-11-08 DIAGNOSIS — I10 ESSENTIAL HYPERTENSION: Primary | ICD-10-CM

## 2019-11-08 DIAGNOSIS — J44.9 CHRONIC OBSTRUCTIVE PULMONARY DISEASE, UNSPECIFIED COPD TYPE (HCC): ICD-10-CM

## 2019-11-08 PROCEDURE — 99214 OFFICE O/P EST MOD 30 MIN: CPT | Performed by: INTERNAL MEDICINE

## 2019-11-08 RX ORDER — ALBUTEROL SULFATE 90 UG/1
2 AEROSOL, METERED RESPIRATORY (INHALATION) EVERY 6 HOURS PRN
Qty: 1 INHALER | Refills: 5 | Status: SHIPPED | OUTPATIENT
Start: 2019-11-08 | End: 2021-01-28

## 2019-11-08 NOTE — PROGRESS NOTES
Chief Complaint   Patient presents with   • COPD   • Hypertension       History of Present Illness   Cuauhtemoc Buck is a 84 y.o. male presents for follow up evaluation. He was having a copd exacerbation at last visit. Started duo neb at last visit. Initially tid but now using bid. With this he is maintaining oxygen saturation at >90%. He was also rx medrol but did not take that. He did complete a levaquin course. He is taking lasix daily. Previously w/ leg swelling and this has improved. Switched losartan from 100 to 50 mg related to low bp readings. Recently increased diltiazem and hctz for swelling and symptomatic tachycardia. This has been very beneficial. He had a zio monitor that did not detect atrial fib or flutter at this time.       The following portions of the patient's history were reviewed and updated as appropriate: allergies, current medications, past family history, past medical history, past social history, past surgical history and problem list.  Current Outpatient Medications on File Prior to Visit   Medication Sig Dispense Refill   • albuterol (PROVENTIL HFA) 108 (90 BASE) MCG/ACT inhaler Proventil  (90 Base) MCG/ACT Inhalation Aerosol Solution; Patient Sig: Proventil  (90 Base) MCG/ACT Inhalation Aerosol Solution INHALE TWO PUFFS BY MOUTH EVERY 6 HOURS AS NEEDED FOR SHORTNESS OF AIR; 60; 6; 27-Apr-2015; Active     • atorvastatin (LIPITOR) 20 MG tablet Take 1 tablet by mouth Daily. 90 tablet 1   • Cholecalciferol (VITAMIN D3) 250 MCG (25116 UT) tablet Take  by mouth.     • dilTIAZem (TIAZAC) 180 MG 24 hr capsule      • doxazosin (CARDURA) 2 MG tablet Take 1 tablet by mouth Every Night. 30 tablet 5   • fluticasone-salmeterol (ADVAIR DISKUS) 250-50 MCG/DOSE DISKUS Inhale 2 (Two) Times a Day.     • furosemide (LASIX) 20 MG tablet      • hydroCHLOROthiazide (HYDRODIURIL) 25 MG tablet TAKE ONE TABLET BY MOUTH DAILY 30 tablet 2   • ipratropium-albuterol (DUO-NEB) 0.5-2.5 mg/3 ml nebulizer  Take 3 mL by nebulization Every 4 (Four) Hours As Needed for Wheezing. 90 vial 1   • levoFLOXacin (LEVAQUIN) 500 MG tablet Take 500 mg by mouth Daily.     • losartan (COZAAR) 50 MG tablet Take 1 tablet by mouth Daily. 90 tablet 1   • methylPREDNISolone (MEDROL, BRENDA,) 4 MG tablet Take as directed on package instructions. 21 each 0   • Misc Natural Products (JOINT SUPPORT COMPLEX PO) Take  by mouth.     • nystatin (MYCOSTATIN) 227964 UNIT/ML suspension      • SPIRIVA HANDIHALER 18 MCG per inhalation capsule INHALE ONE CAPSULE BY MOUTH DAILY 30 capsule 8   • vitamin B-12 (CYANOCOBALAMIN) 100 MCG tablet Take  by mouth.       Current Facility-Administered Medications on File Prior to Visit   Medication Dose Route Frequency Provider Last Rate Last Dose   • levalbuterol (XOPENEX) nebulizer solution 0.63 mg  0.63 mg Nebulization Q6H PRN Padmini Jha MD   0.63 mg at 11/01/19 1503   • nitroglycerin (NITROSTAT) SL tablet 0.4 mg  0.4 mg Sublingual Q5 Min PRN Xavi East III, MD       • sodium chloride 0.9 % flush 10 mL  10 mL Intravenous PRN Xavi East III, MD         Review of Systems   Constitutional: Negative.    HENT: Negative.    Eyes: Negative.    Respiratory: Positive for shortness of breath.         Improving. He is o2 dependent   Cardiovascular: Negative.    Gastrointestinal: Negative.    Endocrine: Negative.    Genitourinary: Negative.    Musculoskeletal: Negative.    Skin: Negative.    Allergic/Immunologic: Negative.    Neurological: Negative.    Hematological: Negative.    Psychiatric/Behavioral: Negative.        Objective   Physical Exam   Constitutional: He is oriented to person, place, and time. He appears well-developed and well-nourished.   Alert no distress   HENT:   Head: Normocephalic and atraumatic.   Right Ear: External ear normal.   Left Ear: External ear normal.   Mouth/Throat: Oropharynx is clear and moist.   Eyes: EOM are normal. Pupils are equal, round, and reactive to light.   Neck:  Normal range of motion. Neck supple.   Cardiovascular: Normal rate, regular rhythm and normal heart sounds.   Pulmonary/Chest: Breath sounds normal. No stridor. No respiratory distress. He has no wheezes. He has no rales.   Abdominal: Soft. Bowel sounds are normal.   Musculoskeletal: Normal range of motion.   Neurological: He is alert and oriented to person, place, and time.   Skin: Skin is warm and dry.   Psychiatric: He has a normal mood and affect. His behavior is normal. Judgment and thought content normal.   Nursing note and vitals reviewed.       /60   Pulse 75   Wt 91.6 kg (202 lb)   BMI 29.82 kg/m²     Assessment/Plan   Diagnoses and all orders for this visit:    Essential hypertension  -     Basic Metabolic Panel    Chronic obstructive pulmonary disease, unspecified COPD type (CMS/HCC)      Patient w/ copd and htn w/ symptomatic tachycardia and swelling in the past. He is doing well w/ current meds but may be all little bit hypovolemic. Will check bmp. Consider reduce losartan on furosemide. He will continue bid duo neb. Will give prn combivent rescue inhaler as well. He will f/u in 4 months or prn.

## 2019-12-02 RX ORDER — DOXAZOSIN 2 MG/1
TABLET ORAL
Qty: 30 TABLET | Refills: 4 | Status: SHIPPED | OUTPATIENT
Start: 2019-12-02 | End: 2020-04-28

## 2019-12-05 DIAGNOSIS — I10 BENIGN ESSENTIAL HYPERTENSION: Primary | ICD-10-CM

## 2019-12-05 DIAGNOSIS — E03.8 SUBCLINICAL HYPOTHYROIDISM: ICD-10-CM

## 2019-12-05 DIAGNOSIS — Z00.00 HEALTHCARE MAINTENANCE: ICD-10-CM

## 2019-12-05 DIAGNOSIS — E78.5 HYPERLIPIDEMIA, UNSPECIFIED HYPERLIPIDEMIA TYPE: ICD-10-CM

## 2019-12-11 LAB
ALBUMIN SERPL-MCNC: 4.2 G/DL (ref 3.5–5.2)
ALBUMIN/GLOB SERPL: 1.4 G/DL
ALP SERPL-CCNC: 53 U/L (ref 39–117)
ALT SERPL-CCNC: 24 U/L (ref 1–41)
APPEARANCE UR: CLEAR
AST SERPL-CCNC: 29 U/L (ref 1–40)
BACTERIA #/AREA URNS HPF: NORMAL /HPF
BASOPHILS # BLD AUTO: 0.03 10*3/MM3 (ref 0–0.2)
BASOPHILS NFR BLD AUTO: 0.5 % (ref 0–1.5)
BILIRUB SERPL-MCNC: 0.9 MG/DL (ref 0.2–1.2)
BILIRUB UR QL STRIP: NEGATIVE
BUN SERPL-MCNC: 15 MG/DL (ref 8–23)
BUN/CREAT SERPL: 16.5 (ref 7–25)
CALCIUM SERPL-MCNC: 9.3 MG/DL (ref 8.6–10.5)
CHLORIDE SERPL-SCNC: 102 MMOL/L (ref 98–107)
CHOLEST SERPL-MCNC: 128 MG/DL (ref 0–200)
CO2 SERPL-SCNC: 26.1 MMOL/L (ref 22–29)
COLOR UR: YELLOW
CREAT SERPL-MCNC: 0.91 MG/DL (ref 0.76–1.27)
EOSINOPHIL # BLD AUTO: 0.22 10*3/MM3 (ref 0–0.4)
EOSINOPHIL NFR BLD AUTO: 3.3 % (ref 0.3–6.2)
EPI CELLS #/AREA URNS HPF: NORMAL /HPF (ref 0–10)
ERYTHROCYTE [DISTWIDTH] IN BLOOD BY AUTOMATED COUNT: 12.4 % (ref 12.3–15.4)
GLOBULIN SER CALC-MCNC: 3.1 GM/DL
GLUCOSE SERPL-MCNC: 91 MG/DL (ref 65–99)
GLUCOSE UR QL: NEGATIVE
HCT VFR BLD AUTO: 40.2 % (ref 37.5–51)
HDLC SERPL-MCNC: 65 MG/DL (ref 40–60)
HGB BLD-MCNC: 13.8 G/DL (ref 13–17.7)
HGB UR QL STRIP: NEGATIVE
IMM GRANULOCYTES # BLD AUTO: 0.02 10*3/MM3 (ref 0–0.05)
IMM GRANULOCYTES NFR BLD AUTO: 0.3 % (ref 0–0.5)
KETONES UR QL STRIP: NEGATIVE
LDLC SERPL CALC-MCNC: 55 MG/DL (ref 0–100)
LEUKOCYTE ESTERASE UR QL STRIP: NEGATIVE
LYMPHOCYTES # BLD AUTO: 2.29 10*3/MM3 (ref 0.7–3.1)
LYMPHOCYTES NFR BLD AUTO: 34.7 % (ref 19.6–45.3)
MCH RBC QN AUTO: 34 PG (ref 26.6–33)
MCHC RBC AUTO-ENTMCNC: 34.3 G/DL (ref 31.5–35.7)
MCV RBC AUTO: 99 FL (ref 79–97)
MICRO URNS: NORMAL
MICRO URNS: NORMAL
MONOCYTES # BLD AUTO: 1.12 10*3/MM3 (ref 0.1–0.9)
MONOCYTES NFR BLD AUTO: 17 % (ref 5–12)
NEUTROPHILS # BLD AUTO: 2.92 10*3/MM3 (ref 1.7–7)
NEUTROPHILS NFR BLD AUTO: 44.2 % (ref 42.7–76)
NITRITE UR QL STRIP: NEGATIVE
NRBC BLD AUTO-RTO: 0 /100 WBC (ref 0–0.2)
PH UR STRIP: 7 [PH] (ref 5–7.5)
PLATELET # BLD AUTO: 257 10*3/MM3 (ref 140–450)
POTASSIUM SERPL-SCNC: 4.1 MMOL/L (ref 3.5–5.2)
PROT SERPL-MCNC: 7.3 G/DL (ref 6–8.5)
PROT UR QL STRIP: NEGATIVE
RBC # BLD AUTO: 4.06 10*6/MM3 (ref 4.14–5.8)
RBC #/AREA URNS HPF: NORMAL /HPF (ref 0–2)
SODIUM SERPL-SCNC: 139 MMOL/L (ref 136–145)
SP GR UR: 1.01 (ref 1–1.03)
T4 FREE SERPL-MCNC: 1.1 NG/DL (ref 0.93–1.7)
TRIGL SERPL-MCNC: 42 MG/DL (ref 0–150)
TSH SERPL DL<=0.005 MIU/L-ACNC: 5.74 UIU/ML (ref 0.27–4.2)
URINALYSIS REFLEX: NORMAL
UROBILINOGEN UR STRIP-MCNC: 0.2 MG/DL (ref 0.2–1)
VLDLC SERPL CALC-MCNC: 8.4 MG/DL
WBC # BLD AUTO: 6.6 10*3/MM3 (ref 3.4–10.8)
WBC #/AREA URNS HPF: NORMAL /HPF (ref 0–5)

## 2019-12-13 ENCOUNTER — OFFICE VISIT (OUTPATIENT)
Dept: INTERNAL MEDICINE | Facility: CLINIC | Age: 84
End: 2019-12-13

## 2019-12-13 VITALS
DIASTOLIC BLOOD PRESSURE: 56 MMHG | HEART RATE: 64 BPM | OXYGEN SATURATION: 95 % | BODY MASS INDEX: 30.51 KG/M2 | SYSTOLIC BLOOD PRESSURE: 120 MMHG | HEIGHT: 69 IN | WEIGHT: 206 LBS

## 2019-12-13 DIAGNOSIS — E78.5 HYPERLIPIDEMIA, UNSPECIFIED HYPERLIPIDEMIA TYPE: ICD-10-CM

## 2019-12-13 DIAGNOSIS — G47.33 OSA ON CPAP: ICD-10-CM

## 2019-12-13 DIAGNOSIS — E03.8 SUBCLINICAL HYPOTHYROIDISM: Primary | ICD-10-CM

## 2019-12-13 DIAGNOSIS — Z00.00 HEALTHCARE MAINTENANCE: ICD-10-CM

## 2019-12-13 DIAGNOSIS — I10 BENIGN ESSENTIAL HYPERTENSION: ICD-10-CM

## 2019-12-13 DIAGNOSIS — J44.9 CHRONIC OBSTRUCTIVE PULMONARY DISEASE, UNSPECIFIED COPD TYPE (HCC): Primary | ICD-10-CM

## 2019-12-13 DIAGNOSIS — Z99.89 OSA ON CPAP: ICD-10-CM

## 2019-12-13 DIAGNOSIS — E03.9 HYPOTHYROIDISM, UNSPECIFIED TYPE: ICD-10-CM

## 2019-12-13 PROCEDURE — 90471 IMMUNIZATION ADMIN: CPT | Performed by: INTERNAL MEDICINE

## 2019-12-13 PROCEDURE — 71046 X-RAY EXAM CHEST 2 VIEWS: CPT | Performed by: INTERNAL MEDICINE

## 2019-12-13 PROCEDURE — 96160 PT-FOCUSED HLTH RISK ASSMT: CPT | Performed by: INTERNAL MEDICINE

## 2019-12-13 PROCEDURE — 90715 TDAP VACCINE 7 YRS/> IM: CPT | Performed by: INTERNAL MEDICINE

## 2019-12-13 PROCEDURE — 99214 OFFICE O/P EST MOD 30 MIN: CPT | Performed by: INTERNAL MEDICINE

## 2019-12-13 PROCEDURE — G0439 PPPS, SUBSEQ VISIT: HCPCS | Performed by: INTERNAL MEDICINE

## 2019-12-13 RX ORDER — DILTIAZEM HYDROCHLORIDE 120 MG/1
120 CAPSULE, COATED, EXTENDED RELEASE ORAL DAILY
Qty: 30 CAPSULE | Refills: 2 | Status: SHIPPED | OUTPATIENT
Start: 2019-12-13 | End: 2020-03-16

## 2019-12-13 RX ORDER — FLUTICASONE PROPIONATE 50 MCG
2 SPRAY, SUSPENSION (ML) NASAL DAILY
COMMUNITY

## 2019-12-13 RX ORDER — LEVOTHYROXINE SODIUM 0.05 MG/1
50 TABLET ORAL DAILY
Qty: 90 TABLET | Refills: 2 | Status: SHIPPED | OUTPATIENT
Start: 2019-12-13 | End: 2020-08-28

## 2019-12-13 RX ORDER — MULTIVITAMIN WITH IRON
TABLET ORAL DAILY
COMMUNITY
End: 2021-10-12

## 2019-12-13 RX ORDER — FUROSEMIDE 40 MG/1
40 TABLET ORAL DAILY
Qty: 90 TABLET | Refills: 2 | Status: SHIPPED | OUTPATIENT
Start: 2019-12-13 | End: 2020-07-20 | Stop reason: SDUPTHER

## 2019-12-13 NOTE — PROGRESS NOTES
Subjective   AWV  Peripheral edema  Hypertension  Hyperlipidemia  COPD     Cuauhtemoc Buck is a 84 y.o. male who presents for an annual wellness visit, to address chronic issues, and to discuss acute needs. Patient has peripheral edema. He is wearing compression stockings, elevating his feet, taking furosemide, and hctz. Swelling is actually more pronounced today after starting hctz. He reports dyspnea. He is on oxygen of 3-4 liters up from 2 liters. Despite dyspnea he is exercising 5 days weekly on an eliptical machine. He is on advair and spiriva.           Review of Systems   Constitutional: Negative.    HENT: Negative.    Eyes: Negative.    Respiratory: Positive for shortness of breath.    Cardiovascular: Positive for leg swelling.   Gastrointestinal: Negative.    Endocrine: Negative.    Genitourinary: Negative.         Bladder leakage   Musculoskeletal: Positive for arthralgias.   Skin: Negative.    Allergic/Immunologic: Negative.    Neurological: Negative.    Hematological: Negative.    Psychiatric/Behavioral: Negative.        The following portions of the patient's history were reviewed and updated as appropriate: allergies, current medications, past family history, past medical history, past social history, past surgical history and problem list.     Patient Active Problem List   Diagnosis   • Benign essential hypertension   • Chronic obstructive pulmonary disease (CMS/HCC)   • Hyperlipidemia   • Shoulder joint pain   • Subclinical hypothyroidism   • Exertional dyspnea   • Hypertension   • Paroxysmal atrial flutter (CMS/HCC)       Past Medical History:   Diagnosis Date   • Abnormal CT scan    • Acute back pain    • Acute sinusitis    • Allergic conjunctivitis    • Allergic rhinitis    • Benign essential hypertension    • Bronchitis    • COPD (chronic obstructive pulmonary disease) (CMS/HCC)    • TRINH (dyspnea on exertion)    • Dyspnea    • Edema    • Emphysema lung (CMS/HCC)    • Fatigue    • Hyperlipidemia     • Hypertension    • EDILMA (obstructive sleep apnea)    • Pain in joint of left shoulder    • Palpitations    • Polyp, sigmoid colon    • Sleep apnea    • SOBOE (shortness of breath on exertion)    • Subclinical hypothyroidism    • Tachycardia     sudden   • Trigger finger        Past Surgical History:   Procedure Laterality Date   • COLONOSCOPY N/A 10/28/2016    Procedure: COLONOSCOPY INTO CECUM WITH POLYPECTOMY X 2;  Surgeon: Carli Khanna MD;  Location: [facility] ENDOSCOPY;  Service:    • CYST REMOVAL  1960       Family History   Problem Relation Age of Onset   • Heart disease Mother    • Diabetes Mother    • Hypertension Mother    • Stroke Mother    • Cancer Father    • Hypertension Father    • Heart disease Brother        Social History     Socioeconomic History   • Marital status:      Spouse name: Not on file   • Number of children: Not on file   • Years of education: Not on file   • Highest education level: Not on file   Tobacco Use   • Smoking status: Former Smoker     Packs/day: 1.50     Years: 35.00     Pack years: 52.50     Types: Cigarettes     Last attempt to quit:      Years since quittin.9   • Smokeless tobacco: Never Used   • Tobacco comment: caffeine use   Substance and Sexual Activity   • Alcohol use: Yes     Comment: 5-7 beer/liquor drinks a week   • Drug use: No   • Sexual activity: Defer       Current Outpatient Medications on File Prior to Visit   Medication Sig Dispense Refill   • albuterol (PROVENTIL HFA) 108 (90 BASE) MCG/ACT inhaler Proventil  (90 Base) MCG/ACT Inhalation Aerosol Solution; Patient Sig: Proventil  (90 Base) MCG/ACT Inhalation Aerosol Solution INHALE TWO PUFFS BY MOUTH EVERY 6 HOURS AS NEEDED FOR SHORTNESS OF AIR; 60; 6; -2015; Active     • albuterol sulfate  (90 Base) MCG/ACT inhaler Inhale 2 puffs Every 6 (Six) Hours As Needed for Wheezing. 1 inhaler 5   • atorvastatin (LIPITOR) 20 MG tablet Take 1 tablet by mouth Daily. 90  tablet 1   • Cholecalciferol (VITAMIN D3) 250 MCG (67794 UT) tablet Take  by mouth.     • dilTIAZem (TIAZAC) 180 MG 24 hr capsule      • doxazosin (CARDURA) 2 MG tablet TAKE ONE TABLET BY MOUTH EVERY NIGHT 30 tablet 4   • fluticasone (FLONASE) 50 MCG/ACT nasal spray 2 sprays into the nostril(s) as directed by provider Daily.     • hydroCHLOROthiazide (HYDRODIURIL) 25 MG tablet TAKE ONE TABLET BY MOUTH DAILY 30 tablet 2   • ipratropium-albuterol (DUO-NEB) 0.5-2.5 mg/3 ml nebulizer Take 3 mL by nebulization Every 4 (Four) Hours As Needed for Wheezing. 90 vial 1   • losartan (COZAAR) 50 MG tablet Take 1 tablet by mouth Daily. 90 tablet 1   • Magnesium 250 MG tablet Take  by mouth.     • Misc Natural Products (JOINT SUPPORT COMPLEX PO) Take  by mouth.     • nystatin (MYCOSTATIN) 136584 UNIT/ML suspension      • SPIRIVA HANDIHALER 18 MCG per inhalation capsule INHALE ONE CAPSULE BY MOUTH DAILY 30 capsule 8   • vitamin B-12 (CYANOCOBALAMIN) 100 MCG tablet Take  by mouth.     • fluticasone-salmeterol (ADVAIR DISKUS) 250-50 MCG/DOSE DISKUS Inhale 2 (Two) Times a Day.     • furosemide (LASIX) 20 MG tablet      • levoFLOXacin (LEVAQUIN) 500 MG tablet Take 500 mg by mouth Daily.     • methylPREDNISolone (MEDROL, BRENDA,) 4 MG tablet Take as directed on package instructions. 21 each 0     Current Facility-Administered Medications on File Prior to Visit   Medication Dose Route Frequency Provider Last Rate Last Dose   • levalbuterol (XOPENEX) nebulizer solution 0.63 mg  0.63 mg Nebulization Q6H PRN Padmini Jha MD   0.63 mg at 11/01/19 1503   • nitroglycerin (NITROSTAT) SL tablet 0.4 mg  0.4 mg Sublingual Q5 Min PRN Xavi East III, MD       • sodium chloride 0.9 % flush 10 mL  10 mL Intravenous PRN Xavi East III, MD           No Known Allergies    Immunization History   Administered Date(s) Administered   • Fluzone High Dose =>65 Years ([facility] ONLY) 09/15/2017, 09/24/2019   • Hepatitis A 11/09/2018   • Pneumococcal  "Conjugate 13-Valent (PCV13) 04/13/2015   • Pneumococcal Polysaccharide (PPSV23) 08/17/2007, 08/07/2017, 09/24/2019   • Shingrix 11/09/2018   • Tdap 04/17/2009   • Zostavax 04/17/2009       Objective     /56   Pulse 64   Ht 175.3 cm (69\")   Wt 93.4 kg (206 lb)   SpO2 95%   BMI 30.42 kg/m²     Physical Exam   Constitutional: He is oriented to person, place, and time. He appears well-developed and well-nourished.   O2 dependent w/ easy dyspnea   HENT:   Head: Normocephalic and atraumatic.   Right Ear: External ear normal.   Left Ear: External ear normal.   Nose: Nose normal.   Mouth/Throat: Oropharynx is clear and moist.   Hearing ipairment. Wearing aide   Eyes: Pupils are equal, round, and reactive to light. Conjunctivae and EOM are normal.   Neck: Normal range of motion. Neck supple.   Cardiovascular: Normal rate, regular rhythm, normal heart sounds and intact distal pulses.   2+ pitting edema B lower extremities.    Pulmonary/Chest: Effort normal and breath sounds normal.   Abdominal: Soft. Bowel sounds are normal.   Genitourinary:   Genitourinary Comments: Per urology   Musculoskeletal:   OA changes/ stable   Neurological: He is alert and oriented to person, place, and time.   Skin: Skin is warm and dry.   Psychiatric: He has a normal mood and affect. His behavior is normal. Judgment and thought content normal.   Nursing note and vitals reviewed.    CXR for dyspnea and increased oxygen demands. Chronic changes. No acute noted. No prior to compare.     Assessment/Plan   Cuauhtemoc was seen today for annual exam.    Diagnoses and all orders for this visit:    Chronic obstructive pulmonary disease, unspecified COPD type (CMS/HCC)  -     Ambulatory Referral to Pulmonology  -     XR Chest PA & Lateral (In Office)    EDILMA on CPAP  -     Ambulatory Referral to Pulmonology    Benign essential hypertension    Hyperlipidemia, unspecified hyperlipidemia type    Hypothyroidism, unspecified type    Healthcare " maintenance    Other orders  -     levothyroxine (SYNTHROID) 50 MCG tablet; Take 1 tablet by mouth Daily.  -     furosemide (LASIX) 40 MG tablet; Take 1 tablet by mouth Daily.  -     dilTIAZem CD (CARDIZEM CD) 120 MG 24 hr capsule; Take 1 capsule by mouth Daily.  -     Td Vaccine Greater Than or Equal To 6yo Preservative Free IM        Discussion    AWV.      Direct observation of cognitive ability was evaluated.  Patient was given health advice or handouts on the following:  nutrition, fall prevention, exercise. Patient has COPD he will be referred to pulmonology as he current md is shifiting to EDILMA care only and he is having increasing oxygen demands on current therapy. He has hypothyroidism and will start synthroid once daily. Edema is not improved on hctz and will d/c this and increase lasix to 40 mg daily. Will monitor potassium levels, compression stockings, elevate legs. Will try reducing cardizem to 120 mg related to edema and bp is low/ normal. He will receive a td vac today. F/u routinely.                 No future appointments.

## 2019-12-17 ENCOUNTER — TELEPHONE (OUTPATIENT)
Dept: INTERNAL MEDICINE | Facility: CLINIC | Age: 84
End: 2019-12-17

## 2019-12-17 NOTE — TELEPHONE ENCOUNTER
Called and spoke with patient to schedule a 1 mo lab appointment. Patient stated that he did not want to schedule appointment now. He said he will call back and schedule a lab appointment in January.     FYI- lab orders are in epic

## 2019-12-17 NOTE — TELEPHONE ENCOUNTER
----- Message from Carmen Zimmerman MA sent at 12/13/2019  3:42 PM EST -----  Orders are in please schedule   ----- Message -----  From: Padmini Jha MD  Sent: 12/13/2019   3:31 PM EST  To: EDITH Ferrellsae schedule for bmp, tsh, and free t4 in one month

## 2020-01-18 LAB
BUN SERPL-MCNC: 13 MG/DL (ref 8–23)
BUN/CREAT SERPL: 13 (ref 7–25)
CALCIUM SERPL-MCNC: 9.5 MG/DL (ref 8.6–10.5)
CHLORIDE SERPL-SCNC: 101 MMOL/L (ref 98–107)
CO2 SERPL-SCNC: 26.7 MMOL/L (ref 22–29)
CREAT SERPL-MCNC: 1 MG/DL (ref 0.76–1.27)
GLUCOSE SERPL-MCNC: 93 MG/DL (ref 65–99)
POTASSIUM SERPL-SCNC: 4.4 MMOL/L (ref 3.5–5.2)
SODIUM SERPL-SCNC: 140 MMOL/L (ref 136–145)
T4 FREE SERPL-MCNC: 1.19 NG/DL (ref 0.93–1.7)
TSH SERPL DL<=0.005 MIU/L-ACNC: 2.22 UIU/ML (ref 0.27–4.2)

## 2020-01-22 RX ORDER — FUROSEMIDE 20 MG/1
TABLET ORAL
Qty: 180 TABLET | Refills: 0 | Status: SHIPPED | OUTPATIENT
Start: 2020-01-22 | End: 2020-04-20

## 2020-01-24 ENCOUNTER — TRANSCRIBE ORDERS (OUTPATIENT)
Dept: ADMINISTRATIVE | Facility: HOSPITAL | Age: 85
End: 2020-01-24

## 2020-01-24 DIAGNOSIS — R06.00 DYSPNEA, UNSPECIFIED TYPE: ICD-10-CM

## 2020-01-24 DIAGNOSIS — J84.10 PULMONARY FIBROSIS (HCC): Primary | ICD-10-CM

## 2020-02-07 ENCOUNTER — HOSPITAL ENCOUNTER (OUTPATIENT)
Dept: NUCLEAR MEDICINE | Facility: HOSPITAL | Age: 85
Discharge: HOME | End: 2020-02-07

## 2020-02-07 ENCOUNTER — HOSPITAL ENCOUNTER (OUTPATIENT)
Dept: CT IMAGING | Facility: HOSPITAL | Age: 85
Discharge: HOME | End: 2020-02-07
Admitting: INTERNAL MEDICINE

## 2020-02-07 DIAGNOSIS — J84.10 PULMONARY FIBROSIS (HCC): ICD-10-CM

## 2020-02-07 DIAGNOSIS — R06.00 DYSPNEA, UNSPECIFIED TYPE: ICD-10-CM

## 2020-02-07 PROCEDURE — 0 TECHNETIUM ALBUMIN AGGREGATED: Performed by: INTERNAL MEDICINE

## 2020-02-07 PROCEDURE — A9558 XE133 XENON 10MCI: HCPCS | Performed by: INTERNAL MEDICINE

## 2020-02-07 PROCEDURE — 78582 LUNG VENTILAT&PERFUS IMAGING: CPT

## 2020-02-07 PROCEDURE — 0 XENON XE 133: Performed by: INTERNAL MEDICINE

## 2020-02-07 PROCEDURE — A9540 TC99M MAA: HCPCS | Performed by: INTERNAL MEDICINE

## 2020-02-07 PROCEDURE — 71250 CT THORAX DX C-: CPT

## 2020-02-07 RX ADMIN — Medication 1 DOSE: at 15:30

## 2020-02-07 RX ADMIN — XENON XE-133 7.4 MILLICURIE: 10 GAS RESPIRATORY (INHALATION) at 15:20

## 2020-02-26 RX ORDER — LOSARTAN POTASSIUM 50 MG/1
TABLET ORAL
Qty: 90 TABLET | Refills: 0 | Status: SHIPPED | OUTPATIENT
Start: 2020-02-26 | End: 2020-06-08 | Stop reason: SDUPTHER

## 2020-03-13 ENCOUNTER — TELEPHONE (OUTPATIENT)
Dept: INTERNAL MEDICINE | Facility: CLINIC | Age: 85
End: 2020-03-13

## 2020-03-13 RX ORDER — DILTIAZEM HYDROCHLORIDE 120 MG/1
CAPSULE, EXTENDED RELEASE ORAL
COMMUNITY
Start: 2020-01-16 | End: 2020-03-16 | Stop reason: SDUPTHER

## 2020-03-13 NOTE — TELEPHONE ENCOUNTER
rx sent to [facility] for sapphire trejo glucometer test strips.

## 2020-03-13 NOTE — TELEPHONE ENCOUNTER
PATIENT IS REQUESTING TEST STRIPS.   THEY ARE CALLED [facility] [employer]. HE CAN'T GET ANY FROM [employer] AT THIS TIME.   HE WENT TO [facility] THEY ARE $50.00 CASH PRICE.

## 2020-03-16 RX ORDER — ATORVASTATIN CALCIUM 20 MG/1
TABLET, FILM COATED ORAL
Qty: 30 TABLET | Refills: 3 | Status: SHIPPED | OUTPATIENT
Start: 2020-03-16 | End: 2020-06-03

## 2020-03-16 RX ORDER — DILTIAZEM HYDROCHLORIDE 120 MG/1
CAPSULE, EXTENDED RELEASE ORAL
Qty: 90 CAPSULE | Refills: 1 | Status: SHIPPED | OUTPATIENT
Start: 2020-03-16 | End: 2020-12-01 | Stop reason: DRUGHIGH

## 2020-03-31 ENCOUNTER — TELEPHONE (OUTPATIENT)
Dept: INTERNAL MEDICINE | Facility: CLINIC | Age: 85
End: 2020-03-31

## 2020-03-31 NOTE — TELEPHONE ENCOUNTER
Pt informed ----- Message from Padmini Jha MD sent at 3/31/2020 10:23 AM EDT -----  Please advise patient to reduce lipitor to 1/2 tab daily.

## 2020-04-20 RX ORDER — FUROSEMIDE 20 MG/1
TABLET ORAL
Qty: 180 TABLET | Refills: 0 | Status: SHIPPED | OUTPATIENT
Start: 2020-04-20 | End: 2021-03-05 | Stop reason: SDUPTHER

## 2020-04-28 RX ORDER — DOXAZOSIN 2 MG/1
TABLET ORAL
Qty: 90 TABLET | Refills: 1 | Status: SHIPPED | OUTPATIENT
Start: 2020-04-28 | End: 2020-11-24

## 2020-06-03 RX ORDER — ATORVASTATIN CALCIUM 20 MG/1
TABLET, FILM COATED ORAL
Qty: 90 TABLET | Refills: 0 | Status: SHIPPED | OUTPATIENT
Start: 2020-06-03 | End: 2020-09-10 | Stop reason: SDUPTHER

## 2020-06-08 RX ORDER — LOSARTAN POTASSIUM 50 MG/1
50 TABLET ORAL DAILY
Qty: 90 TABLET | Refills: 3 | Status: SHIPPED | OUTPATIENT
Start: 2020-06-08 | End: 2020-08-18 | Stop reason: SDUPTHER

## 2020-06-08 NOTE — TELEPHONE ENCOUNTER
Patient needs a refill on Advair and Spiriva. He has tried to contact his pulmonologist but has been unsuccessful and really needs these meds. Please advise.

## 2020-06-10 ENCOUNTER — TELEPHONE (OUTPATIENT)
Dept: INTERNAL MEDICINE | Facility: CLINIC | Age: 85
End: 2020-06-10

## 2020-07-20 ENCOUNTER — OFFICE VISIT (OUTPATIENT)
Dept: INTERNAL MEDICINE | Facility: CLINIC | Age: 85
End: 2020-07-20

## 2020-07-20 VITALS
HEART RATE: 70 BPM | RESPIRATION RATE: 20 BRPM | WEIGHT: 187 LBS | SYSTOLIC BLOOD PRESSURE: 116 MMHG | DIASTOLIC BLOOD PRESSURE: 60 MMHG | OXYGEN SATURATION: 92 % | BODY MASS INDEX: 27.7 KG/M2 | HEIGHT: 69 IN | TEMPERATURE: 98.7 F

## 2020-07-20 DIAGNOSIS — R60.9 EDEMA, UNSPECIFIED TYPE: ICD-10-CM

## 2020-07-20 DIAGNOSIS — I50.32 CHRONIC DIASTOLIC (CONGESTIVE) HEART FAILURE (HCC): ICD-10-CM

## 2020-07-20 DIAGNOSIS — H34.9 UNSPECIFIED RETINAL VASCULAR OCCLUSION: ICD-10-CM

## 2020-07-20 DIAGNOSIS — H53.9 VISUAL CHANGES: Primary | ICD-10-CM

## 2020-07-20 PROCEDURE — 99214 OFFICE O/P EST MOD 30 MIN: CPT | Performed by: INTERNAL MEDICINE

## 2020-07-20 NOTE — PROGRESS NOTES
Chief Complaint   Patient presents with   • Leg Swelling   • Blurred Vision       History of Present Illness   Cuauhtemoc Buck is a 85 y.o. male presents for acute care. He reports visual blurring when he is reading. Has an appointment for an eye evaluation this week. Left eye erythema after cataract procedure years ago. He has blurring but no pain in the eyes.  Patient has lower extremity swelling. He is on a ccb for atrial flutter . Echo was 2018 w/ normal lv function. Has 02 dependent copd. Notes weght loss recently. Takes furosemide 20 mg bid. He thinks he is also on hctz but is unsure.     The following portions of the patient's history were reviewed and updated as appropriate: allergies, current medications, past family history, past medical history, past social history, past surgical history and problem list.  Current Outpatient Medications on File Prior to Visit   Medication Sig Dispense Refill   • albuterol (PROVENTIL HFA) 108 (90 BASE) MCG/ACT inhaler Proventil  (90 Base) MCG/ACT Inhalation Aerosol Solution; Patient Sig: Proventil  (90 Base) MCG/ACT Inhalation Aerosol Solution INHALE TWO PUFFS BY MOUTH EVERY 6 HOURS AS NEEDED FOR SHORTNESS OF AIR; 60; 6; 27-Apr-2015; Active     • albuterol sulfate  (90 Base) MCG/ACT inhaler Inhale 2 puffs Every 6 (Six) Hours As Needed for Wheezing. 1 inhaler 5   • atorvastatin (LIPITOR) 20 MG tablet TAKE 1 TABLET EVERY DAY 90 tablet 0   • Cholecalciferol (VITAMIN D3) 250 MCG (84668 UT) tablet Take  by mouth.     • dilTIAZem (TIAZAC) 120 MG 24 hr capsule TAKE ONE CAPSULE BY MOUTH DAILY 90 capsule 1   • doxazosin (CARDURA) 2 MG tablet TAKE ONE TABLET BY MOUTH ONCE NIGHTLY 90 tablet 1   • fluticasone (FLONASE) 50 MCG/ACT nasal spray 2 sprays into the nostril(s) as directed by provider Daily.     • fluticasone-salmeterol (ADVAIR) 250-50 MCG/DOSE DISKUS Inhale 1 puff 2 (Two) Times a Day. 1 each 6   • furosemide (LASIX) 20 MG tablet TAKE ONE TABLET BY MOUTH  TWICE A  tablet 0   • hydroCHLOROthiazide (HYDRODIURIL) 25 MG tablet TAKE ONE TABLET BY MOUTH DAILY 30 tablet 2   • ipratropium-albuterol (DUO-NEB) 0.5-2.5 mg/3 ml nebulizer Take 3 mL by nebulization Every 4 (Four) Hours As Needed for Wheezing. 90 vial 1   • levothyroxine (SYNTHROID) 50 MCG tablet Take 1 tablet by mouth Daily. 90 tablet 2   • losartan (COZAAR) 50 MG tablet Take 1 tablet by mouth Daily. 90 tablet 3   • Magnesium 250 MG tablet Take  by mouth.     • Misc Natural Products (JOINT SUPPORT COMPLEX PO) Take  by mouth.     • nystatin (MYCOSTATIN) 916865 UNIT/ML suspension      • tiotropium (Spiriva HandiHaler) 18 MCG per inhalation capsule Place 1 capsule into inhaler and inhale Daily. Inhale 1 capsule by mouth daily. 30 capsule 8   • vitamin B-12 (CYANOCOBALAMIN) 100 MCG tablet Take  by mouth.     • [DISCONTINUED] furosemide (LASIX) 40 MG tablet Take 1 tablet by mouth Daily. 90 tablet 2     Current Facility-Administered Medications on File Prior to Visit   Medication Dose Route Frequency Provider Last Rate Last Dose   • levalbuterol (XOPENEX) nebulizer solution 0.63 mg  0.63 mg Nebulization Q6H PRN Padmini Jha MD   0.63 mg at 11/01/19 1503   • nitroglycerin (NITROSTAT) SL tablet 0.4 mg  0.4 mg Sublingual Q5 Min PRN Xavi East III, MD       • sodium chloride 0.9 % flush 10 mL  10 mL Intravenous PRN Xavi East III, MD         Review of Systems   Constitutional: Positive for fatigue.   HENT: Negative.    Eyes: Positive for visual disturbance.   Respiratory: Positive for shortness of breath.    Cardiovascular: Positive for palpitations and leg swelling.   Gastrointestinal: Negative.    Endocrine: Negative.    Genitourinary: Negative.    Musculoskeletal: Positive for arthralgias.   Skin: Negative.    Allergic/Immunologic: Negative.    Neurological: Negative.    Hematological: Negative.    Psychiatric/Behavioral: Negative.        Objective   Physical Exam   Constitutional: He is oriented to  "person, place, and time. He appears well-developed and well-nourished.   HENT:   Head: Normocephalic and atraumatic.   Right Ear: External ear normal.   Left Ear: External ear normal.   Mouth/Throat: Oropharynx is clear and moist.   Hearing impairment     Eyes: Pupils are equal, round, and reactive to light. EOM are normal.   Neck: Normal range of motion. Neck supple.   Cardiovascular: Normal rate and regular rhythm.   Legs 10.5\" bilateral. W/ pit edema. Stable from prior.          Pulmonary/Chest: Effort normal and breath sounds normal.   Abdominal: Soft. There is tenderness.   Musculoskeletal: Normal range of motion.   Neurological: He is alert and oriented to person, place, and time.   Skin: Skin is warm and dry.   Psychiatric: He has a normal mood and affect. His behavior is normal. Judgment and thought content normal.   Nursing note and vitals reviewed.       /60   Pulse 70   Temp 98.7 °F (37.1 °C) (Temporal)   Resp 20   Ht 175.3 cm (69\")   Wt 84.8 kg (187 lb)   SpO2 92%   BMI 27.62 kg/m²     Assessment/Plan   Diagnoses and all orders for this visit:    Visual changes  -     Basic Metabolic Panel  -     TSH  -     proBNP  -     Duplex Carotid Ultrasound CAR; Future    Edema, unspecified type  -     Basic Metabolic Panel  -     TSH  -     proBNP    Chronic diastolic (congestive) heart failure (CMS/HCC)   -     proBNP    Unspecified retinal vascular occlusion   -     Duplex Carotid Ultrasound CAR; Future        Patient w/ visual blurring. No obvious ocular changes outside of erythema left eye. He will get a carotid duplex. Test listed labs. Peripheral edema related to veinous insufficiency and also ccb. Will test for chf. Will adjust furosemide if needed. He will follow up in 3 months or prn.          "

## 2020-07-29 ENCOUNTER — HOSPITAL ENCOUNTER (OUTPATIENT)
Dept: CARDIOLOGY | Facility: HOSPITAL | Age: 85
Discharge: HOME | End: 2020-07-29
Admitting: INTERNAL MEDICINE

## 2020-07-29 DIAGNOSIS — H34.9 UNSPECIFIED RETINAL VASCULAR OCCLUSION: ICD-10-CM

## 2020-07-29 DIAGNOSIS — H53.9 VISUAL CHANGES: ICD-10-CM

## 2020-07-29 LAB
BH CV XLRA MEAS LEFT DIST CCA EDV: 11.1 CM/SEC
BH CV XLRA MEAS LEFT DIST CCA PSV: 72.1 CM/SEC
BH CV XLRA MEAS LEFT DIST ICA EDV: -14.9 CM/SEC
BH CV XLRA MEAS LEFT DIST ICA PSV: -58.5 CM/SEC
BH CV XLRA MEAS LEFT ICA/CCA RATIO: 1
BH CV XLRA MEAS LEFT MID ICA EDV: -16.1 CM/SEC
BH CV XLRA MEAS LEFT MID ICA PSV: -71.9 CM/SEC
BH CV XLRA MEAS LEFT PROX CCA EDV: 17.6 CM/SEC
BH CV XLRA MEAS LEFT PROX CCA PSV: 88.5 CM/SEC
BH CV XLRA MEAS LEFT PROX ECA EDV: -12.9 CM/SEC
BH CV XLRA MEAS LEFT PROX ECA PSV: -85 CM/SEC
BH CV XLRA MEAS LEFT PROX ICA EDV: -11.7 CM/SEC
BH CV XLRA MEAS LEFT PROX ICA PSV: -46.9 CM/SEC
BH CV XLRA MEAS LEFT PROX SCLA PSV: 118 CM/SEC
BH CV XLRA MEAS LEFT VERTEBRAL A EDV: 14.9 CM/SEC
BH CV XLRA MEAS LEFT VERTEBRAL A PSV: 64.8 CM/SEC
BH CV XLRA MEAS RIGHT DIST CCA EDV: -14.7 CM/SEC
BH CV XLRA MEAS RIGHT DIST CCA PSV: -89.7 CM/SEC
BH CV XLRA MEAS RIGHT DIST ICA EDV: -16.5 CM/SEC
BH CV XLRA MEAS RIGHT DIST ICA PSV: -73.1 CM/SEC
BH CV XLRA MEAS RIGHT ICA/CCA RATIO: 0.81
BH CV XLRA MEAS RIGHT MID ICA EDV: -19.3 CM/SEC
BH CV XLRA MEAS RIGHT MID ICA PSV: -64.8 CM/SEC
BH CV XLRA MEAS RIGHT PROX CCA EDV: 20.5 CM/SEC
BH CV XLRA MEAS RIGHT PROX CCA PSV: 110 CM/SEC
BH CV XLRA MEAS RIGHT PROX ECA EDV: -16.4 CM/SEC
BH CV XLRA MEAS RIGHT PROX ECA PSV: -116 CM/SEC
BH CV XLRA MEAS RIGHT PROX ICA EDV: -10.6 CM/SEC
BH CV XLRA MEAS RIGHT PROX ICA PSV: -65.6 CM/SEC
BH CV XLRA MEAS RIGHT PROX SCLA PSV: 80.9 CM/SEC
BH CV XLRA MEAS RIGHT VERTEBRAL A EDV: 9 CM/SEC
BH CV XLRA MEAS RIGHT VERTEBRAL A PSV: 39.3 CM/SEC
LEFT ARM BP: NORMAL MMHG
RIGHT ARM BP: NORMAL MMHG

## 2020-07-29 PROCEDURE — 93880 EXTRACRANIAL BILAT STUDY: CPT

## 2020-08-18 RX ORDER — LOSARTAN POTASSIUM 50 MG/1
50 TABLET ORAL DAILY
Qty: 90 TABLET | Refills: 3 | Status: SHIPPED | OUTPATIENT
Start: 2020-08-18 | End: 2021-06-21 | Stop reason: SDUPTHER

## 2020-08-28 RX ORDER — LEVOTHYROXINE SODIUM 0.05 MG/1
TABLET ORAL
Qty: 90 TABLET | Refills: 1 | Status: SHIPPED | OUTPATIENT
Start: 2020-08-28 | End: 2020-11-28

## 2020-09-04 RX ORDER — HYDROCHLOROTHIAZIDE 25 MG/1
TABLET ORAL
Qty: 30 TABLET | Refills: 1 | Status: SHIPPED | OUTPATIENT
Start: 2020-09-04 | End: 2020-10-20

## 2020-09-10 RX ORDER — ATORVASTATIN CALCIUM 20 MG/1
TABLET, FILM COATED ORAL
Qty: 30 TABLET | Refills: 2 | Status: SHIPPED | OUTPATIENT
Start: 2020-09-10 | End: 2021-02-04

## 2020-10-20 ENCOUNTER — OFFICE VISIT (OUTPATIENT)
Dept: INTERNAL MEDICINE | Facility: CLINIC | Age: 85
End: 2020-10-20

## 2020-10-20 VITALS
OXYGEN SATURATION: 92 % | BODY MASS INDEX: 27.17 KG/M2 | HEART RATE: 60 BPM | WEIGHT: 184 LBS | SYSTOLIC BLOOD PRESSURE: 116 MMHG | DIASTOLIC BLOOD PRESSURE: 54 MMHG

## 2020-10-20 DIAGNOSIS — E78.5 HYPERLIPIDEMIA, UNSPECIFIED HYPERLIPIDEMIA TYPE: ICD-10-CM

## 2020-10-20 DIAGNOSIS — I10 BENIGN ESSENTIAL HYPERTENSION: ICD-10-CM

## 2020-10-20 DIAGNOSIS — I48.92 PAROXYSMAL ATRIAL FLUTTER (HCC): ICD-10-CM

## 2020-10-20 DIAGNOSIS — E03.8 SUBCLINICAL HYPOTHYROIDISM: ICD-10-CM

## 2020-10-20 DIAGNOSIS — J44.9 CHRONIC OBSTRUCTIVE PULMONARY DISEASE, UNSPECIFIED COPD TYPE (HCC): Primary | ICD-10-CM

## 2020-10-20 DIAGNOSIS — Z12.5 SCREENING PSA (PROSTATE SPECIFIC ANTIGEN): ICD-10-CM

## 2020-10-20 PROCEDURE — 99214 OFFICE O/P EST MOD 30 MIN: CPT | Performed by: INTERNAL MEDICINE

## 2020-10-20 NOTE — PROGRESS NOTES
Chief Complaint   Patient presents with   • Hypertension   • Hyperlipidemia   • Hypothyroidism   • COPD       History of Present Illness   Cuauhtemoc Buck is a 85 y.o. male presents for follow up evaluation. He is grieving at this time. Wife passed away about 2 months ago. He feels some guilt at this time related to the demands of caregiving for her. He has copd. Now o2 dependent. Using all rx inhalers. He is using a home gym eliptical and treadmill as well as pushups on counter etc. Trying to increase physical activity. He has htn and hyperlipidemia. bp is normotensive. His lipids were at goal but lasted tested in dec 19. Thyroid testing at goal in January. He feels clinically euthyroid.     The following portions of the patient's history were reviewed and updated as appropriate: allergies, current medications, past family history, past medical history, past social history, past surgical history and problem list.  Current Outpatient Medications on File Prior to Visit   Medication Sig Dispense Refill   • albuterol sulfate  (90 Base) MCG/ACT inhaler Inhale 2 puffs Every 6 (Six) Hours As Needed for Wheezing. 1 inhaler 5   • atorvastatin (LIPITOR) 20 MG tablet TAKE ONE TABLET BY MOUTH DAILY 30 tablet 2   • dilTIAZem (TIAZAC) 120 MG 24 hr capsule TAKE ONE CAPSULE BY MOUTH DAILY 90 capsule 1   • doxazosin (CARDURA) 2 MG tablet TAKE ONE TABLET BY MOUTH ONCE NIGHTLY 90 tablet 1   • fluticasone (FLONASE) 50 MCG/ACT nasal spray 2 sprays into the nostril(s) as directed by provider Daily.     • furosemide (LASIX) 20 MG tablet TAKE ONE TABLET BY MOUTH TWICE A  tablet 0   • ipratropium-albuterol (DUO-NEB) 0.5-2.5 mg/3 ml nebulizer Take 3 mL by nebulization Every 4 (Four) Hours As Needed for Wheezing. 90 vial 1   • levothyroxine (SYNTHROID, LEVOTHROID) 50 MCG tablet TAKE ONE TABLET BY MOUTH DAILY 90 tablet 1   • losartan (COZAAR) 50 MG tablet Take 1 tablet by mouth Daily. 90 tablet 3   • Magnesium 250 MG tablet Take   by mouth.     • Misc Natural Products (JOINT SUPPORT COMPLEX PO) Take  by mouth.     • nystatin (MYCOSTATIN) 752538 UNIT/ML suspension Swish and swallow 5 mL 4 (Four) Times a Day. 400 mL 1   • tiotropium (Spiriva HandiHaler) 18 MCG per inhalation capsule Place 1 capsule into inhaler and inhale Daily. Inhale 1 capsule by mouth daily. 30 capsule 8   • vitamin B-12 (CYANOCOBALAMIN) 100 MCG tablet Take  by mouth.     • [DISCONTINUED] albuterol (PROVENTIL HFA) 108 (90 BASE) MCG/ACT inhaler Proventil  (90 Base) MCG/ACT Inhalation Aerosol Solution; Patient Sig: Proventil  (90 Base) MCG/ACT Inhalation Aerosol Solution INHALE TWO PUFFS BY MOUTH EVERY 6 HOURS AS NEEDED FOR SHORTNESS OF AIR; 60; 6; 27-Apr-2015; Active     • [DISCONTINUED] Cholecalciferol (VITAMIN D3) 250 MCG (28285 UT) tablet Take  by mouth.     • [DISCONTINUED] fluticasone-salmeterol (ADVAIR) 250-50 MCG/DOSE DISKUS Inhale 1 puff 2 (Two) Times a Day. 1 each 6   • [DISCONTINUED] hydroCHLOROthiazide (HYDRODIURIL) 25 MG tablet TAKE ONE TABLET BY MOUTH DAILY 30 tablet 1   • [DISCONTINUED] nystatin (MYCOSTATIN) 245393 UNIT/ML suspension Take 5 mL by mouth 4 (Four) Times a Day. 400 mL 1     Current Facility-Administered Medications on File Prior to Visit   Medication Dose Route Frequency Provider Last Rate Last Dose   • levalbuterol (XOPENEX) nebulizer solution 0.63 mg  0.63 mg Nebulization Q6H PRN Padmini Jha MD   0.63 mg at 11/01/19 1503   • nitroglycerin (NITROSTAT) SL tablet 0.4 mg  0.4 mg Sublingual Q5 Min PRN Xavi East III, MD       • sodium chloride 0.9 % flush 10 mL  10 mL Intravenous PRN Xavi East III, MD         Review of Systems   Constitutional: Negative.    HENT: Negative.    Eyes: Negative.    Respiratory: Positive for shortness of breath.    Cardiovascular: Negative.    Gastrointestinal: Negative.    Endocrine: Negative.    Genitourinary: Negative.    Musculoskeletal: Negative.    Skin: Negative.    Allergic/Immunologic:  Negative.    Neurological: Negative.    Hematological: Negative.    Psychiatric/Behavioral:        Denies feeling depressed         Objective   Physical Exam  Vitals signs and nursing note reviewed.   Constitutional:       Appearance: Normal appearance. He is well-developed and normal weight.   HENT:      Head: Normocephalic and atraumatic.      Right Ear: Tympanic membrane and external ear normal.      Left Ear: Tympanic membrane and external ear normal.      Nose: Nose normal.      Mouth/Throat:      Mouth: Mucous membranes are moist.   Eyes:      Pupils: Pupils are equal, round, and reactive to light.   Neck:      Musculoskeletal: Neck supple.   Cardiovascular:      Rate and Rhythm: Normal rate and regular rhythm.      Heart sounds: Normal heart sounds.   Pulmonary:      Effort: Pulmonary effort is normal. No respiratory distress.      Breath sounds: Normal breath sounds.   Abdominal:      General: Abdomen is flat. Bowel sounds are normal.      Palpations: Abdomen is soft.   Musculoskeletal: Normal range of motion.   Skin:     General: Skin is warm and dry.   Neurological:      Mental Status: He is alert and oriented to person, place, and time.   Psychiatric:         Mood and Affect: Mood normal.         Behavior: Behavior normal.         Thought Content: Thought content normal.         Judgment: Judgment normal.          /54   Pulse 60   Wt 83.5 kg (184 lb)   SpO2 92%   BMI 27.17 kg/m²     Assessment/Plan   Diagnoses and all orders for this visit:    Chronic obstructive pulmonary disease, unspecified COPD type (CMS/HCC)    Benign essential hypertension    Hyperlipidemia, unspecified hyperlipidemia type    Paroxysmal atrial flutter (CMS/HCC)    Subclinical hypothyroidism    patient with hypertension. Now bp low given age. Will remain on current diltiazem given atrial fibrillation. Will try reducing furosemide to 10mg given report at end of visit of blurred vision. Will test lipids and thyroid levels and  adjust medications if needed. He will f/u w/ cardiologist routinely.

## 2020-10-21 LAB
ALBUMIN SERPL-MCNC: 3.8 G/DL (ref 3.5–5.2)
ALBUMIN/GLOB SERPL: 1.3 G/DL
ALP SERPL-CCNC: 75 U/L (ref 39–117)
ALT SERPL-CCNC: 19 U/L (ref 1–41)
APPEARANCE UR: CLEAR
AST SERPL-CCNC: 24 U/L (ref 1–40)
BACTERIA #/AREA URNS HPF: NORMAL /HPF
BASOPHILS # BLD AUTO: 0.04 10*3/MM3 (ref 0–0.2)
BASOPHILS NFR BLD AUTO: 0.7 % (ref 0–1.5)
BILIRUB SERPL-MCNC: 0.7 MG/DL (ref 0–1.2)
BILIRUB UR QL STRIP: NEGATIVE
BUN SERPL-MCNC: 13 MG/DL (ref 8–23)
BUN/CREAT SERPL: 12.1 (ref 7–25)
CALCIUM SERPL-MCNC: 8.9 MG/DL (ref 8.6–10.5)
CHLORIDE SERPL-SCNC: 103 MMOL/L (ref 98–107)
CHOLEST SERPL-MCNC: 118 MG/DL (ref 0–200)
CO2 SERPL-SCNC: 27.4 MMOL/L (ref 22–29)
COLOR UR: YELLOW
CREAT SERPL-MCNC: 1.07 MG/DL (ref 0.76–1.27)
EOSINOPHIL # BLD AUTO: 0.22 10*3/MM3 (ref 0–0.4)
EOSINOPHIL NFR BLD AUTO: 3.6 % (ref 0.3–6.2)
EPI CELLS #/AREA URNS HPF: NORMAL /HPF (ref 0–10)
ERYTHROCYTE [DISTWIDTH] IN BLOOD BY AUTOMATED COUNT: 12.4 % (ref 12.3–15.4)
GLOBULIN SER CALC-MCNC: 2.9 GM/DL
GLUCOSE SERPL-MCNC: 102 MG/DL (ref 65–99)
GLUCOSE UR QL: NEGATIVE
HCT VFR BLD AUTO: 38.4 % (ref 37.5–51)
HDLC SERPL-MCNC: 70 MG/DL (ref 40–60)
HGB BLD-MCNC: 13.3 G/DL (ref 13–17.7)
HGB UR QL STRIP: NEGATIVE
IMM GRANULOCYTES # BLD AUTO: 0.02 10*3/MM3 (ref 0–0.05)
IMM GRANULOCYTES NFR BLD AUTO: 0.3 % (ref 0–0.5)
KETONES UR QL STRIP: NEGATIVE
LDLC SERPL CALC-MCNC: 37 MG/DL (ref 0–100)
LDLC/HDLC SERPL: 0.56 {RATIO}
LEUKOCYTE ESTERASE UR QL STRIP: NEGATIVE
LYMPHOCYTES # BLD AUTO: 1.35 10*3/MM3 (ref 0.7–3.1)
LYMPHOCYTES NFR BLD AUTO: 22.2 % (ref 19.6–45.3)
MCH RBC QN AUTO: 33 PG (ref 26.6–33)
MCHC RBC AUTO-ENTMCNC: 34.6 G/DL (ref 31.5–35.7)
MCV RBC AUTO: 95.3 FL (ref 79–97)
MICRO URNS: NORMAL
MICRO URNS: NORMAL
MONOCYTES # BLD AUTO: 1.05 10*3/MM3 (ref 0.1–0.9)
MONOCYTES NFR BLD AUTO: 17.2 % (ref 5–12)
MUCOUS THREADS URNS QL MICRO: PRESENT /HPF
NEUTROPHILS # BLD AUTO: 3.41 10*3/MM3 (ref 1.7–7)
NEUTROPHILS NFR BLD AUTO: 56 % (ref 42.7–76)
NITRITE UR QL STRIP: NEGATIVE
NRBC BLD AUTO-RTO: 0 /100 WBC (ref 0–0.2)
PH UR STRIP: 7.5 [PH] (ref 5–7.5)
PLATELET # BLD AUTO: 278 10*3/MM3 (ref 140–450)
POTASSIUM SERPL-SCNC: 4.3 MMOL/L (ref 3.5–5.2)
PROT SERPL-MCNC: 6.7 G/DL (ref 6–8.5)
PROT UR QL STRIP: NEGATIVE
PSA SERPL-MCNC: 0.63 NG/ML (ref 0–4)
RBC # BLD AUTO: 4.03 10*6/MM3 (ref 4.14–5.8)
RBC #/AREA URNS HPF: NORMAL /HPF (ref 0–2)
SODIUM SERPL-SCNC: 138 MMOL/L (ref 136–145)
SP GR UR: 1.01 (ref 1–1.03)
TRIGL SERPL-MCNC: 43 MG/DL (ref 0–150)
TSH SERPL DL<=0.005 MIU/L-ACNC: 2.66 UIU/ML (ref 0.27–4.2)
URINALYSIS REFLEX: NORMAL
UROBILINOGEN UR STRIP-MCNC: 1 MG/DL (ref 0.2–1)
VLDLC SERPL CALC-MCNC: 11 MG/DL (ref 5–40)
WBC # BLD AUTO: 6.09 10*3/MM3 (ref 3.4–10.8)
WBC #/AREA URNS HPF: NORMAL /HPF (ref 0–5)

## 2020-10-29 ENCOUNTER — OFFICE VISIT (OUTPATIENT)
Dept: CARDIOLOGY | Facility: CLINIC | Age: 85
End: 2020-10-29

## 2020-10-29 VITALS
WEIGHT: 193 LBS | DIASTOLIC BLOOD PRESSURE: 58 MMHG | BODY MASS INDEX: 28.58 KG/M2 | SYSTOLIC BLOOD PRESSURE: 126 MMHG | HEIGHT: 69 IN | HEART RATE: 58 BPM

## 2020-10-29 DIAGNOSIS — I48.92 PAROXYSMAL ATRIAL FLUTTER (HCC): Primary | ICD-10-CM

## 2020-10-29 DIAGNOSIS — J44.9 CHRONIC OBSTRUCTIVE PULMONARY DISEASE, UNSPECIFIED COPD TYPE (HCC): ICD-10-CM

## 2020-10-29 PROCEDURE — 93000 ELECTROCARDIOGRAM COMPLETE: CPT | Performed by: NURSE PRACTITIONER

## 2020-10-29 PROCEDURE — 99214 OFFICE O/P EST MOD 30 MIN: CPT | Performed by: NURSE PRACTITIONER

## 2020-10-29 NOTE — PROGRESS NOTES
Date of Office Visit: 10/29/20  Encounter Provider: ADAN Quick  Place of Service: [facility] CARDIOLOGY  Patient Name: Cuauhtemoc Buck  :1935    Chief Complaint   Patient presents with   • Atrial Fibrillation   • Hypertension   • Follow-up   :     HPI: Cuauhtemoc Buck is a 85 y.o. male  with history of hypertension, hyperlipidemia, paroxysmal atrial flutter/fibrillation, COPD and emphysema.  He is followed by Dr. Trejo.  I will visit with him for the first time today and reviewed his medical record.    He was referred by Dr. Padmini Jha for dyspnea.  He had an episode of tachycardia and in May 2013 had an echo which showed normal left ventricular systolic function with an ejection fraction of 66%, normal diastolic function no significant valvular disease.  He had a Lexiscan perfusion stress test which showed no evidence of ischemia.  He wore 24-hour Holter monitor which was normal.  In May 2018, he had an echocardiogram which showed normal left ventricular systolic function, trace tricuspid regurgitation, RVSP 37 mmHg.  He had a benign 24-hour Holter monitor.  Vascular screening showed normal abdominal aorta, no peripheral arterial disease and mild plaque without stenosis in the carotid arteries.  He was seen in the cardiac evaluation clinic May 2018 with an episode of atrial flutter.  He converted back to normal sinus rhythm spontaneously and was started on diltiazem.  He was not anticoagulated.  He wore a 2-week mobile telemetry device 2019 which showed predominant rhythm sinus with first-degree AV block.  There were frequent short burst of SVT showing sinus tachycardia at 110 bpm.  There were isolated ventricular ectopics 1.6% of the time.  There was no atrial fibrillation or flutter.    We visit today for reassessment.  6 months ago he began having increased blurred vision.  He had a new pair of glasses 1 month ago but needed his linens changed out again yesterday.   "He only feels a more prominent heartbeat while he is exercising otherwise no real palpitations such as racing or skipping heartbeat sensation.  He has baseline shortness of breath uses anywhere from 2 to 5 to 4 L of oxygen.  He has a follow-up with his pulmonologist in January.  He has no chest pain tightness pressure, lightheadedness, or fatigue.  He has a gym in his basement.  He walks the elliptical anywhere from 3 to 10 minutes.  Sometimes his oxygen goes down to 85 with that.  He also does some lifting approximately 10 pounds with 5 repetitions.      No Known Allergies    Past Medical History:   Diagnosis Date   • Abnormal CT scan    • Acute back pain    • Acute sinusitis    • Allergic conjunctivitis    • Allergic rhinitis    • Benign essential hypertension    • Bronchitis    • COPD (chronic obstructive pulmonary disease) (CMS/HCC)    • TRINH (dyspnea on exertion)    • Dyspnea    • Edema    • Emphysema lung (CMS/HCC)    • Fatigue    • Hyperlipidemia    • Hypertension    • EDILMA (obstructive sleep apnea)    • Pain in joint of left shoulder    • Palpitations    • Polyp, sigmoid colon    • Sleep apnea    • SOBOE (shortness of breath on exertion)    • Subclinical hypothyroidism    • Tachycardia     sudden   • Trigger finger        Past Surgical History:   Procedure Laterality Date   • COLONOSCOPY N/A 10/28/2016    Procedure: COLONOSCOPY INTO CECUM WITH POLYPECTOMY X 2;  Surgeon: Carli Khanna MD;  Location: [facility] ENDOSCOPY;  Service:    • CYST REMOVAL  1960         Family and social history reviewed.     ROS  All other systems were reviewed and are negative          Objective:     Vitals:    10/29/20 1310   BP: 126/58   BP Location: Left arm   Patient Position: Sitting   Pulse: 58   Weight: 87.5 kg (193 lb)   Height: 175.3 cm (69\")     Body mass index is 28.5 kg/m².    PHYSICAL EXAM:  Constitutional:       General: Not in acute distress.     Appearance: Well-developed. Not diaphoretic.   Eyes:      " Conjunctiva/sclera: Conjunctivae normal.   HENT:      Head: Normocephalic.   Neck:      Musculoskeletal: Normal range of motion.      Vascular: No JVD.   Pulmonary:      Effort: Pulmonary effort is normal. No respiratory distress.      Breath sounds: Examination of the right-upper field reveals wheezing. Decreased breath sounds present. Wheezing present. No rhonchi. No rales.   Chest:      Chest wall: Not tender to palpatation.   Cardiovascular:      Normal rate. Regular rhythm.   Abdominal:      General: Bowel sounds are normal. There is no distension.      Palpations: Abdomen is soft.   Musculoskeletal: Normal range of motion.   Skin:     General: Skin is warm and dry. There is no cyanosis.      Findings: No rash.   Neurological:      Mental Status: Alert and oriented to person, place, and time.   Psychiatric:         Behavior: Behavior normal.         Thought Content: Thought content normal.         Judgment: Judgment normal.           ECG 12 Lead    Date/Time: 10/29/2020 1:27 PM  Performed by: Brianne Peña APRN  Authorized by: Brianne Peña APRN   Comparison: compared with previous ECG   Similar to previous ECG  Rhythm: sinus rhythm  Ectopy: atrial premature contractions  Rate: normal  T flattening: III  Other findings: non-specific ST-T wave changes    Clinical impression: non-specific ECG            Current Outpatient Medications   Medication Sig Dispense Refill   • albuterol sulfate  (90 Base) MCG/ACT inhaler Inhale 2 puffs Every 6 (Six) Hours As Needed for Wheezing. 1 inhaler 5   • atorvastatin (LIPITOR) 20 MG tablet TAKE ONE TABLET BY MOUTH DAILY 30 tablet 2   • dilTIAZem (TIAZAC) 120 MG 24 hr capsule TAKE ONE CAPSULE BY MOUTH DAILY 90 capsule 1   • doxazosin (CARDURA) 2 MG tablet TAKE ONE TABLET BY MOUTH ONCE NIGHTLY 90 tablet 1   • fluticasone (FLONASE) 50 MCG/ACT nasal spray 2 sprays into the nostril(s) as directed by provider Daily.     • Fluticasone-Salmeterol (ADVAIR DISKUS IN) Inhale.     •  furosemide (LASIX) 20 MG tablet TAKE ONE TABLET BY MOUTH TWICE A  tablet 0   • ipratropium-albuterol (DUO-NEB) 0.5-2.5 mg/3 ml nebulizer Take 3 mL by nebulization Every 4 (Four) Hours As Needed for Wheezing. 90 vial 1   • levothyroxine (SYNTHROID, LEVOTHROID) 50 MCG tablet TAKE ONE TABLET BY MOUTH DAILY 90 tablet 1   • losartan (COZAAR) 50 MG tablet Take 1 tablet by mouth Daily. 90 tablet 3   • Magnesium 250 MG tablet Take  by mouth.     • Misc Natural Products (JOINT SUPPORT COMPLEX PO) Take  by mouth.     • nystatin (MYCOSTATIN) 355638 UNIT/ML suspension Swish and swallow 5 mL 4 (Four) Times a Day. 400 mL 1   • tiotropium (Spiriva HandiHaler) 18 MCG per inhalation capsule Place 1 capsule into inhaler and inhale Daily. Inhale 1 capsule by mouth daily. 30 capsule 8   • vitamin B-12 (CYANOCOBALAMIN) 100 MCG tablet Take  by mouth.       Current Facility-Administered Medications   Medication Dose Route Frequency Provider Last Rate Last Dose   • levalbuterol (XOPENEX) nebulizer solution 0.63 mg  0.63 mg Nebulization Q6H PRN Padmini Jha MD   0.63 mg at 11/01/19 1503     Assessment:       Diagnosis Plan   1. Paroxysmal atrial flutter (CMS/HCC)  ECG 12 Lead   2. Chronic obstructive pulmonary disease, unspecified COPD type (CMS/HCC)          Orders Placed This Encounter   Procedures   • ECG 12 Lead     This order was created via procedure documentation         Plan:       1.  85-year-old gentleman with history of atrial flutter May 2018 maintained on diltiazem.  Last 2-week mobile telemetry July 2019 without recurrence.  He is not anticoagulated maintained on diltiazem. No real palpitations   2.  Hypertension blood pressure appears well controlled  3.  Hyperlipidemia on atorvastatin 20 mg  4.  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  He follows with Dr. Hale and has follow up approx 01/2021   5.  Hypothyroidism on replacement therapy  6.  Blurred  vision started 6 months ago he is following with his eye doctor very closely and is now on his second set of Lens in the last 6 weeks.  7.  Carotid artery plaque in the left no stenosis on duplex July 2020          It has been a pleasure to participate in this patient's care.      Thank you,  ADAN Quick      **I used Dragon to dictate this note:**

## 2020-11-16 ENCOUNTER — TELEPHONE (OUTPATIENT)
Dept: INTERNAL MEDICINE | Facility: CLINIC | Age: 85
End: 2020-11-16

## 2020-11-24 RX ORDER — DOXAZOSIN 2 MG/1
TABLET ORAL
Qty: 30 TABLET | Refills: 0 | Status: SHIPPED | OUTPATIENT
Start: 2020-11-24 | End: 2020-11-28

## 2020-11-28 RX ORDER — DOXAZOSIN 2 MG/1
TABLET ORAL
Qty: 90 TABLET | Refills: 1 | Status: SHIPPED | OUTPATIENT
Start: 2020-11-28 | End: 2021-06-09

## 2020-11-28 RX ORDER — LEVOTHYROXINE SODIUM 0.05 MG/1
TABLET ORAL
Qty: 90 TABLET | Refills: 0 | Status: SHIPPED | OUTPATIENT
Start: 2020-11-28 | End: 2021-06-21 | Stop reason: SDUPTHER

## 2020-11-30 ENCOUNTER — TELEPHONE (OUTPATIENT)
Dept: INTERNAL MEDICINE | Facility: CLINIC | Age: 85
End: 2020-11-30

## 2020-11-30 NOTE — TELEPHONE ENCOUNTER
PATIENT WOULD LIKE SOMEONE TO CALL HIM TO DISCUSS SIDE EFFECTS OF doxazosin (CARDURA) 2 MG tablet. PATIENT THINKS THAT THIS MEDICATION IS CAUSING HIS HEART RATE TO DROP ALL OF A SUDDEN. PLEASE ADVISE.     PATIENT CALL BACK: [phone]

## 2020-11-30 NOTE — TELEPHONE ENCOUNTER
Spoke with dr keane and she instructed to stop or cut in half the Cardura. And the schedule pt tomorrow for ov. If pt is fainting schedule today    Spoke with pt he states he is not but pulse is low  Instructed to stop the Cardura and made appt for Tuesday

## 2020-12-01 ENCOUNTER — OFFICE VISIT (OUTPATIENT)
Dept: INTERNAL MEDICINE | Facility: CLINIC | Age: 85
End: 2020-12-01

## 2020-12-01 VITALS
WEIGHT: 190 LBS | DIASTOLIC BLOOD PRESSURE: 54 MMHG | SYSTOLIC BLOOD PRESSURE: 116 MMHG | BODY MASS INDEX: 28.06 KG/M2 | HEART RATE: 63 BPM

## 2020-12-01 DIAGNOSIS — R00.1 BRADYCARDIA: ICD-10-CM

## 2020-12-01 DIAGNOSIS — I50.9 CONGESTIVE HEART FAILURE, UNSPECIFIED HF CHRONICITY, UNSPECIFIED HEART FAILURE TYPE (HCC): Primary | ICD-10-CM

## 2020-12-01 DIAGNOSIS — J44.9 CHRONIC OBSTRUCTIVE PULMONARY DISEASE, UNSPECIFIED COPD TYPE (HCC): ICD-10-CM

## 2020-12-01 DIAGNOSIS — R06.00 DYSPNEA, UNSPECIFIED TYPE: ICD-10-CM

## 2020-12-01 PROCEDURE — 71046 X-RAY EXAM CHEST 2 VIEWS: CPT | Performed by: INTERNAL MEDICINE

## 2020-12-01 PROCEDURE — 99214 OFFICE O/P EST MOD 30 MIN: CPT | Performed by: INTERNAL MEDICINE

## 2020-12-01 NOTE — PROGRESS NOTES
"Chief Complaint   Patient presents with   • Irregular Heart Beat   • other     trouble exhaling        History of Present Illness   Cuauhtemoc Buck is a 85 y.o. male presents for acute care. Patient reports that he is feeling like he is \"smothering\" when he lies down at night. Tracks his oxygen level at home and this runs >90% \"as a rule\". However, he notes that his machine and his fit bit are tracking his pulse in \"the 30's\". When that happens he gets short winded but not really dizzy. He does note blurred vision.    He is taking diltiazem daily. He is on this for atrial flutter.   Had lasix reduced at last visit after describing light headed w blurred vision.  He is tolerating 30 min on gazelle daily.     The following portions of the patient's history were reviewed and updated as appropriate: allergies, current medications, past family history, past medical history, past social history, past surgical history and problem list.  Current Outpatient Medications on File Prior to Visit   Medication Sig Dispense Refill   • albuterol sulfate  (90 Base) MCG/ACT inhaler Inhale 2 puffs Every 6 (Six) Hours As Needed for Wheezing. 1 inhaler 5   • atorvastatin (LIPITOR) 20 MG tablet TAKE ONE TABLET BY MOUTH DAILY 30 tablet 2   • dilTIAZem (TIAZAC) 120 MG 24 hr capsule TAKE ONE CAPSULE BY MOUTH DAILY 90 capsule 1   • Fluticasone-Salmeterol (ADVAIR DISKUS IN) Inhale.     • furosemide (LASIX) 20 MG tablet TAKE ONE TABLET BY MOUTH TWICE A  tablet 0   • ipratropium-albuterol (DUO-NEB) 0.5-2.5 mg/3 ml nebulizer Take 3 mL by nebulization Every 4 (Four) Hours As Needed for Wheezing. 90 vial 1   • levothyroxine (SYNTHROID, LEVOTHROID) 50 MCG tablet TAKE ONE TABLET BY MOUTH DAILY 90 tablet 0   • losartan (COZAAR) 50 MG tablet Take 1 tablet by mouth Daily. 90 tablet 3   • Magnesium 250 MG tablet Take  by mouth.     • Misc Natural Products (JOINT SUPPORT COMPLEX PO) Take  by mouth.     • nystatin (MYCOSTATIN) 572802 UNIT/ML " suspension Swish and swallow 5 mL 4 (Four) Times a Day. 400 mL 1   • tiotropium (Spiriva HandiHaler) 18 MCG per inhalation capsule Place 1 capsule into inhaler and inhale Daily. Inhale 1 capsule by mouth daily. 30 capsule 8   • vitamin B-12 (CYANOCOBALAMIN) 100 MCG tablet Take  by mouth.     • doxazosin (CARDURA) 2 MG tablet TAKE ONE TABLET BY MOUTH ONCE NIGHTLY 90 tablet 1   • fluticasone (FLONASE) 50 MCG/ACT nasal spray 2 sprays into the nostril(s) as directed by provider Daily.       Current Facility-Administered Medications on File Prior to Visit   Medication Dose Route Frequency Provider Last Rate Last Admin   • levalbuterol (XOPENEX) nebulizer solution 0.63 mg  0.63 mg Nebulization Q6H PRN Padmini Jha MD   0.63 mg at 11/01/19 1503     Review of Systems   Constitutional: Positive for fatigue.   HENT: Negative.    Eyes: Positive for visual disturbance.        Blurred vision   Respiratory: Positive for shortness of breath. Negative for cough.    Cardiovascular: Positive for leg swelling.   Gastrointestinal: Negative.    Endocrine: Negative.    Genitourinary: Negative.    Musculoskeletal: Negative.    Skin: Negative.    Allergic/Immunologic: Negative.    Neurological: Positive for dizziness.   Hematological: Negative.        Objective   Physical Exam  Vitals signs and nursing note reviewed.   Constitutional:       Appearance: Normal appearance.   HENT:      Head: Normocephalic and atraumatic.      Right Ear: Tympanic membrane normal.      Left Ear: Tympanic membrane normal.      Nose: Nose normal.      Mouth/Throat:      Mouth: Mucous membranes are moist.   Eyes:      Extraocular Movements: Extraocular movements intact.      Pupils: Pupils are equal, round, and reactive to light.   Neck:      Musculoskeletal: Normal range of motion and neck supple.   Cardiovascular:      Rate and Rhythm: Normal rate.      Pulses: Normal pulses.      Heart sounds: Normal heart sounds.   Pulmonary:      Comments: o2 dependent.  Some dyspnea at rest but did not worsen w/ lying down flat  Abdominal:      General: Abdomen is flat. Bowel sounds are normal.      Palpations: Abdomen is soft.   Musculoskeletal: Normal range of motion.   Skin:     General: Skin is warm and dry.   Neurological:      General: No focal deficit present.      Mental Status: He is alert and oriented to person, place, and time.   Psychiatric:         Mood and Affect: Mood normal.         Behavior: Behavior normal.         Thought Content: Thought content normal.         Judgment: Judgment normal.        cxr for dyspnea/ chf. Increase opacity at bases/ edema.     /54   Pulse 63   Wt 86.2 kg (190 lb)   BMI 28.06 kg/m²     Assessment/Plan   Diagnoses and all orders for this visit:    Congestive heart failure, unspecified HF chronicity, unspecified heart failure type (CMS/HCC)  -     Basic Metabolic Panel  -     proBNP  -     XR Chest PA & Lateral (In Office)    Dyspnea, unspecified type  -     Basic Metabolic Panel  -     proBNP    Chronic obstructive pulmonary disease, unspecified COPD type (CMS/HCC)    Bradycardia  -     Holter Monitor - 48 Hour; Future      Patient w/ dyspnea when lying down. Will increase furosemide to full tablet. Test listed labs. cxr suggestive of heart failure. Will send for stat overread and treat if pneumonia suspected. Reduce diltiazem to 30 mg bid. Patient will call w/ pulse and symptoms on Friday.

## 2020-12-02 LAB
BUN SERPL-MCNC: 11 MG/DL (ref 8–23)
BUN/CREAT SERPL: 13.1 (ref 7–25)
CALCIUM SERPL-MCNC: 8.9 MG/DL (ref 8.6–10.5)
CHLORIDE SERPL-SCNC: 102 MMOL/L (ref 98–107)
CO2 SERPL-SCNC: 24.5 MMOL/L (ref 22–29)
CREAT SERPL-MCNC: 0.84 MG/DL (ref 0.76–1.27)
GLUCOSE SERPL-MCNC: 93 MG/DL (ref 65–99)
NT-PROBNP SERPL-MCNC: 541 PG/ML (ref 0–486)
POTASSIUM SERPL-SCNC: 4 MMOL/L (ref 3.5–5.2)
SODIUM SERPL-SCNC: 138 MMOL/L (ref 136–145)

## 2020-12-14 ENCOUNTER — OFFICE VISIT (OUTPATIENT)
Dept: INTERNAL MEDICINE | Facility: CLINIC | Age: 85
End: 2020-12-14

## 2020-12-14 VITALS
WEIGHT: 184 LBS | HEART RATE: 73 BPM | SYSTOLIC BLOOD PRESSURE: 118 MMHG | OXYGEN SATURATION: 94 % | HEIGHT: 69 IN | BODY MASS INDEX: 27.25 KG/M2 | DIASTOLIC BLOOD PRESSURE: 58 MMHG | TEMPERATURE: 97.3 F

## 2020-12-14 DIAGNOSIS — E03.9 HYPOTHYROIDISM, UNSPECIFIED TYPE: ICD-10-CM

## 2020-12-14 DIAGNOSIS — Z00.00 HEALTHCARE MAINTENANCE: Primary | ICD-10-CM

## 2020-12-14 DIAGNOSIS — I10 BENIGN ESSENTIAL HYPERTENSION: ICD-10-CM

## 2020-12-14 DIAGNOSIS — J44.9 CHRONIC OBSTRUCTIVE PULMONARY DISEASE, UNSPECIFIED COPD TYPE (HCC): ICD-10-CM

## 2020-12-14 DIAGNOSIS — I50.9 CHRONIC CONGESTIVE HEART FAILURE, UNSPECIFIED HEART FAILURE TYPE (HCC): ICD-10-CM

## 2020-12-14 PROCEDURE — 99397 PER PM REEVAL EST PAT 65+ YR: CPT | Performed by: INTERNAL MEDICINE

## 2020-12-14 PROCEDURE — 99214 OFFICE O/P EST MOD 30 MIN: CPT | Performed by: INTERNAL MEDICINE

## 2020-12-14 NOTE — PROGRESS NOTES
The ABCs of the Annual Wellness Visit  Subsequent Medicare Wellness Visit    Chief Complaint   Patient presents with   • Annual Exam   • Hypertension   • COPD   • Sleep Apnea       Subjective   History of Present Illness:  Cuauhtemoc Buck is a 85 y.o. male who presents for a Subsequent Medicare Wellness Visit. He reports doing fairly well. Has EDILMA. Using CPAP about 3-4 nights out of the week. He is wearing oxygen 24 hours daily at 3 liters. bp has been normotensive. Has svt. Recently pulse was low. Reduced cardizem and increased furosemide. He notes that in the last week his breathing has improved substantially. Visual blurring only occurs when focusing on reading for a prolonged time. He does follow w/ eye, cardiac, and pulmonary specialist. He has pulmonary fibrosis and most recent cxr supported this.   Some foot and leg itching. Has known onychomycosis.         HEALTH RISK ASSESSMENT    Recent Hospitalizations:  No hospitalization(s) within the last year.    Current Medical Providers:  Patient Care Team:  Padmini Jha MD as PCP - General (Internal Medicine)  Peggy Trejo MD as Consulting Physician (Cardiology)    Smoking Status:  Social History     Tobacco Use   Smoking Status Former Smoker   • Packs/day: 1.50   • Years: 35.00   • Pack years: 52.50   • Types: Cigarettes   • Quit date: 2005   • Years since quitting: 15.9   Smokeless Tobacco Never Used   Tobacco Comment    caffeine use       Alcohol Consumption:  Social History     Substance and Sexual Activity   Alcohol Use Yes   • Binge frequency: Weekly    Comment: 5-7 beer/liquor drinks a week       Depression Screen:   PHQ-2/PHQ-9 Depression Screening 12/14/2020   Little interest or pleasure in doing things 0   Feeling down, depressed, or hopeless 0   Total Score 0       Fall Risk Screen:  STEADI Fall Risk Assessment was completed, and patient is at LOW risk for falls.Assessment completed on:12/14/2020    Health Habits and Functional and Cognitive  Screening:  Functional & Cognitive Status 12/14/2020   Do you have difficulty preparing food and eating? No   Do you have difficulty bathing yourself, getting dressed or grooming yourself? No   Do you have difficulty using the toilet? No   Do you have difficulty moving around from place to place? No   Do you have trouble with steps or getting out of a bed or a chair? No   Current Diet Well Balanced Diet   Dental Exam Up to date   Eye Exam Up to date   Exercise (times per week) 4 times per week   Current Exercise Activities Include Walking   Do you need help using the phone?  No   Are you deaf or do you have serious difficulty hearing?  Yes   Do you need help with transportation? No   Do you need help shopping? No   Do you need help preparing meals?  No   Do you need help with housework?  No   Do you need help with laundry? No   Do you need help taking your medications? No   Do you need help managing money? No   Do you ever drive or ride in a car without wearing a seat belt? No   Have you felt unusual stress, anger or loneliness in the last month? No   Who do you live with? Alone   If you need help, do you have trouble finding someone available to you? No   Have you been bothered in the last four weeks by sexual problems? No   Do you have difficulty concentrating, remembering or making decisions? No         Does the patient have evidence of cognitive impairment? No    Asprin use counseling:Does not need ASA (and currently is not on it)    Age-appropriate Screening Schedule:  Refer to the list below for future screening recommendations based on patient's age, sex and/or medical conditions. Orders for these recommended tests are listed in the plan section. The patient has been provided with a written plan.    Health Maintenance   Topic Date Due   • LIPID PANEL  10/20/2021   • TDAP/TD VACCINES (4 - Td) 12/13/2029   • INFLUENZA VACCINE  Completed   • ZOSTER VACCINE  Completed          The following portions of the  patient's history were reviewed and updated as appropriate: allergies, current medications, past family history, past medical history, past social history, past surgical history and problem list.    Outpatient Medications Prior to Visit   Medication Sig Dispense Refill   • albuterol sulfate  (90 Base) MCG/ACT inhaler Inhale 2 puffs Every 6 (Six) Hours As Needed for Wheezing. 1 inhaler 5   • atorvastatin (LIPITOR) 20 MG tablet TAKE ONE TABLET BY MOUTH DAILY 30 tablet 2   • dilTIAZem (Cardizem) 30 MG tablet Take 1 tablet by mouth 2 (two) times a day. 180 tablet 1   • doxazosin (CARDURA) 2 MG tablet TAKE ONE TABLET BY MOUTH ONCE NIGHTLY 90 tablet 1   • fluticasone (FLONASE) 50 MCG/ACT nasal spray 2 sprays into the nostril(s) as directed by provider Daily.     • Fluticasone-Salmeterol (ADVAIR DISKUS IN) Inhale.     • furosemide (LASIX) 20 MG tablet TAKE ONE TABLET BY MOUTH TWICE A  tablet 0   • ipratropium-albuterol (DUO-NEB) 0.5-2.5 mg/3 ml nebulizer Take 3 mL by nebulization Every 4 (Four) Hours As Needed for Wheezing. 90 vial 1   • levothyroxine (SYNTHROID, LEVOTHROID) 50 MCG tablet TAKE ONE TABLET BY MOUTH DAILY 90 tablet 0   • losartan (COZAAR) 50 MG tablet Take 1 tablet by mouth Daily. 90 tablet 3   • Magnesium 250 MG tablet Take  by mouth.     • Misc Natural Products (JOINT SUPPORT COMPLEX PO) Take  by mouth.     • nystatin (MYCOSTATIN) 258702 UNIT/ML suspension Swish and swallow 5 mL 4 (Four) Times a Day. 400 mL 1   • tiotropium (Spiriva HandiHaler) 18 MCG per inhalation capsule Place 1 capsule into inhaler and inhale Daily. Inhale 1 capsule by mouth daily. 30 capsule 8   • vitamin B-12 (CYANOCOBALAMIN) 100 MCG tablet Take  by mouth.       Facility-Administered Medications Prior to Visit   Medication Dose Route Frequency Provider Last Rate Last Admin   • levalbuterol (XOPENEX) nebulizer solution 0.63 mg  0.63 mg Nebulization Q6H PRN Padmini Jha MD   0.63 mg at 11/01/19 9368       Patient Active  "Problem List   Diagnosis   • Benign essential hypertension   • Chronic obstructive pulmonary disease (CMS/HCC)   • Hyperlipidemia   • Shoulder joint pain   • Subclinical hypothyroidism   • Exertional dyspnea   • Paroxysmal atrial flutter (CMS/HCC)       Advanced Care Planning:  ACP discussion was held with the patient during this visit. Patient has an advance directive (not in EMR), copy requested.    Review of Systems    Compared to one year ago, the patient feels his physical health is the same.  Compared to one year ago, the patient feels his mental health is the same.    Reviewed chart for potential of high risk medication in the elderly: yes  Reviewed chart for potential of harmful drug interactions in the elderly:yes    Objective         Vitals:    12/14/20 1146   BP: 118/58   Pulse: 73   Temp: 97.3 °F (36.3 °C)   SpO2: 94%   Weight: 83.5 kg (184 lb)   Height: 175.3 cm (69\")       Body mass index is 27.17 kg/m².  Discussed the patient's BMI with him. The BMI is in the acceptable range.    Physical Exam  Constitutional:       Comments: Chronically ill o2 dep elderly wm. Appearance improved from prior.    HENT:      Head: Normocephalic and atraumatic.      Right Ear: Tympanic membrane normal.      Left Ear: Tympanic membrane normal.      Nose: Nose normal.      Mouth/Throat:      Mouth: Mucous membranes are moist.   Eyes:      Extraocular Movements: Extraocular movements intact.      Pupils: Pupils are equal, round, and reactive to light.   Neck:      Musculoskeletal: Normal range of motion.   Cardiovascular:      Rate and Rhythm: Normal rate and regular rhythm.      Pulses: Normal pulses.      Heart sounds: Normal heart sounds.   Pulmonary:      Effort: Pulmonary effort is normal.      Breath sounds: Normal breath sounds.      Comments: Distant breath sounds  Abdominal:      General: Abdomen is flat.      Palpations: Abdomen is soft.   Genitourinary:     Penis: Normal.    Musculoskeletal: Normal range of " motion.   Skin:     General: Skin is warm and dry.      Comments: onchytic changes B feet   Neurological:      General: No focal deficit present.      Mental Status: He is alert and oriented to person, place, and time.   Psychiatric:         Mood and Affect: Mood normal.         Behavior: Behavior normal.         Thought Content: Thought content normal.         Judgment: Judgment normal.         Lab Results   Component Value Date    GLU 93 12/01/2020    CHLPL 118 10/20/2020    TRIG 43 10/20/2020    HDL 70 (H) 10/20/2020    LDL 37 10/20/2020    VLDL 11 10/20/2020        Assessment/Plan   Medicare Risks and Personalized Health Plan  CMS Preventative Services Quick Reference  Advance Directive Discussion  Cardiovascular risk  Fall Risk  Glaucoma Risk  Hearing Problem    The above risks/problems have been discussed with the patient.  Pertinent information has been shared with the patient in the After Visit Summary.  Follow up plans and orders are seen below in the Assessment/Plan Section.    Diagnoses and all orders for this visit:    1. Healthcare maintenance (Primary)    2. Benign essential hypertension    3. Chronic obstructive pulmonary disease, unspecified COPD type (CMS/[facility])    4. Chronic congestive heart failure, unspecified heart failure type (CMS/[facility])    5. Hypothyroidism, unspecified type    Other orders  -     butenafine (LOTRIMIN ULTRA) 1 % cream; Apply  topically to the appropriate area as directed 2 (Two) Times a Day.  Dispense: 30 g; Refill: 5      Follow Up:  No follow-ups on file.     An After Visit Summary and PPPS were given to the patient.      Patient is current on hcm. He will continue supplemental o2 and routine activity. Breathing much improved after resuming prior dosing of lasix and will test renal and electrolyte status routinely. Will continue to monitor bp and thyroid and will remain on synthroid replacement therapy. Diffuse fungal changes of feet and toe nails. To start bid lotrimin ultra.  Aware this will probably not resolve issue but should reduce itching. He will f/u in 6 months or prn. Total visit >40 min w/ >505 spent counseling patient.

## 2020-12-23 ENCOUNTER — HOSPITAL ENCOUNTER (EMERGENCY)
Facility: HOSPITAL | Age: 85
Discharge: HOME | End: 2020-12-24
Attending: EMERGENCY MEDICINE | Admitting: EMERGENCY MEDICINE

## 2020-12-23 ENCOUNTER — APPOINTMENT (OUTPATIENT)
Dept: GENERAL RADIOLOGY | Facility: HOSPITAL | Age: 85
End: 2020-12-23

## 2020-12-23 DIAGNOSIS — I48.92 ATRIAL FLUTTER WITH RAPID VENTRICULAR RESPONSE (HCC): Primary | ICD-10-CM

## 2020-12-23 LAB
ALBUMIN SERPL-MCNC: 3.7 G/DL (ref 3.5–5.2)
ALBUMIN/GLOB SERPL: 1.1 G/DL
ALP SERPL-CCNC: 70 U/L (ref 39–117)
ALT SERPL W P-5'-P-CCNC: 22 U/L (ref 1–41)
ANION GAP SERPL CALCULATED.3IONS-SCNC: 9.6 MMOL/L (ref 5–15)
AST SERPL-CCNC: 30 U/L (ref 1–40)
BASOPHILS # BLD AUTO: 0.02 10*3/MM3 (ref 0–0.2)
BASOPHILS NFR BLD AUTO: 0.3 % (ref 0–1.5)
BILIRUB SERPL-MCNC: 0.5 MG/DL (ref 0–1.2)
BUN SERPL-MCNC: 17 MG/DL (ref 8–23)
BUN/CREAT SERPL: 23 (ref 7–25)
CALCIUM SPEC-SCNC: 8.9 MG/DL (ref 8.6–10.5)
CHLORIDE SERPL-SCNC: 103 MMOL/L (ref 98–107)
CO2 SERPL-SCNC: 24.4 MMOL/L (ref 22–29)
CREAT SERPL-MCNC: 0.74 MG/DL (ref 0.76–1.27)
DEPRECATED RDW RBC AUTO: 42.8 FL (ref 37–54)
EOSINOPHIL # BLD AUTO: 0.18 10*3/MM3 (ref 0–0.4)
EOSINOPHIL NFR BLD AUTO: 2.7 % (ref 0.3–6.2)
ERYTHROCYTE [DISTWIDTH] IN BLOOD BY AUTOMATED COUNT: 12.6 % (ref 12.3–15.4)
GFR SERPL CREATININE-BSD FRML MDRD: 101 ML/MIN/1.73
GLOBULIN UR ELPH-MCNC: 3.5 GM/DL
GLUCOSE SERPL-MCNC: 103 MG/DL (ref 65–99)
HCT VFR BLD AUTO: 42 % (ref 37.5–51)
HGB BLD-MCNC: 14.4 G/DL (ref 13–17.7)
IMM GRANULOCYTES # BLD AUTO: 0.03 10*3/MM3 (ref 0–0.05)
IMM GRANULOCYTES NFR BLD AUTO: 0.5 % (ref 0–0.5)
LYMPHOCYTES # BLD AUTO: 1.23 10*3/MM3 (ref 0.7–3.1)
LYMPHOCYTES NFR BLD AUTO: 18.8 % (ref 19.6–45.3)
MCH RBC QN AUTO: 32.4 PG (ref 26.6–33)
MCHC RBC AUTO-ENTMCNC: 34.3 G/DL (ref 31.5–35.7)
MCV RBC AUTO: 94.6 FL (ref 79–97)
MONOCYTES # BLD AUTO: 0.87 10*3/MM3 (ref 0.1–0.9)
MONOCYTES NFR BLD AUTO: 13.3 % (ref 5–12)
NEUTROPHILS NFR BLD AUTO: 4.22 10*3/MM3 (ref 1.7–7)
NEUTROPHILS NFR BLD AUTO: 64.4 % (ref 42.7–76)
NRBC BLD AUTO-RTO: 0 /100 WBC (ref 0–0.2)
NT-PROBNP SERPL-MCNC: 313.1 PG/ML (ref 0–1800)
PLATELET # BLD AUTO: 241 10*3/MM3 (ref 140–450)
PMV BLD AUTO: 10.9 FL (ref 6–12)
POTASSIUM SERPL-SCNC: 4 MMOL/L (ref 3.5–5.2)
PROT SERPL-MCNC: 7.2 G/DL (ref 6–8.5)
RBC # BLD AUTO: 4.44 10*6/MM3 (ref 4.14–5.8)
SODIUM SERPL-SCNC: 137 MMOL/L (ref 136–145)
T4 FREE SERPL-MCNC: 1.15 NG/DL (ref 0.93–1.7)
TROPONIN T SERPL-MCNC: <0.01 NG/ML (ref 0–0.03)
TSH SERPL DL<=0.05 MIU/L-ACNC: 2.24 UIU/ML (ref 0.27–4.2)
WBC # BLD AUTO: 6.55 10*3/MM3 (ref 3.4–10.8)

## 2020-12-23 PROCEDURE — 83880 ASSAY OF NATRIURETIC PEPTIDE: CPT | Performed by: PHYSICIAN ASSISTANT

## 2020-12-23 PROCEDURE — 84443 ASSAY THYROID STIM HORMONE: CPT | Performed by: PHYSICIAN ASSISTANT

## 2020-12-23 PROCEDURE — C9803 HOPD COVID-19 SPEC COLLECT: HCPCS | Performed by: PHYSICIAN ASSISTANT

## 2020-12-23 PROCEDURE — 93005 ELECTROCARDIOGRAM TRACING: CPT | Performed by: PHYSICIAN ASSISTANT

## 2020-12-23 PROCEDURE — 85025 COMPLETE CBC W/AUTO DIFF WBC: CPT | Performed by: PHYSICIAN ASSISTANT

## 2020-12-23 PROCEDURE — 84484 ASSAY OF TROPONIN QUANT: CPT | Performed by: PHYSICIAN ASSISTANT

## 2020-12-23 PROCEDURE — 99284 EMERGENCY DEPT VISIT MOD MDM: CPT

## 2020-12-23 PROCEDURE — U0004 COV-19 TEST NON-CDC HGH THRU: HCPCS | Performed by: PHYSICIAN ASSISTANT

## 2020-12-23 PROCEDURE — 93010 ELECTROCARDIOGRAM REPORT: CPT | Performed by: INTERNAL MEDICINE

## 2020-12-23 PROCEDURE — 96374 THER/PROPH/DIAG INJ IV PUSH: CPT

## 2020-12-23 PROCEDURE — 84439 ASSAY OF FREE THYROXINE: CPT | Performed by: PHYSICIAN ASSISTANT

## 2020-12-23 PROCEDURE — 80053 COMPREHEN METABOLIC PANEL: CPT | Performed by: PHYSICIAN ASSISTANT

## 2020-12-23 PROCEDURE — 71045 X-RAY EXAM CHEST 1 VIEW: CPT

## 2020-12-23 RX ORDER — SODIUM CHLORIDE 0.9 % (FLUSH) 0.9 %
10 SYRINGE (ML) INJECTION AS NEEDED
Status: DISCONTINUED | OUTPATIENT
Start: 2020-12-23 | End: 2020-12-24 | Stop reason: HOSPADM

## 2020-12-23 RX ADMIN — METOPROLOL TARTRATE 2.5 MG: 5 INJECTION INTRAVENOUS at 21:25

## 2020-12-24 VITALS
RESPIRATION RATE: 20 BRPM | HEART RATE: 87 BPM | OXYGEN SATURATION: 95 % | TEMPERATURE: 98.1 F | DIASTOLIC BLOOD PRESSURE: 90 MMHG | SYSTOLIC BLOOD PRESSURE: 124 MMHG

## 2020-12-24 LAB — SARS-COV-2 ORF1AB RESP QL NAA+PROBE: NOT DETECTED

## 2020-12-24 NOTE — ED PROVIDER NOTES
EMERGENCY DEPARTMENT ENCOUNTER    Room Number:  07/07  Date of encounter:  12/24/2020  PCP: Padmini Jha MD  Historian: Patient      HPI:  Chief Complaint: Rapid heart rate and shortness of breath  A complete HPI/ROS/PMH/PSH/SH/FH are unobtainable due to: Nothing    Context: Cuauhtemoc Buck is a 85 y.o. male who presents to the ED c/o rapid heart rate and shortness of breath that began approximately 2-1/2 hours PTA.  Patient states the rapid heart rate preceded shortness of breath.  He informs me that due to his COPD is on 3 L of oxygen 80% of the time at home.  He denies associated chest pain, abdominal pain, cough, fever, chills, unilateral leg swelling, hemoptysis associated with this chest palpitations and shortness of breath.  He further denies associated dizziness, lightheadedness, near syncope.    Reviewing the patient's chart it appears he has a history of paroxysmal atrial flutter, CHF, COPD, bradycardia.  He was seen by Dr. Trejo on 7/8/2019 for tachycardic episode that occurred while he was exercising.  On 5/2018 he had an echocardiogram which showed an ejection fraction of 57%, trace tricuspid regurgitation.  He wore a Holter monitor which was benign.  Vascular screening showed a normal abdominal aorta.  Given the atrial flutter the plan was to continue with diltiazem for rate control.  He is not appear to be on any anticoagulants.    PAST MEDICAL HISTORY  Active Ambulatory Problems     Diagnosis Date Noted   • Benign essential hypertension 07/24/2016   • Chronic obstructive pulmonary disease (CMS/HCC) 07/24/2016   • Hyperlipidemia 07/24/2016   • Shoulder joint pain 07/24/2016   • Subclinical hypothyroidism 07/24/2016   • Exertional dyspnea 02/07/2017   • Paroxysmal atrial flutter (CMS/HCC) 05/22/2018     Resolved Ambulatory Problems     Diagnosis Date Noted   • Hypertension 03/16/2018     Past Medical History:   Diagnosis Date   • Abnormal CT scan    • Acute back pain    • Acute sinusitis    • Allergic  conjunctivitis    • Allergic rhinitis    • Bronchitis    • COPD (chronic obstructive pulmonary disease) (CMS/HCC)    • TRINH (dyspnea on exertion)    • Dyspnea    • Edema    • Emphysema lung (CMS/HCC)    • Fatigue    • EDILMA (obstructive sleep apnea)    • Pain in joint of left shoulder    • Palpitations    • Polyp, sigmoid colon    • Sleep apnea    • SOBOE (shortness of breath on exertion)    • Tachycardia    • Trigger finger          PAST SURGICAL HISTORY  Past Surgical History:   Procedure Laterality Date   • COLONOSCOPY N/A 10/28/2016    Procedure: COLONOSCOPY INTO CECUM WITH POLYPECTOMY X 2;  Surgeon: Carli Khanna MD;  Location: [facility] ENDOSCOPY;  Service:    • CYST REMOVAL  1960         FAMILY HISTORY  Family History   Problem Relation Age of Onset   • Heart disease Mother    • Diabetes Mother    • Hypertension Mother    • Stroke Mother    • Cancer Father    • Hypertension Father    • Heart disease Brother          SOCIAL HISTORY  Social History     Socioeconomic History   • Marital status:      Spouse name: Not on file   • Number of children: Not on file   • Years of education: Not on file   • Highest education level: Not on file   Tobacco Use   • Smoking status: Former Smoker     Packs/day: 1.50     Years: 35.00     Pack years: 52.50     Types: Cigarettes     Quit date: 2005     Years since quitting: 15.9   • Smokeless tobacco: Never Used   • Tobacco comment: caffeine use   Substance and Sexual Activity   • Alcohol use: Yes     Binge frequency: Weekly     Comment: 5-7 beer/liquor drinks a week   • Drug use: No   • Sexual activity: Defer         ALLERGIES  Patient has no known allergies.        REVIEW OF SYSTEMS  Review of Systems     All systems reviewed and negative except for those discussed in HPI.       PHYSICAL EXAM    I have reviewed the triage vital signs and nursing notes.    ED Triage Vitals [12/23/20 2053]   Temp Heart Rate Resp BP SpO2   98.1 °F (36.7 °C) (!) 131 20 124/72 96 %      Temp  src Heart Rate Source Patient Position BP Location FiO2 (%)   -- -- -- -- --       Physical Exam  GENERAL: Well nourished, nontoxic, no acute distress  HENT: nares patent,, mucous membranes moist, head atraumatic  EYES: no scleral icterus, PERRL  CV: Irregularly irregular and rapid, no obvious murmur, no obvious JVD or significant pedal edema  RESPIRATORY: normal effort, lungs CTA B  ABDOMEN: soft, nontender  MUSCULOSKELETAL: no deformity  NEURO: alert and oriented x4, moves all extremities, follows commands  SKIN: warm, dry        LAB RESULTS  Recent Results (from the past 24 hour(s))   COVID-19,APTIMA PANTHER,JOSE M[facility]IN IN-HOUSE, NP/OP SWAB IN UTM/VTM/[facility] TRANSPORT MEDIA,24 HR TAT - Swab, Nasopharynx    Collection Time: 12/23/20  9:05 PM    Specimen: Nasopharynx; Swab   Result Value Ref Range    COVID19 Not Detected Not Detected - Ref. Range   ECG 12 Lead    Collection Time: 12/23/20  9:10 PM   Result Value Ref Range    QT Interval 310 ms   Comprehensive Metabolic Panel    Collection Time: 12/23/20  9:13 PM    Specimen: Blood   Result Value Ref Range    Glucose 103 (H) 65 - 99 mg/dL    BUN 17 8 - 23 mg/dL    Creatinine 0.74 (L) 0.76 - 1.27 mg/dL    Sodium 137 136 - 145 mmol/L    Potassium 4.0 3.5 - 5.2 mmol/L    Chloride 103 98 - 107 mmol/L    CO2 24.4 22.0 - 29.0 mmol/L    Calcium 8.9 8.6 - 10.5 mg/dL    Total Protein 7.2 6.0 - 8.5 g/dL    Albumin 3.70 3.50 - 5.20 g/dL    ALT (SGPT) 22 1 - 41 U/L    AST (SGOT) 30 1 - 40 U/L    Alkaline Phosphatase 70 39 - 117 U/L    Total Bilirubin 0.5 0.0 - 1.2 mg/dL    eGFR Non African Amer 101 >60 mL/min/1.73    Globulin 3.5 gm/dL    A/G Ratio 1.1 g/dL    BUN/Creatinine Ratio 23.0 7.0 - 25.0    Anion Gap 9.6 5.0 - 15.0 mmol/L   Troponin    Collection Time: 12/23/20  9:13 PM    Specimen: Blood   Result Value Ref Range    Troponin T <0.010 0.000 - 0.030 ng/mL   TSH    Collection Time: 12/23/20  9:13 PM    Specimen: Blood   Result Value Ref Range    TSH 2.240 0.270 - 4.200 uIU/mL    T4, Free    Collection Time: 12/23/20  9:13 PM    Specimen: Blood   Result Value Ref Range    Free T4 1.15 0.93 - 1.70 ng/dL   CBC Auto Differential    Collection Time: 12/23/20  9:13 PM    Specimen: Blood   Result Value Ref Range    WBC 6.55 3.40 - 10.80 10*3/mm3    RBC 4.44 4.14 - 5.80 10*6/mm3    Hemoglobin 14.4 13.0 - 17.7 g/dL    Hematocrit 42.0 37.5 - 51.0 %    MCV 94.6 79.0 - 97.0 fL    MCH 32.4 26.6 - 33.0 pg    MCHC 34.3 31.5 - 35.7 g/dL    RDW 12.6 12.3 - 15.4 %    RDW-SD 42.8 37.0 - 54.0 fl    MPV 10.9 6.0 - 12.0 fL    Platelets 241 140 - 450 10*3/mm3    Neutrophil % 64.4 42.7 - 76.0 %    Lymphocyte % 18.8 (L) 19.6 - 45.3 %    Monocyte % 13.3 (H) 5.0 - 12.0 %    Eosinophil % 2.7 0.3 - 6.2 %    Basophil % 0.3 0.0 - 1.5 %    Immature Grans % 0.5 0.0 - 0.5 %    Neutrophils, Absolute 4.22 1.70 - 7.00 10*3/mm3    Lymphocytes, Absolute 1.23 0.70 - 3.10 10*3/mm3    Monocytes, Absolute 0.87 0.10 - 0.90 10*3/mm3    Eosinophils, Absolute 0.18 0.00 - 0.40 10*3/mm3    Basophils, Absolute 0.02 0.00 - 0.20 10*3/mm3    Immature Grans, Absolute 0.03 0.00 - 0.05 10*3/mm3    nRBC 0.0 0.0 - 0.2 /100 WBC   BNP    Collection Time: 12/23/20  9:13 PM    Specimen: Blood   Result Value Ref Range    proBNP 313.1 0.0-1,800.0 pg/mL   ECG 12 Lead    Collection Time: 12/23/20 10:37 PM   Result Value Ref Range    QT Interval 365 ms       Ordered the above labs and independently reviewed the results.        RADIOLOGY  Xr Chest 1 View    Result Date: 12/23/2020  CHEST X-RAY 1 View:  HISTORY: Chest pain and palpitations   COMPARISON:   CT chest 02/07/2020, chest x-ray 07/26/2013.  FINDINGS:  The cardiomediastinal silhouette is normal in size. Atherosclerotic calcifications at the aortic arch.. Scarring at the bilateral lung apices. Scarring versus atelectasis at the bilateral lung bases with a background of previously characterized emphysema. No focal consolidation No pleural effusion. No pneumothorax.  No acute osseous abnormality.       Emphysema without any acute cardiopulmonary disease.  This report was finalized on 12/23/2020 11:12 PM by Dr. Jere Mcdermott M.D.        I ordered the above noted radiological studies. Reviewed by me and discussed with radiologist.  See dictation for official radiology interpretation.      PROCEDURES    Procedures      MEDICATIONS GIVEN IN ER    Medications   metoprolol tartrate (LOPRESSOR) injection 2.5 mg (2.5 mg Intravenous Given 12/23/20 2125)         PROGRESS, DATA ANALYSIS, CONSULTS, AND MEDICAL DECISION MAKING    All labs have been independently reviewed by me.  All radiology studies have been reviewed by me and discussed with radiologist dictating the report.   EKG's independently viewed and interpreted by me.  Discussion below represents my analysis of pertinent findings related to patient's condition, differential diagnosis, treatment plan and final disposition.    Working diagnosis is paroxysmal a flutter.  The patient converted with 2.5 mg I of metoprolol IV.  He was monitored for 2 and half hours and remained in sinus rhythm prior to discharge.  He ambulated prior to discharge without any lightheadedness, dizziness, or chest discomfort.  He informs me he is feeling his baseline self at discharge.  Please see below for discussions with cardiology and plan of care.    ED Course as of Dec 24 0432   Wed Dec 23, 2020   2130 EKG          EKG time: 2110  Rhythm/Rate: 130/Aflutter  P waves and MS:   QRS, axis: Normal Axis   ST and T waves: Non-specific t wave cgs inferiorly     Interpreted Contemporaneously by me, independently viewed  -a flutter and t wave cgs are new from 7/28/2013    Patient converted with 2.5 metoprolol IV, His EKG at 2237:    EKG          EKG time: 2237  Rhythm/Rate: 79/Sinus  P waves and MS: Normal MS interval  QRS, axis: Normal Axis   ST and T waves: T waves are no longer flipped in inferior leads     Interpreted Contemporaneously by me, independently viewed      [RC]   0808 Chest  x-ray shows no acute infiltrates as viewed by me.  There does appear to be some chronic scarring and changes of emphysema.    [RC]   2318 WBC: 6.55 [RC]   2318 RBC: 4.44 [RC]   2318 Hemoglobin: 14.4 [RC]   2318 Hematocrit: 42.0 [RC]   2318 Platelets: 241 [RC]   2318 Glucose(!): 103 [RC]   2318 BUN: 17 [RC]   2318 Creatinine(!): 0.74 [RC]   2318 Potassium: 4.0 [RC]   2318 CO2: 24.4 [RC]   2318 Anion Gap: 9.6 [RC]   2318 Troponin T: <0.010 [RC]   2318 TSH Baseline: 2.240 [RC]   2319 Free T4: 1.15 [RC]   2319 proBNP: 313.1 [RC]   2342 Discussed patient's case with Dr. Trejo.  To increase the patient's Cardizem 30 mg from twice daily to 3 times daily.  Have him call the office in the morning to set up a follow-up appointment.  To order a 24-hour Holter monitor for the patient.    [RC]   2347 Patient informs me he wore a Holter monitor over the weekend and just turned 1 in approximately 48 hours ago.  Given this information I will defer further Holter monitor evaluation to cardiology at his follow-up appointment.    [RC]      ED Course User Index  [RC] Juan Jose Dominguez III, PA       Patient was placed in face mask in first look. Patient was wearing facemask when I entered the room and throughout our encounter. I wore full protective equipment throughout this patient encounter including a face mask, eye shield, gown and gloves. Hand hygiene was performed before donning protective equipment and after removal when leaving the room.    AS OF 04:32 EST VITALS:    BP - 124/90  HR - 87  TEMP - 98.1 °F (36.7 °C)  O2 SATS - 95%        DIAGNOSIS  Final diagnoses:   Atrial flutter with rapid ventricular response (CMS/HCC)-resolved         DISPOSITION  DISCHARGE    Patient discharged in stable condition.    Reviewed implications of results, diagnosis, meds, responsibility to follow up, warning signs and symptoms of possible worsening, potential complications and reasons to return to ER.    Patient/Family voiced understanding  of above instructions.    Discussed plan for discharge, as there is no emergent indication for admission. Patient referred to primary care provider for BP management due to today's BP. Pt/family is agreeable and understands need for follow up and repeat testing.  Pt is aware that discharge does not mean that nothing is wrong but it indicates no emergency is present that requires admission and they must continue care with follow-up as given below or physician of their choice.     FOLLOW-UP  Peggy Trejo MD  [address] [address]  [phone]    Schedule an appointment as soon as possible for a visit   For further evaluation and treatment         Medication List      No changes were made to your prescriptions during this visit.                Juan Jose Dominguez III, PA  12/24/20 5798

## 2020-12-24 NOTE — ED NOTES
Pt arrived via ems form home. Pt called for elevated heart rate. Pt denies cp or soa.. pt on 3L o2 at all times. Pt has hx of afib.     Pt placed in mask. rn in ppe      Devante Rojas RN  12/23/20 2052

## 2020-12-24 NOTE — ED NOTES
Pt states he called [facility] for his heart racing denies any CP or other sxs. Was on heart monitor over weekend for heart racing and turned monitor in on monday     Erinn Pimentel RN  12/23/20 2106

## 2020-12-24 NOTE — DISCHARGE INSTRUCTIONS
Increase your Cardizem from 30 mg twice a day to 3 times a day.  Call Dr. Trejo's office in the morning to set up a follow-up appointment for after the holidays.  Return to the emergency department should you have any further runs of A. fib/a flutter with a rapid heart rate.

## 2020-12-24 NOTE — ED PROVIDER NOTES
I have supervised the care provided by the midlevel provider.    We have discussed this patient's history, physical exam, and treatment plan.   I have reviewed the note and have personally examined the patient and agree with the plan of care.  See attached attending note.  My personal findings are below:    Patient complains of increased heart rate and shortness of breath that began several hours ago.  Patient has a history of A. fib/flutter.  He is not anticoagulated.  Patient is on 3 L oxygen at home at all times secondary to COPD.  He denies chest pain, dizziness, fainting, or fever.    On exam: Awake and alert.  Heart is irregularly irregular but normal rate.  Lungs are clear bilaterally.  Abdomen is soft and nontender.  No pedal edema.  No calf tenderness.    Plan is to obtain labs, chest x-ray, and EKG.    2215: Labs are unremarkable.  Chest x-ray is pending.  Case will be discussed with cardiology.    EKG          EKG time: 2110  Rhythm/Rate: Atrial flutter, rate 130  P waves and AZ: Regular  QRS, axis: Normal  ST and T waves: Minimal ST depression inferiorly    Interpreted Contemporaneously by me, independently viewed  EKG is changed compared to prior EKG done 10/29/2020.  Sinus rhythm was present at that time.       Mannie Del Real MD  12/24/20 0138

## 2020-12-25 LAB
QT INTERVAL: 310 MS
QT INTERVAL: 365 MS

## 2020-12-31 ENCOUNTER — OFFICE VISIT (OUTPATIENT)
Dept: CARDIOLOGY | Facility: CLINIC | Age: 85
End: 2020-12-31

## 2020-12-31 VITALS
HEART RATE: 63 BPM | HEIGHT: 69 IN | SYSTOLIC BLOOD PRESSURE: 108 MMHG | DIASTOLIC BLOOD PRESSURE: 68 MMHG | WEIGHT: 188 LBS | BODY MASS INDEX: 27.85 KG/M2

## 2020-12-31 DIAGNOSIS — I49.3 PVC (PREMATURE VENTRICULAR CONTRACTION): ICD-10-CM

## 2020-12-31 DIAGNOSIS — I48.0 PAF (PAROXYSMAL ATRIAL FIBRILLATION) (HCC): Primary | ICD-10-CM

## 2020-12-31 DIAGNOSIS — I10 BENIGN ESSENTIAL HYPERTENSION: ICD-10-CM

## 2020-12-31 PROCEDURE — 99214 OFFICE O/P EST MOD 30 MIN: CPT | Performed by: NURSE PRACTITIONER

## 2020-12-31 PROCEDURE — 93000 ELECTROCARDIOGRAM COMPLETE: CPT | Performed by: NURSE PRACTITIONER

## 2020-12-31 NOTE — PROGRESS NOTES
Date of Office Visit: 2020  Encounter Provider: Bijal Colorado, YAMINI, APRN  Place of Service: [facility] CARDIOLOGY  Patient Name: Cuauhtemoc Buck  :1935        Subjective:     Chief Complaint:  Follow-up, atrial fibrillation/flutter, hypertension      History of Present Illness:  Cuauhtemoc Buck is a 85 y.o. male patient of Dr. Trejo.  This is my first time seeing this patient in the office today and I have reviewed his records.    Patient has a history of hypertension, hyperlipidemia, paroxysmal atrial fibrillation/flutter, frequent PACs and PVCs, COPD, emphysema.    PER PREVIOUS OFFICE NOTE: He was referred by Dr. Padmini Jha for dyspnea.  He had an episode of tachycardia and in May 2013 had an echo which showed normal left ventricular systolic function with an ejection fraction of 66%, normal diastolic function no significant valvular disease.  He had a Lexiscan perfusion stress test which showed no evidence of ischemia.  He wore 24-hour Holter monitor which was normal.  In May 2018, he had an echocardiogram which showed normal left ventricular systolic function, trace tricuspid regurgitation, RVSP 37 mmHg.  He had a benign 24-hour Holter monitor.  Vascular screening showed normal abdominal aorta, no peripheral arterial disease and mild plaque without stenosis in the carotid arteries.  He was seen in the cardiac evaluation clinic May 2018 with an episode of atrial flutter.  He converted back to normal sinus rhythm spontaneously and was started on diltiazem.  He was not anticoagulated.  He wore a 2-week mobile telemetry device 2019 which showed predominant rhythm sinus with first-degree AV block.  There were frequent short burst of SVT showing sinus tachycardia at 110 bpm.  There were isolated ventricular ectopics 1.6% of the time.  There was no atrial fibrillation or flutter. THE FOLLOWING IS MY CONTRIBUTION TO NOTE:     Primary care provider ordered repeat 48 hour  holter monitor study 12/2020.  This showed intermittent episodes of atrial fibrillation with rapid ventricular response with heart rates up to 120s to 140s.  Most of the episodes were fairly brief.  Longest episode lasted 13 minutes and 2 seconds.  Total time in atrial fibrillation was 0.45%.  Frequent premature atrial contractions accounting for 3.2% of the monitored time also noted.  Frequent PVCs accounting for 4.6% of the monitored time was also noted.  Single brief episode of nonsustained ventricular tachycardia at 3 beats also noted.      He was seen in the ER 12/23 with atrial flutter with rapid rate of 130 bpm.  He was given 2.5 mg of IV metoprolol and converted to sinus rhythm.  He was monitored for 2 and half hours and maintained sinus rhythm.  Case was discussed with Dr. Trejo.  Patient was instructed to increase Cardizem from 30 mg twice daily to 30 mg 3 times a day and call the office in the morning to set up a follow-up appointment.      Patient presents to office today for follow-up appointment.  Family friend is with patient in the office today, per patient preference.  Patient reports he has been feeling well since ER visit, he feels that the 30 mg of diltiazem every 8 hours is the right dose for him.  He feels better on this than he did on the long-acting 120 mg a day.  He has not noticed any recurrent elevated heart rate readings since ER visit.  Prior to the ER visit he had noticed his heart rate in the 160s to 170s, asymptomatic.  He denies chest pain or discomfort, palpitations, racing heartbeat sensation, dizziness, syncope, or fatigue.  He does get some occasional bilateral ankle swelling but has not had this recently.  Appears euvolemic on exam.  He has some chronic mild shortness of breath but does not notice this with his exercise routine.  No chest pain with exertion either.  He is exercising 4 to 5 days a week doing 15 to 30 minutes on the treadmill, also doing some squats and push-ups.   He gets about 8000-10,000 steps a day.  He has been using a fitness tracker which reflects this as well.  He denies a history of falls or balance issues.  Denies a history of any bleeding issues or GI issues or ulcers.          Past Medical History:   Diagnosis Date   • Abnormal CT scan    • Acute back pain    • Acute sinusitis    • Allergic conjunctivitis    • Allergic rhinitis    • Benign essential hypertension    • Bronchitis    • COPD (chronic obstructive pulmonary disease) (CMS/[facility])    • TRINH (dyspnea on exertion)    • Dyspnea    • Edema    • Emphysema lung (CMS/[facility])    • Fatigue    • Hyperlipidemia    • Hypertension    • EDILMA (obstructive sleep apnea)    • Pain in joint of left shoulder    • Palpitations    • Polyp, sigmoid colon    • Sleep apnea    • SOBOE (shortness of breath on exertion)    • Subclinical hypothyroidism    • Tachycardia     sudden   • Trigger finger      Past Surgical History:   Procedure Laterality Date   • COLONOSCOPY N/A 10/28/2016    Procedure: COLONOSCOPY INTO CECUM WITH POLYPECTOMY X 2;  Surgeon: Carli Khanna MD;  Location: [facility] ENDOSCOPY;  Service:    • CYST REMOVAL  1960     Outpatient Medications Prior to Visit   Medication Sig Dispense Refill   • albuterol sulfate  (90 Base) MCG/ACT inhaler Inhale 2 puffs Every 6 (Six) Hours As Needed for Wheezing. 1 inhaler 5   • atorvastatin (LIPITOR) 20 MG tablet TAKE ONE TABLET BY MOUTH DAILY 30 tablet 2   • butenafine (LOTRIMIN ULTRA) 1 % cream Apply  topically to the appropriate area as directed 2 (Two) Times a Day. 30 g 5   • doxazosin (CARDURA) 2 MG tablet TAKE ONE TABLET BY MOUTH ONCE NIGHTLY 90 tablet 1   • fluticasone (FLONASE) 50 MCG/ACT nasal spray 2 sprays into the nostril(s) as directed by provider Daily.     • Fluticasone-Salmeterol (ADVAIR DISKUS IN) Inhale.     • furosemide (LASIX) 20 MG tablet TAKE ONE TABLET BY MOUTH TWICE A DAY (Patient taking differently: Take 40 mg by mouth 2 (Two) Times a Day.) 180 tablet 0   •  ipratropium-albuterol (DUO-NEB) 0.5-2.5 mg/3 ml nebulizer Take 3 mL by nebulization Every 4 (Four) Hours As Needed for Wheezing. 90 vial 1   • levothyroxine (SYNTHROID, LEVOTHROID) 50 MCG tablet TAKE ONE TABLET BY MOUTH DAILY 90 tablet 0   • losartan (COZAAR) 50 MG tablet Take 1 tablet by mouth Daily. 90 tablet 3   • Magnesium 250 MG tablet Take  by mouth.     • Misc Natural Products (JOINT SUPPORT COMPLEX PO) Take  by mouth.     • nystatin (MYCOSTATIN) 369073 UNIT/ML suspension Swish and swallow 5 mL 4 (Four) Times a Day. 400 mL 1   • O2 (OXYGEN) Inhale 1 (One) Time. 2.5-3 liters     • tiotropium (Spiriva HandiHaler) 18 MCG per inhalation capsule Place 1 capsule into inhaler and inhale Daily. Inhale 1 capsule by mouth daily. 30 capsule 8   • vitamin B-12 (CYANOCOBALAMIN) 100 MCG tablet Take  by mouth.     • vitamin D3 125 MCG (5000 UT) capsule capsule Take 5,000 Units by mouth Daily.     • dilTIAZem (Cardizem) 30 MG tablet Take 1 tablet by mouth 2 (two) times a day. (Patient taking differently: Take 60 mg by mouth 2 (two) times a day.) 180 tablet 1     Facility-Administered Medications Prior to Visit   Medication Dose Route Frequency Provider Last Rate Last Admin   • levalbuterol (XOPENEX) nebulizer solution 0.63 mg  0.63 mg Nebulization Q6H PRN Padmini Jha MD   0.63 mg at 19 1503       Allergies as of 2020   • (No Known Allergies)     Social History     Socioeconomic History   • Marital status:      Spouse name: Not on file   • Number of children: Not on file   • Years of education: Not on file   • Highest education level: Not on file   Tobacco Use   • Smoking status: Former Smoker     Packs/day: 1.50     Years: 35.00     Pack years: 52.50     Types: Cigarettes     Quit date:      Years since quittin.0   • Smokeless tobacco: Never Used   • Tobacco comment: caffeine use   Substance and Sexual Activity   • Alcohol use: Yes     Binge frequency: Weekly     Comment: 5-7 beer/liquor  "drinks a week   • Drug use: No   • Sexual activity: Defer     Family History   Problem Relation Age of Onset   • Heart disease Mother    • Diabetes Mother    • Hypertension Mother    • Stroke Mother    • Cancer Father    • Hypertension Father    • Heart disease Brother        Review of Systems   Constitution: Positive for malaise/fatigue. Negative for chills, fever, weight gain and weight loss.   HENT: Positive for hearing loss. Negative for ear pain, nosebleeds and sore throat.    Eyes: Positive for blurred vision (Chronic). Negative for double vision, redness, vision loss in left eye, vision loss in right eye and visual disturbance.   Cardiovascular:        SEE HPI   Respiratory: Positive for cough and shortness of breath. Negative for snoring and wheezing.    Endocrine: Negative for cold intolerance and heat intolerance.   Skin: Positive for itching. Negative for rash and suspicious lesions.   Musculoskeletal: Positive for myalgias. Negative for joint pain and joint swelling.   Gastrointestinal: Negative for abdominal pain, diarrhea, hematemesis, melena, nausea and vomiting.   Genitourinary: Positive for frequency. Negative for dysuria and hematuria.        ED   Neurological: Negative for dizziness, headaches, numbness, paresthesias and seizures.   Psychiatric/Behavioral: Negative for altered mental status and depression. The patient is not nervous/anxious.           Objective:     Vitals:    12/31/20 1123   BP: 108/68   BP Location: Left arm   Pulse: 63   Weight: 85.3 kg (188 lb)   Height: 175.3 cm (69\")     Body mass index is 27.76 kg/m².      PHYSICAL EXAM:  Constitutional:       General: Not in acute distress.     Appearance: Well-developed. Not diaphoretic.   Eyes:      Pupils: Pupils are equal, round, and reactive to light.   HENT:      Head: Normocephalic and atraumatic.         Comments: NC O2 in place  Neck:      Musculoskeletal: Neck supple.      Vascular: No carotid bruit or JVD.   Pulmonary:      " Effort: Pulmonary effort is normal. No respiratory distress.      Breath sounds: Normal breath sounds. No wheezing. No rales.   Cardiovascular:      Normal rate. Regular rhythm.      Murmurs: There is no murmur.      No gallop. No click. No rub.   Pulses:     Intact distal pulses.   Edema:     Peripheral edema absent.   Abdominal:      General: Bowel sounds are normal. There is no distension.      Palpations: Abdomen is soft.   Musculoskeletal: Normal range of motion.         General: No tenderness or deformity.   Skin:     General: Skin is warm and dry.      Findings: No erythema or rash.   Neurological:      Mental Status: Alert and oriented to person, place, and time.   Psychiatric:         Behavior: Behavior normal.         Judgment: Judgment normal.             ECG 12 Lead    Date/Time: 12/31/2020 11:34 AM  Performed by: Bijal Colorado DNP, APRN  Authorized by: Bijal Colorado DNP, APRN   Comparison: compared with previous ECG from 12/23/2020  Comparison to previous ECG: No PVC noted on EKG today.  PAC noted on EKG today.  Rhythm: sinus rhythm  Ectopy: atrial premature contractions  Rate: normal  BPM: 63  Comments: No significant changes from previous EKG              Assessment:       Diagnosis Plan   1. PAF (paroxysmal atrial fibrillation) (CMS/HCC)  Adult Transthoracic Echo Complete w/ Color, Spectral and Contrast if Necessary Per Protocol    apixaban (ELIQUIS) 5 MG tablet tablet    dilTIAZem (Cardizem) 30 MG tablet   2. Benign essential hypertension  dilTIAZem (Cardizem) 30 MG tablet   3. PVC (premature ventricular contraction)  Adult Transthoracic Echo Complete w/ Color, Spectral and Contrast if Necessary Per Protocol    dilTIAZem (Cardizem) 30 MG tablet         Plan:     1. Paroxysmal atrial fibrillation/flutter: Maintaining sinus rhythm in the office today.  Discussed plan of care with Dr. Trejo.  We will look at checking an echocardiogram.  Patient feeling really well on the 30 mg of diltiazem  every 8 hours.  He does not want to go back to the 120 mg of long-acting diltiazem at this time but would consider if he had recurrent episodes of atrial fibrillation/flutter.  He has a heart rate and blood pressure log with him in the office today showing heart rate and blood pressure well controlled at home.  No recurrent elevated heart rate episodes on the increased dose of the diltiazem.  At this point we discussed in detail benefits versus risks of anticoagulation therapy versus not pursuing anticoagulation therapy.  He would like to pursue anticoagulation therapy with Eliquis.  He was given free 30-day prescription card and will also check with the pharmacy when he picks this up to find out if this will be affordable long-term.  He is going to check with [facility] to see if he is eligible for reduced pricing.  If this is not going to be affordable then he will contact the office and we will discuss alternatives.  We discussed importance of avoiding alcohol and NSAIDs with blood thinners.  We discussed signs/symptoms to watch out for and if he were to fall and hit his head he would need to go the ER for CT scan to rule out bleeding.  He verbalized understanding.  In the past he has had a couple of bourbon drinks 2-4 times a week however he feels that this is something he can easily go without.  He has not had any bourbon in at least a week now and is doing fine with this and is fine with continued cessation.  2. Frequent PVCs: Noted on 12/2020 monitor study.  Currently drinking 2 to 4 cups of coffee a day as well as some bourbon during the week.  He plans to cut out alcohol completely and in fact is already done this for over a week now without issues.  He is going to cut back on his caffeine intake to no more than one 8 ounce caffeinated beverage a day, if that.  We will continue the 30 mg of diltiazem every 8 hours per patient preference.  Check an echocardiogram.  Patient is currently exercising at a  good level without any chest pain/discomfort or shortness of breath with exercise.  Plan of care discussed with Dr. Trejo.  Would consider Lexiscan nuclear stress test if LV function abnormal on echocardiogram otherwise we will start with echocardiogram given patient's lack of symptoms with good exercise level.  3. Hypertension: Blood pressure well controlled.  Patient continue to monitor.  4. Hyperlipidemia: PCP managing.  On statin therapy.  5. Left carotid artery plaque: Noted on 7/2020 carotid ultrasounds.  6. COPD with emphysema: Followed by Dr. Hale.  Maintained on home nasal cannula oxygen.  7. Hypothyroidism: Managed by outside provider.  On thyroid replacement.    Patient to keep April follow-up with Dr. Trejo as scheduled or follow-up sooner if needed for any new, recurrent, or worsening symptoms or other issues or concerns.             Your medication list          Accurate as of December 31, 2020  1:08 PM. If you have any questions, ask your nurse or doctor.            START taking these medications      Instructions Last Dose Given Next Dose Due   apixaban 5 MG tablet tablet  Commonly known as: ELIQUIS  Started by: Bijal Colorado DNP, APRN      Take 1 tablet by mouth Every 12 (Twelve) Hours.          CHANGE how you take these medications      Instructions Last Dose Given Next Dose Due   dilTIAZem 30 MG tablet  Commonly known as: Cardizem  What changed: when to take this  Changed by: Bijal Colorado DNP, APRN      Take 1 tablet by mouth 3 (Three) Times a Day.       furosemide 20 MG tablet  Commonly known as: LASIX  What changed: how much to take      TAKE ONE TABLET BY MOUTH TWICE A DAY          CONTINUE taking these medications      Instructions Last Dose Given Next Dose Due   ADVAIR DISKUS IN      Inhale.       albuterol sulfate  (90 Base) MCG/ACT inhaler  Commonly known as: PROVENTIL HFA;VENTOLIN HFA;PROAIR HFA      Inhale 2 puffs Every 6 (Six) Hours As Needed for Wheezing.          atorvastatin 20 MG tablet  Commonly known as: LIPITOR      TAKE ONE TABLET BY MOUTH DAILY       butenafine 1 % cream  Commonly known as: LOTRIMIN ULTRA      Apply  topically to the appropriate area as directed 2 (Two) Times a Day.       doxazosin 2 MG tablet  Commonly known as: CARDURA      TAKE ONE TABLET BY MOUTH ONCE NIGHTLY       fluticasone 50 MCG/ACT nasal spray  Commonly known as: FLONASE      2 sprays into the nostril(s) as directed by provider Daily.       ipratropium-albuterol 0.5-2.5 mg/3 ml nebulizer  Commonly known as: DUO-NEB      Take 3 mL by nebulization Every 4 (Four) Hours As Needed for Wheezing.       JOINT SUPPORT COMPLEX PO      Take  by mouth.       levothyroxine 50 MCG tablet  Commonly known as: SYNTHROID, LEVOTHROID      TAKE ONE TABLET BY MOUTH DAILY       losartan 50 MG tablet  Commonly known as: COZAAR      Take 1 tablet by mouth Daily.       Magnesium 250 MG tablet      Take  by mouth.       nystatin 801670 UNIT/ML suspension  Commonly known as: MYCOSTATIN      Swish and swallow 5 mL 4 (Four) Times a Day.       O2  Commonly known as: OXYGEN      Inhale 1 (One) Time. 2.5-3 liters       tiotropium 18 MCG per inhalation capsule  Commonly known as: Spiriva HandiHaler      Place 1 capsule into inhaler and inhale Daily. Inhale 1 capsule by mouth daily.       vitamin B-12 100 MCG tablet  Commonly known as: CYANOCOBALAMIN      Take  by mouth.       vitamin D3 125 MCG (5000 UT) capsule capsule      Take 5,000 Units by mouth Daily.             Where to Get Your Medications      These medications were sent to [facility] - [address] [place] [address] & [address] - [phone]  - [phone]   [address] [address]    Phone: [phone]   · apixaban 5 MG tablet tablet  · dilTIAZem 30 MG tablet       I did NOT stop or change the above medications other than starting patient on Eliquis, as discussed in plan.  Patient's medication list was  updated to reflect medications they are currently taking including medication changes made by other providers.            Thanks,    Bijal Colorado, YAMINI, APRN  12/31/2020         Dictated utilizing Dragon dictation

## 2021-01-20 ENCOUNTER — HOSPITAL ENCOUNTER (OUTPATIENT)
Dept: CARDIOLOGY | Facility: HOSPITAL | Age: 86
Discharge: HOME | End: 2021-01-20
Admitting: NURSE PRACTITIONER

## 2021-01-20 PROCEDURE — 93306 TTE W/DOPPLER COMPLETE: CPT

## 2021-01-20 PROCEDURE — 93306 TTE W/DOPPLER COMPLETE: CPT | Performed by: INTERNAL MEDICINE

## 2021-01-21 ENCOUNTER — TELEPHONE (OUTPATIENT)
Dept: CARDIOLOGY | Facility: CLINIC | Age: 86
End: 2021-01-21

## 2021-01-21 LAB
ASCENDING AORTA: 3.3 CM
BH CV ECHO MEAS - ACS: 1.9 CM
BH CV ECHO MEAS - AO MAX PG (FULL): 1.9 MMHG
BH CV ECHO MEAS - AO MAX PG: 8 MMHG
BH CV ECHO MEAS - AO MEAN PG (FULL): 1.4 MMHG
BH CV ECHO MEAS - AO MEAN PG: 4.7 MMHG
BH CV ECHO MEAS - AO ROOT AREA (BSA CORRECTED): 1.5
BH CV ECHO MEAS - AO ROOT AREA: 6.7 CM^2
BH CV ECHO MEAS - AO ROOT DIAM: 2.9 CM
BH CV ECHO MEAS - AO V2 MAX: 141.2 CM/SEC
BH CV ECHO MEAS - AO V2 MEAN: 101.1 CM/SEC
BH CV ECHO MEAS - AO V2 VTI: 32.8 CM
BH CV ECHO MEAS - ASC AORTA: 3.3 CM
BH CV ECHO MEAS - AVA(I,A): 2.7 CM^2
BH CV ECHO MEAS - AVA(I,D): 2.7 CM^2
BH CV ECHO MEAS - AVA(V,A): 2.6 CM^2
BH CV ECHO MEAS - AVA(V,D): 2.6 CM^2
BH CV ECHO MEAS - BSA(HAYCOCK): 2.1 M^2
BH CV ECHO MEAS - BSA: 2 M^2
BH CV ECHO MEAS - BZI_BMI: 27.8 KILOGRAMS/M^2
BH CV ECHO MEAS - BZI_METRIC_HEIGHT: 175.3 CM
BH CV ECHO MEAS - BZI_METRIC_WEIGHT: 85.3 KG
BH CV ECHO MEAS - EDV(MOD-SP2): 56 ML
BH CV ECHO MEAS - EDV(MOD-SP4): 84 ML
BH CV ECHO MEAS - EDV(TEICH): 99.1 ML
BH CV ECHO MEAS - EF(CUBED): 93 %
BH CV ECHO MEAS - EF(MOD-BP): 64.7 %
BH CV ECHO MEAS - EF(MOD-SP2): 62.5 %
BH CV ECHO MEAS - EF(MOD-SP4): 65.5 %
BH CV ECHO MEAS - EF(TEICH): 88.5 %
BH CV ECHO MEAS - ESV(MOD-SP2): 21 ML
BH CV ECHO MEAS - ESV(MOD-SP4): 29 ML
BH CV ECHO MEAS - ESV(TEICH): 11.4 ML
BH CV ECHO MEAS - FS: 58.7 %
BH CV ECHO MEAS - IVS/LVPW: 1
BH CV ECHO MEAS - IVSD: 1.2 CM
BH CV ECHO MEAS - LV DIASTOLIC VOL/BSA (35-75): 41.7 ML/M^2
BH CV ECHO MEAS - LV MASS(C)D: 198.2 GRAMS
BH CV ECHO MEAS - LV MASS(C)DI: 98.5 GRAMS/M^2
BH CV ECHO MEAS - LV MAX PG: 6 MMHG
BH CV ECHO MEAS - LV MEAN PG: 3.2 MMHG
BH CV ECHO MEAS - LV SYSTOLIC VOL/BSA (12-30): 14.4 ML/M^2
BH CV ECHO MEAS - LV V1 MAX: 123 CM/SEC
BH CV ECHO MEAS - LV V1 MEAN: 85.3 CM/SEC
BH CV ECHO MEAS - LV V1 VTI: 29.1 CM
BH CV ECHO MEAS - LVIDD: 4.6 CM
BH CV ECHO MEAS - LVIDS: 1.9 CM
BH CV ECHO MEAS - LVLD AP2: 6.3 CM
BH CV ECHO MEAS - LVLD AP4: 7.6 CM
BH CV ECHO MEAS - LVLS AP2: 5.5 CM
BH CV ECHO MEAS - LVLS AP4: 6.4 CM
BH CV ECHO MEAS - LVOT AREA (M): 3.1 CM^2
BH CV ECHO MEAS - LVOT AREA: 3 CM^2
BH CV ECHO MEAS - LVOT DIAM: 2 CM
BH CV ECHO MEAS - LVPWD: 1.2 CM
BH CV ECHO MEAS - MV A DUR: 0.13 SEC
BH CV ECHO MEAS - MV A MAX VEL: 83.6 CM/SEC
BH CV ECHO MEAS - MV DEC SLOPE: 528.7 CM/SEC^2
BH CV ECHO MEAS - MV DEC TIME: 0.2 SEC
BH CV ECHO MEAS - MV E MAX VEL: 120 CM/SEC
BH CV ECHO MEAS - MV E/A: 1.4
BH CV ECHO MEAS - MV MAX PG: 6.2 MMHG
BH CV ECHO MEAS - MV MEAN PG: 2.3 MMHG
BH CV ECHO MEAS - MV P1/2T MAX VEL: 128 CM/SEC
BH CV ECHO MEAS - MV P1/2T: 70.9 MSEC
BH CV ECHO MEAS - MV V2 MAX: 124.9 CM/SEC
BH CV ECHO MEAS - MV V2 MEAN: 69.1 CM/SEC
BH CV ECHO MEAS - MV V2 VTI: 37.1 CM
BH CV ECHO MEAS - MVA P1/2T LCG: 1.7 CM^2
BH CV ECHO MEAS - MVA(P1/2T): 3.1 CM^2
BH CV ECHO MEAS - MVA(VTI): 2.4 CM^2
BH CV ECHO MEAS - PA ACC TIME: 0.11 SEC
BH CV ECHO MEAS - PA MAX PG (FULL): 0.82 MMHG
BH CV ECHO MEAS - PA MAX PG: 3.6 MMHG
BH CV ECHO MEAS - PA PR(ACCEL): 30.3 MMHG
BH CV ECHO MEAS - PA V2 MAX: 94.6 CM/SEC
BH CV ECHO MEAS - PULM A REVS DUR: 0.11 SEC
BH CV ECHO MEAS - PULM A REVS VEL: 24.3 CM/SEC
BH CV ECHO MEAS - PULM DIAS VEL: 61.1 CM/SEC
BH CV ECHO MEAS - PULM S/D: 0.72
BH CV ECHO MEAS - PULM SYS VEL: 44.1 CM/SEC
BH CV ECHO MEAS - PVA(V,A): 2.7 CM^2
BH CV ECHO MEAS - PVA(V,D): 2.7 CM^2
BH CV ECHO MEAS - QP/QS: 0.61
BH CV ECHO MEAS - RAP SYSTOLE: 3 MMHG
BH CV ECHO MEAS - RV MAX PG: 2.8 MMHG
BH CV ECHO MEAS - RV MEAN PG: 1.5 MMHG
BH CV ECHO MEAS - RV V1 MAX: 83.1 CM/SEC
BH CV ECHO MEAS - RV V1 MEAN: 58.1 CM/SEC
BH CV ECHO MEAS - RV V1 VTI: 17.2 CM
BH CV ECHO MEAS - RVOT AREA: 3.1 CM^2
BH CV ECHO MEAS - RVOT DIAM: 2 CM
BH CV ECHO MEAS - RVSP: 40 MMHG
BH CV ECHO MEAS - SI(AO): 109.2 ML/M^2
BH CV ECHO MEAS - SI(CUBED): 46 ML/M^2
BH CV ECHO MEAS - SI(LVOT): 43.8 ML/M^2
BH CV ECHO MEAS - SI(MOD-SP2): 17.4 ML/M^2
BH CV ECHO MEAS - SI(MOD-SP4): 27.3 ML/M^2
BH CV ECHO MEAS - SI(TEICH): 43.6 ML/M^2
BH CV ECHO MEAS - SUP REN AO DIAM: 2 CM
BH CV ECHO MEAS - SV(AO): 219.8 ML
BH CV ECHO MEAS - SV(CUBED): 92.6 ML
BH CV ECHO MEAS - SV(LVOT): 88.2 ML
BH CV ECHO MEAS - SV(MOD-SP2): 35 ML
BH CV ECHO MEAS - SV(MOD-SP4): 55 ML
BH CV ECHO MEAS - SV(RVOT): 53.7 ML
BH CV ECHO MEAS - SV(TEICH): 87.7 ML
BH CV ECHO MEAS - TAPSE (>1.6): 2.4 CM
BH CV ECHO MEAS - TR MAX VEL: 305.3 CM/SEC
BH CV XLRA - RV BASE: 4.2 CM
BH CV XLRA - RV LENGTH: 7.6 CM
BH CV XLRA - RV MID: 2.9 CM
LEFT ATRIUM VOLUME INDEX: 25 ML/M2
LV EF 2D ECHO EST: 65 %
MAXIMAL PREDICTED HEART RATE: 137 BPM
SINUS: 3 CM
STJ: 2.8 CM
STRESS TARGET HR: 116 BPM

## 2021-01-21 NOTE — TELEPHONE ENCOUNTER
I called and discussed echo results and recommendations with patient.  He reports he has seen some high blood pressure readings around 140s, occasional 150 however he is checking before medications.  Asked him to check at least an hour after morning medication and after sitting calmly 5 minutes for the next week and call if staying greater than 130s over 80 on average.  He also bring his blood pressure cuff with him to his March follow-up so we can ensure blood pressure cuff is accurate as blood pressure was much lower during recent office visit.  He will keep March follow-up with Dr. Trejo as scheduled or call sooner for issues or concerns.

## 2021-01-21 NOTE — TELEPHONE ENCOUNTER
Echocardiogram without significant findings, fairly similar to 2018 study.  Continues to have some mild pulmonary hypertension in the setting of COPD with chronic O2 use.  Does have some mild LVH.  We will have him monitor blood pressure at home to ensure it is well controlled.  Would have him keep March follow-up with Dr. Trejo as scheduled or follow-up sooner for issues or concerns.

## 2021-01-21 NOTE — TELEPHONE ENCOUNTER
1/21/21  Pt left [facility] [place] at 2:30 - returning your call - just got his ph fixed yesterday.  Ph [phone]/ginger

## 2021-01-28 RX ORDER — ALBUTEROL SULFATE 90 UG/1
AEROSOL, METERED RESPIRATORY (INHALATION)
Qty: 18 G | Refills: 4 | Status: SHIPPED | OUTPATIENT
Start: 2021-01-28

## 2021-02-04 RX ORDER — ATORVASTATIN CALCIUM 20 MG/1
TABLET, FILM COATED ORAL
Qty: 30 TABLET | Refills: 0 | Status: SHIPPED | OUTPATIENT
Start: 2021-02-04 | End: 2021-06-29

## 2021-02-26 DIAGNOSIS — J44.9 CHRONIC OBSTRUCTIVE PULMONARY DISEASE, UNSPECIFIED COPD TYPE (HCC): ICD-10-CM

## 2021-02-26 RX ORDER — IPRATROPIUM BROMIDE AND ALBUTEROL SULFATE 2.5; .5 MG/3ML; MG/3ML
SOLUTION RESPIRATORY (INHALATION)
Qty: 90 ML | Refills: 3 | Status: SHIPPED | OUTPATIENT
Start: 2021-02-26

## 2021-03-04 ENCOUNTER — TELEPHONE (OUTPATIENT)
Dept: INTERNAL MEDICINE | Facility: CLINIC | Age: 86
End: 2021-03-04

## 2021-03-05 ENCOUNTER — OFFICE VISIT (OUTPATIENT)
Dept: CARDIOLOGY | Facility: CLINIC | Age: 86
End: 2021-03-05

## 2021-03-05 VITALS
DIASTOLIC BLOOD PRESSURE: 60 MMHG | HEART RATE: 62 BPM | HEIGHT: 69 IN | SYSTOLIC BLOOD PRESSURE: 116 MMHG | BODY MASS INDEX: 28.14 KG/M2 | WEIGHT: 190 LBS | TEMPERATURE: 96.9 F | OXYGEN SATURATION: 96 %

## 2021-03-05 DIAGNOSIS — I48.0 PAF (PAROXYSMAL ATRIAL FIBRILLATION) (HCC): Primary | ICD-10-CM

## 2021-03-05 DIAGNOSIS — E78.2 MIXED HYPERLIPIDEMIA: ICD-10-CM

## 2021-03-05 DIAGNOSIS — I10 BENIGN ESSENTIAL HYPERTENSION: ICD-10-CM

## 2021-03-05 DIAGNOSIS — J44.9 CHRONIC OBSTRUCTIVE PULMONARY DISEASE, UNSPECIFIED COPD TYPE (HCC): ICD-10-CM

## 2021-03-05 DIAGNOSIS — I48.92 PAROXYSMAL ATRIAL FLUTTER (HCC): ICD-10-CM

## 2021-03-05 PROCEDURE — 93000 ELECTROCARDIOGRAM COMPLETE: CPT | Performed by: INTERNAL MEDICINE

## 2021-03-05 PROCEDURE — 99214 OFFICE O/P EST MOD 30 MIN: CPT | Performed by: INTERNAL MEDICINE

## 2021-03-05 RX ORDER — FUROSEMIDE 20 MG/1
40 TABLET ORAL 2 TIMES DAILY
Qty: 120 TABLET | Refills: 11 | Status: SHIPPED | OUTPATIENT
Start: 2021-03-05 | End: 2021-06-21 | Stop reason: SDUPTHER

## 2021-03-05 NOTE — PROGRESS NOTES
CARDIOLOGY    Peggy Trejo MD    ENCOUNTER DATE:  03/05/2021    Cuauhtemoc Buck / 85 y.o. / male        CHIEF COMPLAINT / REASON FOR OFFICE VISIT     Atrial Flutter (2 month follow up)      HISTORY OF PRESENT ILLNESS       HPI    Cuauhtemoc Buck is a 85 y.o. male     This is a very pleasant gentleman referred her by Dr. Jha for dyspnea.   He was seen here in 05/2013 because of an episode of tachycardia while he was exercising.   At that time, he had an echo which showed normal left ventricular systolic function with an ejection fraction of 66%.  He had normal diastolic function, and no significant valvular heart disease.  He underwent a Lexiscan nuclear stress test, which showed no evidence of ischemia, and an ejection fraction of 61%.  He wore a 24 hour Holter monitor which was normal.        In 05/2018, he had an echocardiogram, which showed normal left ventricular systolic function with an ejection fraction of 57% and trace tricuspid regurgitation with a right ventricular systolic pressure of 37 mmHg.  He wore a 24-hour Holter monitor, which was benign.  Vascular screening in 09/2018 showed a normal abdominal aorta, no peripheral arterial disease, and mild plaque without stenosis.  He was seen in the [facility] in 05/2018 with an episode of atrial flutter.  He had converted back to sinus rhythm when he was seen.  He was started on diltiazem.  He is not on anticoagulation.      He had a Zio patch in July 2019 showing 110 episodes of SVT felt to be sinus tachycardia.  Carotid Doppler in July 2019 was negative.  Holter monitor in December 2020 showed some paroxysms of atrial fibrillation.  The longest was 13 minutes.  Total amount of atrial fibrillation was 0.45% of the time of the tracing.  Echocardiogram in January 2021 showed normal LV function ejection fraction 65%, mild tricuspid regurgitation with a right ventricular systolic pressure of 40 mmHg.  He was seen in the ER on December 23 of 2020 with atrial  "flutter at a heart rate of 130 bpm.  He converted after 2.5 mg of IV metoprolol.    He has been doing well on the diltiazem 30 mg 3 times a day.  He is not having any palpitations or awareness of arrhythmia.  He denies any chest pains or tightness.  His breathing is at baseline for his COPD.  He uses his oxygen almost all the time.  He does not have any edema    REVIEW OF SYSTEMS     Review of Systems   Constitution: Negative for chills, fever, weight gain and weight loss.   Cardiovascular: Negative for leg swelling.   Respiratory: Positive for shortness of breath and wheezing. Negative for cough and snoring.    Hematologic/Lymphatic: Negative for bleeding problem. Does not bruise/bleed easily.   Skin: Negative for color change.   Musculoskeletal: Positive for joint pain and myalgias. Negative for falls.   Gastrointestinal: Negative for melena.   Genitourinary: Negative for hematuria.   Neurological: Negative for excessive daytime sleepiness.   Psychiatric/Behavioral: Negative for depression. The patient is not nervous/anxious.          VITAL SIGNS     Visit Vitals  /60 (BP Location: Left arm)   Pulse 62   Temp 96.9 °F (36.1 °C)   Ht 175.3 cm (69\")   Wt 86.2 kg (190 lb)   SpO2 96%   BMI 28.06 kg/m²         Wt Readings from Last 3 Encounters:   03/05/21 86.2 kg (190 lb)   12/31/20 85.3 kg (188 lb)   12/14/20 83.5 kg (184 lb)     Body mass index is 28.06 kg/m².      PHYSICAL EXAMINATION     Constitutional:       General: Not in acute distress.  Neck:      Vascular: No carotid bruit or JVD.   Pulmonary:      Effort: Pulmonary effort is normal.      Breath sounds: Normal breath sounds.   Cardiovascular:      Normal rate. Regular rhythm.      Murmurs: There is no murmur.   Psychiatric:         Mood and Affect: Mood and affect normal.           REVIEWED DATA       ECG 12 Lead    Date/Time: 3/5/2021 11:42 AM  Performed by: Peggy Trejo MD  Authorized by: Peggy Trejo MD   Comparison: compared with previous " ECG from 12/31/2020  Similar to previous ECG  Rhythm: sinus rhythm  BPM: 62  Conduction: conduction normal  ST Segments: ST segments normal  T Waves: T waves normal    Clinical impression: normal ECG              Lipid Panel    Lipid Panel 10/20/20   Total Cholesterol 118   Triglycerides 43   HDL Cholesterol 70 (A)   VLDL Cholesterol 11   LDL Cholesterol  37   LDL/HDL Ratio 0.56   (A) Abnormal value              Lab Results   Component Value Date    GLUCOSE 103 (H) 12/23/2020    BUN 17 12/23/2020    CREATININE 0.74 (L) 12/23/2020    EGFRIFNONA 101 12/23/2020    EGFRIFAFRI 106 12/01/2020    BCR 23.0 12/23/2020    K 4.0 12/23/2020    CO2 24.4 12/23/2020    CALCIUM 8.9 12/23/2020    PROTENTOTREF 6.7 10/20/2020    ALBUMIN 3.70 12/23/2020    LABIL2 1.3 10/20/2020    AST 30 12/23/2020    ALT 22 12/23/2020       ASSESSMENT & PLAN      Diagnosis Plan   1. PAF (paroxysmal atrial fibrillation) (CMS/HCC)  ECG 12 Lead   2. Paroxysmal atrial flutter (CMS/HCC)  ECG 12 Lead   3. Mixed hyperlipidemia     4. Benign essential hypertension     5. Chronic obstructive pulmonary disease, unspecified COPD type (CMS/HCC)         1.  Paroxysmal atrial fibrillation atrial flutter.  He is on diltiazem and Eliquis.  [id number] score is 2.  No complications on Eliquis.  Continue current medications.    2.  Frequent PVCs.  Continue diltiazem.  Decrease alcohol and caffeine.  3.  Hypertension.  He brings in a list of his blood pressures which I reviewed.  His blood pressure and heart rate is well controlled.  4.  Hyperlipidemia.  Reviewed lipids above from October.  Continue same management.  5.  COPD with emphysema followed by   6.  Hypothyroidism    He looks good.  I have the  cancel his appointment with me in April and he will follow up with Brianne in 6 months.  No changes to his medications today.    Orders Placed This Encounter   Procedures   • ECG 12 Lead     This order was created via procedure documentation                      MEDICATIONS         Discharge Medications          Accurate as of March 5, 2021  1:28 PM. If you have any questions, ask your nurse or doctor.            Continue These Medications      Instructions Start Date   ADVAIR DISKUS IN   Inhalation      fluticasone-salmeterol 250-50 MCG/DOSE DISKUS  Commonly known as: ADVAIR   INHALE ONE PUFF BY MOUTH TWICE A DAY      albuterol sulfate  (90 Base) MCG/ACT inhaler  Commonly known as: PROVENTIL HFA;VENTOLIN HFA;PROAIR HFA   INHALE TWO PUFFS BY MOUTH EVERY 6 HOURS AS NEEDED FOR WHEEZING      apixaban 5 MG tablet tablet  Commonly known as: ELIQUIS   5 mg, Oral, Every 12 Hours Scheduled      atorvastatin 20 MG tablet  Commonly known as: LIPITOR   TAKE ONE TABLET BY MOUTH DAILY      butenafine 1 % cream  Commonly known as: LOTRIMIN ULTRA   Topical, 2 Times Daily      dilTIAZem 30 MG tablet  Commonly known as: Cardizem   30 mg, Oral, 3 Times Daily      doxazosin 2 MG tablet  Commonly known as: CARDURA   TAKE ONE TABLET BY MOUTH ONCE NIGHTLY      fluticasone 50 MCG/ACT nasal spray  Commonly known as: FLONASE   2 sprays, Nasal, Daily      furosemide 20 MG tablet  Commonly known as: LASIX   40 mg, Oral, 2 Times Daily      ipratropium-albuterol 0.5-2.5 mg/3 ml nebulizer  Commonly known as: DUO-NEB   INHALE THE CONTENT OF ONE VIAL VIA NEBULIZER EVERY 4 HOURS AS NEEDED FOR WHEEZING      JOINT SUPPORT COMPLEX PO   Oral      levothyroxine 50 MCG tablet  Commonly known as: SYNTHROID, LEVOTHROID   TAKE ONE TABLET BY MOUTH DAILY      losartan 50 MG tablet  Commonly known as: COZAAR   50 mg, Oral, Daily      Magnesium 250 MG tablet   Oral      nystatin 037202 UNIT/ML suspension  Commonly known as: MYCOSTATIN   500,000 Units, Swish & Swallow, 4 Times Daily      O2  Commonly known as: OXYGEN   Inhalation, Once, 2.5-3 liters       tiotropium 18 MCG per inhalation capsule  Commonly known as: Spiriva HandiHaler   1 capsule, Inhalation, Daily, Inhale 1 capsule by mouth daily.       vitamin B-12 100 MCG tablet  Commonly known as: CYANOCOBALAMIN   Oral      vitamin D3 125 MCG (5000 UT) capsule capsule   5,000 Units, Oral, Daily               Peggy Trejo MD  03/05/21  13:28 EST    **Meghan Disclaimer:   Much of this encounter note is an electronic transcription/translation of spoken language to printed text. The electronic translation of spoken language may permit erroneous, or at times, nonsensical words or phrases to be inadvertently transcribed. Although I have reviewed the note for such errors, some may still exist.

## 2021-03-11 RX ORDER — TIOTROPIUM BROMIDE 18 UG/1
CAPSULE ORAL; RESPIRATORY (INHALATION)
Qty: 30 CAPSULE | Refills: 3 | Status: SHIPPED | OUTPATIENT
Start: 2021-03-11 | End: 2021-07-06

## 2021-03-28 DIAGNOSIS — I48.0 PAF (PAROXYSMAL ATRIAL FIBRILLATION) (HCC): ICD-10-CM

## 2021-03-29 RX ORDER — APIXABAN 5 MG/1
TABLET, FILM COATED ORAL
Qty: 60 TABLET | Refills: 5 | Status: SHIPPED | OUTPATIENT
Start: 2021-03-29 | End: 2021-06-22 | Stop reason: SDUPTHER

## 2021-05-07 ENCOUNTER — TELEPHONE (OUTPATIENT)
Dept: CARDIOLOGY | Facility: CLINIC | Age: 86
End: 2021-05-07

## 2021-05-07 NOTE — TELEPHONE ENCOUNTER
"Patient called the office today to report that for the \"past 2 weeks I have had a pain in my chest on the left side like right under my tit\"  Pain increases when laying down or going to bed.States that when he is in a sitting position and bend over and drop the left arm it eases. It is constant, does never ever go away.  Patient tried a NSAID(Naproxyn) and it did help \"but it didn't last long\"    Patient has h/o COPD and is on home oxygen. Stated that sometime it hurts more when taking a breath.  Med list from last office visit verified    Please advise on how to proceed    Thank you  Lucero Castillo RN  [facility]       "

## 2021-05-07 NOTE — TELEPHONE ENCOUNTER
Discussed with Dr Trejo, and she would like the pt to schedule an apt for next week with JL or AL    I have called the pt and left a vm for him to call back   Thanks  Laura Galarza RN  Triage nurse

## 2021-05-10 ENCOUNTER — OFFICE VISIT (OUTPATIENT)
Dept: CARDIOLOGY | Facility: CLINIC | Age: 86
End: 2021-05-10

## 2021-05-10 VITALS
HEIGHT: 71 IN | BODY MASS INDEX: 26.6 KG/M2 | DIASTOLIC BLOOD PRESSURE: 60 MMHG | SYSTOLIC BLOOD PRESSURE: 100 MMHG | WEIGHT: 190 LBS | HEART RATE: 62 BPM

## 2021-05-10 DIAGNOSIS — I48.0 PAF (PAROXYSMAL ATRIAL FIBRILLATION) (HCC): Primary | ICD-10-CM

## 2021-05-10 DIAGNOSIS — I10 BENIGN ESSENTIAL HYPERTENSION: ICD-10-CM

## 2021-05-10 DIAGNOSIS — I49.3 PVC (PREMATURE VENTRICULAR CONTRACTION): ICD-10-CM

## 2021-05-10 DIAGNOSIS — J44.9 CHRONIC OBSTRUCTIVE PULMONARY DISEASE, UNSPECIFIED COPD TYPE (HCC): ICD-10-CM

## 2021-05-10 DIAGNOSIS — E78.2 MIXED HYPERLIPIDEMIA: ICD-10-CM

## 2021-05-10 PROCEDURE — 99214 OFFICE O/P EST MOD 30 MIN: CPT | Performed by: NURSE PRACTITIONER

## 2021-05-10 PROCEDURE — 93000 ELECTROCARDIOGRAM COMPLETE: CPT | Performed by: NURSE PRACTITIONER

## 2021-05-10 NOTE — PROGRESS NOTES
Date of Office Visit: 05/10/21  Encounter Provider: ADAN Quick  Place of Service: [facility] CARDIOLOGY  Patient Name: Cuauhtemoc Buck  :1935    Chief Complaint   Patient presents with   • Atrial Fibrillation   :     HPI: Cuauhtemoc Buck is a 85 y.o. male  with history of hypertension, hyperlipidemia, paroxysmal atrial flutter/fibrillation, COPD and emphysema.  He is followed by Dr. Trejo.  I will visit with him in follow up today and have reviewed his medical record.        He was referred by Dr. Padmini Jha for dyspnea.  He had an episode of tachycardia and in May 2013 had an echo which showed normal left ventricular systolic function with an ejection fraction of 66%, normal diastolic function no significant valvular disease.  He had a Lexiscan perfusion stress test which showed no evidence of ischemia.  He wore 24-hour Holter monitor which was normal.  In May 2018, he had an echocardiogram which showed normal left ventricular systolic function, trace tricuspid regurgitation, RVSP 37 mmHg.  He had a benign 24-hour Holter monitor.  Vascular screening showed normal abdominal aorta, no peripheral arterial disease and mild plaque without stenosis in the carotid arteries.  He was seen in the cardiac evaluation clinic May 2018 with an episode of atrial flutter.  He converted back to normal sinus rhythm spontaneously and was started on diltiazem.  He was not anticoagulated.  He wore a 2-week mobile telemetry device 2019 which showed predominant rhythm sinus with first-degree AV block.  There were frequent short burst of SVT showing sinus tachycardia at 110 bpm.  There were isolated ventricular ectopics 1.6% of the time.  There was no atrial fibrillation or flutter.  Follow-up echo 2021 showed normal left ventricular systolic function, moderate mitral annular calcification, mildly elevated RVSP at 40 mmHg    Patient is here for follow-up due to what he believed to be  "chest pain.  But since has resolved upon presentation. pleasant and in no distress.  States that he took a short course of antisteroid old medications and the symptoms resolved 2 days ago, states the actual pain felt like a tight rib or stretch muscle versus actual chest pain.  Patient states most likely related to his workout regimen, he works out usually twice a day in his basement with treadmill, light free weights, and other equipment.  Prior to onset of chest pain, he had no exertional symptoms.  At current he denies chest pain, chest wall muscular skeletal pain, dizziness or fatigue, lightheadedness or palpitations.  Respiratory status stable under Dr. Hale with breathing treatments and inhalers, and continuous oxygen with CPAP at night.      No Known Allergies        Family and social history reviewed.     Review of Systems   Constitutional: Negative for chills, decreased appetite, diaphoresis, fever, malaise/fatigue and night sweats.   Cardiovascular: Negative for chest pain, claudication, dyspnea on exertion, irregular heartbeat, leg swelling, near-syncope, palpitations and syncope.   Respiratory: Positive for cough, shortness of breath, sleep disturbances due to breathing and snoring. Negative for hemoptysis, sputum production and wheezing.    Musculoskeletal: Positive for arthritis and joint pain.     All other systems were reviewed and are negative          Objective:     Vitals:    05/10/21 1506   BP: 100/60   Pulse: 62   Weight: 86.2 kg (190 lb)   Height: 179.1 cm (70.5\")     Body mass index is 26.88 kg/m².    PHYSICAL EXAM:  Constitutional:       General: Awake.      Appearance: Normal and healthy appearance.   Pulmonary:      Effort: Pulmonary effort is normal.      Breath sounds: Normal breath sounds.   Chest:      Chest wall: Not tender to palpatation. Not reproducing clinical pain.   Cardiovascular:      PMI at left midclavicular line. Normal rate. Regular rhythm.      Murmurs: There is no murmur. "      No gallop. No click. No rub.   Musculoskeletal: Normal range of motion.         General: Tenderness present. Neurological:      Mental Status: Alert and easily aroused.   Psychiatric:         Behavior: Behavior is cooperative.           ECG 12 Lead    Date/Time: 5/10/2021 4:03 PM  Performed by: Brianne Peña APRN  Authorized by: Brianne Peña APRN   Comparison: compared with previous ECG   Similar to previous ECG  Rhythm: sinus rhythm  Rate: normal              Current Outpatient Medications   Medication Sig Dispense Refill   • albuterol sulfate  (90 Base) MCG/ACT inhaler INHALE TWO PUFFS BY MOUTH EVERY 6 HOURS AS NEEDED FOR WHEEZING 18 g 4   • atorvastatin (LIPITOR) 20 MG tablet TAKE ONE TABLET BY MOUTH DAILY 30 tablet 0   • dilTIAZem (Cardizem) 30 MG tablet Take 1 tablet by mouth 3 (Three) Times a Day. 270 tablet 1   • doxazosin (CARDURA) 2 MG tablet TAKE ONE TABLET BY MOUTH ONCE NIGHTLY 90 tablet 1   • Eliquis 5 MG tablet tablet TAKE ONE TABLET BY MOUTH EVERY 12 HOURS 60 tablet 5   • fluticasone (FLONASE) 50 MCG/ACT nasal spray 2 sprays into the nostril(s) as directed by provider Daily.     • fluticasone-salmeterol (ADVAIR) 250-50 MCG/DOSE DISKUS INHALE ONE PUFF BY MOUTH TWICE A DAY 60 each 5   • furosemide (LASIX) 20 MG tablet Take 2 tablets by mouth 2 (Two) Times a Day. 120 tablet 11   • ipratropium-albuterol (DUO-NEB) 0.5-2.5 mg/3 ml nebulizer INHALE THE CONTENT OF ONE VIAL VIA NEBULIZER EVERY 4 HOURS AS NEEDED FOR WHEEZING 90 mL 3   • levothyroxine (SYNTHROID, LEVOTHROID) 50 MCG tablet TAKE ONE TABLET BY MOUTH DAILY 90 tablet 0   • losartan (COZAAR) 50 MG tablet Take 1 tablet by mouth Daily. 90 tablet 3   • Magnesium 250 MG tablet Take  by mouth Daily.     • Misc Natural Products (JOINT SUPPORT COMPLEX PO) Take  by mouth.     • nystatin (MYCOSTATIN) 080012 UNIT/ML suspension Swish and swallow 5 mL 4 (Four) Times a Day. 400 mL 1   • O2 (OXYGEN) Inhale 1 (One) Time. 2.5-3 liters     • Spiriva  HandiHaler 18 MCG per inhalation capsule INHALE ONE BY MOUTH DAILY-- PLACE 1 CAPSULE INTO INHALER 30 capsule 3   • vitamin B-12 (CYANOCOBALAMIN) 100 MCG tablet Take  by mouth Daily.     • vitamin D3 125 MCG (5000 UT) capsule capsule Take 5,000 Units by mouth Daily.       Current Facility-Administered Medications   Medication Dose Route Frequency Provider Last Rate Last Admin   • levalbuterol (XOPENEX) nebulizer solution 0.63 mg  0.63 mg Nebulization Q6H PRN Padmini Jha MD   0.63 mg at 11/01/19 1503     Assessment:       Diagnosis Plan   1. PAF (paroxysmal atrial fibrillation) (CMS/[facility])     2. Chronic obstructive pulmonary disease, unspecified COPD type (CMS/[facility])     3. Benign essential hypertension     4. Mixed hyperlipidemia     5. PVC (premature ventricular contraction)          No orders of the defined types were placed in this encounter.        Plan:       1.  85-year-old gentleman with history of atrial flutter May 2018 then recurrent atrial fibrillation maintained on diltiazem and Eliquis 5 mg twice daily  2.  Hypertension blood pressure appears well controlled, stable at 110/60  3.  Hyperlipidemia on atorvastatin 20 mg, continue diet and exercise.  4.  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  He follows with Dr. Hale and has follow up approx 01/2021.  Wear CPAP at night every night.   5.  Hypothyroidism on replacement therapy, will defer treatment and levels to GP.  6.  Blurred vision started 6 months ago he is following with his eye doctor very closely and is now on his second set of Lens in the last 6 weeks.  7.  Carotid artery plaque in the left no stenosis on duplex July 2020  8.  Atypical chest pain.  His symptoms resolved spontaneously on Friday but he did take 3 days worth of naproxen.  He is to resume his usual workout regimen and call with any recurrence of chest pain.  At this time we have agreed to postpone stress testing since his  EKG has not changed.      Follow up in September as scheduled            It has been a pleasure to participate in this patient's care.      Thank you,  ADAN Quick      **I used Dragon to dictate this note:**

## 2021-05-24 ENCOUNTER — TELEPHONE (OUTPATIENT)
Dept: CARDIOLOGY | Facility: CLINIC | Age: 86
End: 2021-05-24

## 2021-05-24 NOTE — TELEPHONE ENCOUNTER
S/W patient. Blood pressure is stable. He denies chest pain. He plans to resume some routine exercise this week.

## 2021-06-03 DIAGNOSIS — E03.8 SUBCLINICAL HYPOTHYROIDISM: ICD-10-CM

## 2021-06-03 DIAGNOSIS — I10 BENIGN ESSENTIAL HYPERTENSION: Primary | ICD-10-CM

## 2021-06-03 DIAGNOSIS — E78.2 MIXED HYPERLIPIDEMIA: ICD-10-CM

## 2021-06-09 RX ORDER — DOXAZOSIN 2 MG/1
TABLET ORAL
Qty: 30 TABLET | Refills: 0 | Status: SHIPPED | OUTPATIENT
Start: 2021-06-09 | End: 2021-06-21 | Stop reason: SDUPTHER

## 2021-06-10 LAB
ALBUMIN SERPL-MCNC: 4.1 G/DL (ref 3.5–5.2)
ALBUMIN/GLOB SERPL: 1.5 G/DL
ALP SERPL-CCNC: 71 U/L (ref 39–117)
ALT SERPL-CCNC: 25 U/L (ref 1–41)
AST SERPL-CCNC: 33 U/L (ref 1–40)
BASOPHILS # BLD AUTO: 0.03 10*3/MM3 (ref 0–0.2)
BASOPHILS NFR BLD AUTO: 0.4 % (ref 0–1.5)
BILIRUB SERPL-MCNC: 0.7 MG/DL (ref 0–1.2)
BUN SERPL-MCNC: 17 MG/DL (ref 8–23)
BUN/CREAT SERPL: 21.3 (ref 7–25)
CALCIUM SERPL-MCNC: 9.1 MG/DL (ref 8.6–10.5)
CHLORIDE SERPL-SCNC: 101 MMOL/L (ref 98–107)
CHOLEST SERPL-MCNC: 116 MG/DL (ref 0–200)
CO2 SERPL-SCNC: 25.6 MMOL/L (ref 22–29)
CREAT SERPL-MCNC: 0.8 MG/DL (ref 0.76–1.27)
EOSINOPHIL # BLD AUTO: 0.34 10*3/MM3 (ref 0–0.4)
EOSINOPHIL NFR BLD AUTO: 4.8 % (ref 0.3–6.2)
ERYTHROCYTE [DISTWIDTH] IN BLOOD BY AUTOMATED COUNT: 13.3 % (ref 12.3–15.4)
GLOBULIN SER CALC-MCNC: 2.8 GM/DL
GLUCOSE SERPL-MCNC: 101 MG/DL (ref 65–99)
HCT VFR BLD AUTO: 43.5 % (ref 37.5–51)
HDLC SERPL-MCNC: 56 MG/DL (ref 40–60)
HGB BLD-MCNC: 14.4 G/DL (ref 13–17.7)
IMM GRANULOCYTES # BLD AUTO: 0.02 10*3/MM3 (ref 0–0.05)
IMM GRANULOCYTES NFR BLD AUTO: 0.3 % (ref 0–0.5)
LDLC SERPL CALC-MCNC: 50 MG/DL (ref 0–100)
LYMPHOCYTES # BLD AUTO: 1.89 10*3/MM3 (ref 0.7–3.1)
LYMPHOCYTES NFR BLD AUTO: 26.7 % (ref 19.6–45.3)
MCH RBC QN AUTO: 32.4 PG (ref 26.6–33)
MCHC RBC AUTO-ENTMCNC: 33.1 G/DL (ref 31.5–35.7)
MCV RBC AUTO: 98 FL (ref 79–97)
MONOCYTES # BLD AUTO: 1 10*3/MM3 (ref 0.1–0.9)
MONOCYTES NFR BLD AUTO: 14.1 % (ref 5–12)
NEUTROPHILS # BLD AUTO: 3.8 10*3/MM3 (ref 1.7–7)
NEUTROPHILS NFR BLD AUTO: 53.7 % (ref 42.7–76)
NRBC BLD AUTO-RTO: 0 /100 WBC (ref 0–0.2)
PLATELET # BLD AUTO: 278 10*3/MM3 (ref 140–450)
POTASSIUM SERPL-SCNC: 3.8 MMOL/L (ref 3.5–5.2)
PROT SERPL-MCNC: 6.9 G/DL (ref 6–8.5)
RBC # BLD AUTO: 4.44 10*6/MM3 (ref 4.14–5.8)
SODIUM SERPL-SCNC: 138 MMOL/L (ref 136–145)
TRIGL SERPL-MCNC: 37 MG/DL (ref 0–150)
TSH SERPL DL<=0.005 MIU/L-ACNC: 2.55 UIU/ML (ref 0.27–4.2)
VLDLC SERPL CALC-MCNC: 10 MG/DL (ref 5–40)
WBC # BLD AUTO: 7.08 10*3/MM3 (ref 3.4–10.8)

## 2021-06-15 ENCOUNTER — OFFICE VISIT (OUTPATIENT)
Dept: INTERNAL MEDICINE | Facility: CLINIC | Age: 86
End: 2021-06-15

## 2021-06-15 VITALS
HEIGHT: 70 IN | BODY MASS INDEX: 27.2 KG/M2 | HEART RATE: 70 BPM | WEIGHT: 190 LBS | SYSTOLIC BLOOD PRESSURE: 108 MMHG | DIASTOLIC BLOOD PRESSURE: 68 MMHG

## 2021-06-15 DIAGNOSIS — R35.0 URINARY FREQUENCY: Primary | ICD-10-CM

## 2021-06-15 DIAGNOSIS — R32 URINARY INCONTINENCE, UNSPECIFIED TYPE: ICD-10-CM

## 2021-06-15 DIAGNOSIS — J44.9 CHRONIC OBSTRUCTIVE PULMONARY DISEASE, UNSPECIFIED COPD TYPE (HCC): ICD-10-CM

## 2021-06-15 DIAGNOSIS — I48.0 PAF (PAROXYSMAL ATRIAL FIBRILLATION) (HCC): ICD-10-CM

## 2021-06-15 DIAGNOSIS — E78.2 MIXED HYPERLIPIDEMIA: ICD-10-CM

## 2021-06-15 PROCEDURE — 99214 OFFICE O/P EST MOD 30 MIN: CPT | Performed by: INTERNAL MEDICINE

## 2021-06-15 RX ORDER — SOLIFENACIN SUCCINATE 5 MG/1
5 TABLET, FILM COATED ORAL DAILY
Qty: 90 TABLET | Refills: 1 | Status: SHIPPED | OUTPATIENT
Start: 2021-06-15 | End: 2021-06-21 | Stop reason: SDUPTHER

## 2021-06-15 NOTE — PROGRESS NOTES
Chief Complaint   Patient presents with   • Hypertension   • Hyperlipidemia   • Urinary Frequency     w/ incontinence   • COPD       History of Present Illness   Cuauhtemoc Buck is a 86 y.o. male presents for follow up evaluation w/ several acute needs. Patient has copd, atrial fib, htn, hyperlipidemia.   He is rate controlled w/ cardizem and is anticoagulated w/ eliquis. Running hypotensive now and is taking furosemide twice daily. He has urinary frequency w/ urinary incontinence. Copd  O2 dependent. Routinely getting fitness and does note some shortness of breath chronically.       The following portions of the patient's history were reviewed and updated as appropriate: allergies, current medications, past family history, past medical history, past social history, past surgical history and problem list.  Current Outpatient Medications on File Prior to Visit   Medication Sig Dispense Refill   • albuterol sulfate  (90 Base) MCG/ACT inhaler INHALE TWO PUFFS BY MOUTH EVERY 6 HOURS AS NEEDED FOR WHEEZING 18 g 4   • atorvastatin (LIPITOR) 20 MG tablet TAKE ONE TABLET BY MOUTH DAILY 30 tablet 0   • dilTIAZem (Cardizem) 30 MG tablet Take 1 tablet by mouth 3 (Three) Times a Day. 270 tablet 1   • doxazosin (CARDURA) 2 MG tablet TAKE ONE TABLET BY MOUTH ONCE NIGHTLY 30 tablet 0   • Eliquis 5 MG tablet tablet TAKE ONE TABLET BY MOUTH EVERY 12 HOURS 60 tablet 5   • fluticasone (FLONASE) 50 MCG/ACT nasal spray 2 sprays into the nostril(s) as directed by provider Daily.     • fluticasone-salmeterol (ADVAIR) 250-50 MCG/DOSE DISKUS INHALE ONE PUFF BY MOUTH TWICE A DAY 60 each 5   • furosemide (LASIX) 20 MG tablet Take 2 tablets by mouth 2 (Two) Times a Day. 120 tablet 11   • ipratropium-albuterol (DUO-NEB) 0.5-2.5 mg/3 ml nebulizer INHALE THE CONTENT OF ONE VIAL VIA NEBULIZER EVERY 4 HOURS AS NEEDED FOR WHEEZING 90 mL 3   • levothyroxine (SYNTHROID, LEVOTHROID) 50 MCG tablet TAKE ONE TABLET BY MOUTH DAILY 90 tablet 0   •  losartan (COZAAR) 50 MG tablet Take 1 tablet by mouth Daily. 90 tablet 3   • Magnesium 250 MG tablet Take  by mouth Daily.     • Misc Natural Products (JOINT SUPPORT COMPLEX PO) Take  by mouth.     • nystatin (MYCOSTATIN) 736802 UNIT/ML suspension Swish and swallow 5 mL 4 (Four) Times a Day. 400 mL 1   • O2 (OXYGEN) Inhale 1 (One) Time. 2.5-3 liters     • Spiriva HandiHaler 18 MCG per inhalation capsule INHALE ONE BY MOUTH DAILY-- PLACE 1 CAPSULE INTO INHALER 30 capsule 3   • vitamin B-12 (CYANOCOBALAMIN) 100 MCG tablet Take  by mouth Daily.     • vitamin D3 125 MCG (5000 UT) capsule capsule Take 5,000 Units by mouth Daily.       Current Facility-Administered Medications on File Prior to Visit   Medication Dose Route Frequency Provider Last Rate Last Admin   • levalbuterol (XOPENEX) nebulizer solution 0.63 mg  0.63 mg Nebulization Q6H PRN Padmini Jha MD   0.63 mg at 11/01/19 1503     Review of Systems   Constitutional: Negative.    HENT: Negative.    Eyes: Negative.    Respiratory: Positive for shortness of breath. Negative for wheezing.    Cardiovascular: Negative for chest pain and leg swelling.   Gastrointestinal: Negative.    Endocrine: Negative.    Genitourinary: Positive for frequency.   Musculoskeletal: Positive for arthralgias.   Skin: Negative.    Allergic/Immunologic: Negative.    Neurological: Negative.    Hematological: Bruises/bleeds easily.   Psychiatric/Behavioral: Negative.        Objective   Physical Exam  Vitals and nursing note reviewed.   Constitutional:       Appearance: Normal appearance. He is well-developed.   HENT:      Head: Normocephalic and atraumatic.      Right Ear: External ear normal.      Left Ear: External ear normal.      Ears:      Comments: Aids in place bilateral     Nose: Nose normal.      Mouth/Throat:      Mouth: Mucous membranes are moist.   Eyes:      Extraocular Movements: Extraocular movements intact.      Pupils: Pupils are equal, round, and reactive to light.  "  Cardiovascular:      Rate and Rhythm: Normal rate and regular rhythm.      Pulses: Normal pulses.      Heart sounds: Normal heart sounds.   Pulmonary:      Effort: Pulmonary effort is normal. No respiratory distress.      Breath sounds: Normal breath sounds.   Abdominal:      General: Abdomen is flat.      Palpations: Abdomen is soft.   Musculoskeletal:         General: Normal range of motion.      Cervical back: Normal range of motion and neck supple.   Skin:     General: Skin is warm and dry.   Neurological:      General: No focal deficit present.      Mental Status: He is alert and oriented to person, place, and time.   Psychiatric:         Mood and Affect: Mood normal.         Behavior: Behavior normal.         Thought Content: Thought content normal.         Judgment: Judgment normal.          /68   Pulse 70   Ht 177.8 cm (70\")   Wt 86.2 kg (190 lb)   BMI 27.26 kg/m²     Assessment/Plan   Diagnoses and all orders for this visit:    Urinary frequency  -     Urinalysis With Culture If Indicated -    Chronic obstructive pulmonary disease, unspecified COPD type (CMS/HCC)    Subclinical hypothyroidism    PAF (paroxysmal atrial fibrillation) (CMS/HCC)    Mixed hyperlipidemia      Patient w/ copd, afib, hypothyroid, dyslipidemia. Rate controlled w/ anticoagulant. Will test urine today. Will reduce furosemide to one am and 1/2 in the pm. If still having disruptive nocturia then add vesicare at night. Thyroid is euthyroid. Will continue O2 w/ routine physical activity as tolerated. Will f/u in 6 months for awv or prn.   Cbc cmp lipid tsh psa u/a prior.             "

## 2021-06-16 LAB
APPEARANCE UR: CLEAR
BACTERIA #/AREA URNS HPF: NORMAL /HPF
BILIRUB UR QL STRIP: NEGATIVE
CASTS URNS QL MICRO: NORMAL /LPF
COLOR UR: YELLOW
EPI CELLS #/AREA URNS HPF: NORMAL /HPF (ref 0–10)
GLUCOSE UR QL: NEGATIVE
HGB UR QL STRIP: NEGATIVE
KETONES UR QL STRIP: NEGATIVE
LEUKOCYTE ESTERASE UR QL STRIP: NEGATIVE
MICRO URNS: NORMAL
MICRO URNS: NORMAL
NITRITE UR QL STRIP: NEGATIVE
PH UR STRIP: 7 [PH] (ref 5–7.5)
PROT UR QL STRIP: NEGATIVE
RBC #/AREA URNS HPF: NORMAL /HPF (ref 0–2)
SP GR UR: 1.01 (ref 1–1.03)
URINALYSIS REFLEX: NORMAL
UROBILINOGEN UR STRIP-MCNC: 0.2 MG/DL (ref 0.2–1)
WBC #/AREA URNS HPF: NORMAL /HPF (ref 0–5)

## 2021-06-17 ENCOUNTER — TELEPHONE (OUTPATIENT)
Dept: INTERNAL MEDICINE | Facility: CLINIC | Age: 86
End: 2021-06-17

## 2021-06-17 NOTE — TELEPHONE ENCOUNTER
Caller: Cuauhtemoc Buck    Relationship: Self    Best call back number: [phone]    What specialty or service is being requested: UROLOGY     PATIENT CALLED STATING THAT HE WOULD LIKE TO HAVE REFERRAL TO DR ORMO TO BE CHANGED TO A PROVIDER ON [facility]. PATIENT UNABLE TO IDENTIFY PROVIDER NAME OR OFFICE NAME. PATIENT WILL CALL BACK WITH MORE INFORMATION OF WHERE HE WOULD LIKE THIS SENT TO

## 2021-06-18 ENCOUNTER — TELEPHONE (OUTPATIENT)
Dept: INTERNAL MEDICINE | Facility: CLINIC | Age: 86
End: 2021-06-18

## 2021-06-18 NOTE — TELEPHONE ENCOUNTER
Caller: Cuauhtemoc Buck    Relationship: Self    Best call back number: [phone]    What is the best time to reach you: ANYTIME    Who are you requesting to speak with (clinical staff, provider,  specific staff member): CLINICAL STAFF    What was the call regarding: PATIENT CALLED STATING DR HAWK HAD REFERRED HIM TO DR ROMO (UROLOGY) PATIENT WOULD LIKE TO KNOW IF THERE IS A UROLOGIST ON [facility]. PATIENT PREFERS TO NOT GO DOWNTOWN TO SEE A DOCTOR.    Do you require a callback: YES

## 2021-06-18 NOTE — TELEPHONE ENCOUNTER
Spoke with patient and gave patient [facility] number so he can call and schedule at the [place] location. Patient agreed

## 2021-06-21 DIAGNOSIS — I49.3 PVC (PREMATURE VENTRICULAR CONTRACTION): ICD-10-CM

## 2021-06-21 DIAGNOSIS — I10 BENIGN ESSENTIAL HYPERTENSION: ICD-10-CM

## 2021-06-21 DIAGNOSIS — I48.0 PAF (PAROXYSMAL ATRIAL FIBRILLATION) (HCC): ICD-10-CM

## 2021-06-21 RX ORDER — LEVOTHYROXINE SODIUM 0.05 MG/1
50 TABLET ORAL DAILY
Qty: 90 TABLET | Refills: 3 | Status: SHIPPED | OUTPATIENT
Start: 2021-06-21 | End: 2021-08-09

## 2021-06-21 RX ORDER — LOSARTAN POTASSIUM 50 MG/1
50 TABLET ORAL DAILY
Qty: 90 TABLET | Refills: 3 | Status: SHIPPED | OUTPATIENT
Start: 2021-06-21 | End: 2021-08-30

## 2021-06-21 RX ORDER — SOLIFENACIN SUCCINATE 5 MG/1
5 TABLET, FILM COATED ORAL DAILY
Qty: 90 TABLET | Refills: 3 | Status: SHIPPED | OUTPATIENT
Start: 2021-06-21 | End: 2021-09-02 | Stop reason: ALTCHOICE

## 2021-06-21 RX ORDER — FUROSEMIDE 20 MG/1
40 TABLET ORAL 2 TIMES DAILY
Qty: 360 TABLET | Refills: 3 | Status: SHIPPED | OUTPATIENT
Start: 2021-06-21

## 2021-06-21 RX ORDER — DOXAZOSIN 2 MG/1
2 TABLET ORAL NIGHTLY
Qty: 90 TABLET | Refills: 3 | Status: SHIPPED | OUTPATIENT
Start: 2021-06-21 | End: 2021-08-09

## 2021-06-22 DIAGNOSIS — I48.0 PAF (PAROXYSMAL ATRIAL FIBRILLATION) (HCC): ICD-10-CM

## 2021-06-22 NOTE — TELEPHONE ENCOUNTER
PATIENT CALLED AND STATED HE HAS AN APPOINTMENT IN December AND WANTED TO KNOW IF HE HAS TO DO ANYTHING PRIOR TO THE APPOINTMENT.      PLEASE ADVISE     CALL BACK [phone]  
Pt informed that he does not need labs  
complains of pain/discomfort

## 2021-06-29 ENCOUNTER — PATIENT OUTREACH (OUTPATIENT)
Dept: PHARMACY | Facility: HOSPITAL | Age: 86
End: 2021-06-29

## 2021-06-29 RX ORDER — ATORVASTATIN CALCIUM 20 MG/1
TABLET, FILM COATED ORAL
Qty: 90 TABLET | Refills: 3 | Status: SHIPPED | OUTPATIENT
Start: 2021-06-29 | End: 2021-10-01

## 2021-07-06 RX ORDER — TIOTROPIUM BROMIDE 18 UG/1
CAPSULE ORAL; RESPIRATORY (INHALATION)
Qty: 30 CAPSULE | Refills: 2 | Status: SHIPPED | OUTPATIENT
Start: 2021-07-06 | End: 2021-11-22 | Stop reason: SDUPTHER

## 2021-08-07 ENCOUNTER — APPOINTMENT (OUTPATIENT)
Dept: GENERAL RADIOLOGY | Facility: HOSPITAL | Age: 86
End: 2021-08-07

## 2021-08-07 ENCOUNTER — HOSPITAL ENCOUNTER (EMERGENCY)
Facility: HOSPITAL | Age: 86
Discharge: HOME | End: 2021-08-08
Attending: EMERGENCY MEDICINE | Admitting: EMERGENCY MEDICINE

## 2021-08-07 DIAGNOSIS — R00.0 TACHYCARDIA: ICD-10-CM

## 2021-08-07 DIAGNOSIS — I48.91 ATRIAL FIBRILLATION, UNSPECIFIED TYPE (HCC): Primary | ICD-10-CM

## 2021-08-07 LAB
ALBUMIN SERPL-MCNC: 4.1 G/DL (ref 3.5–5.2)
ALBUMIN/GLOB SERPL: 1.1 G/DL
ALP SERPL-CCNC: 68 U/L (ref 39–117)
ALT SERPL W P-5'-P-CCNC: 28 U/L (ref 1–41)
ANION GAP SERPL CALCULATED.3IONS-SCNC: 8.5 MMOL/L (ref 5–15)
APTT PPP: 37.2 SECONDS (ref 22.7–35.4)
AST SERPL-CCNC: 35 U/L (ref 1–40)
BASOPHILS # BLD AUTO: 0.03 10*3/MM3 (ref 0–0.2)
BASOPHILS NFR BLD AUTO: 0.4 % (ref 0–1.5)
BILIRUB SERPL-MCNC: 0.5 MG/DL (ref 0–1.2)
BUN SERPL-MCNC: 13 MG/DL (ref 8–23)
BUN/CREAT SERPL: 16.7 (ref 7–25)
CALCIUM SPEC-SCNC: 9.4 MG/DL (ref 8.6–10.5)
CHLORIDE SERPL-SCNC: 105 MMOL/L (ref 98–107)
CO2 SERPL-SCNC: 25.5 MMOL/L (ref 22–29)
CREAT SERPL-MCNC: 0.78 MG/DL (ref 0.76–1.27)
DEPRECATED RDW RBC AUTO: 48.4 FL (ref 37–54)
EOSINOPHIL # BLD AUTO: 0.36 10*3/MM3 (ref 0–0.4)
EOSINOPHIL NFR BLD AUTO: 4.9 % (ref 0.3–6.2)
ERYTHROCYTE [DISTWIDTH] IN BLOOD BY AUTOMATED COUNT: 13.1 % (ref 12.3–15.4)
GFR SERPL CREATININE-BSD FRML MDRD: 95 ML/MIN/1.73
GLOBULIN UR ELPH-MCNC: 3.7 GM/DL
GLUCOSE SERPL-MCNC: 98 MG/DL (ref 65–99)
HCT VFR BLD AUTO: 48.2 % (ref 37.5–51)
HGB BLD-MCNC: 15.4 G/DL (ref 13–17.7)
IMM GRANULOCYTES # BLD AUTO: 0.02 10*3/MM3 (ref 0–0.05)
IMM GRANULOCYTES NFR BLD AUTO: 0.3 % (ref 0–0.5)
INR PPP: 1.24 (ref 0.9–1.1)
LYMPHOCYTES # BLD AUTO: 1.78 10*3/MM3 (ref 0.7–3.1)
LYMPHOCYTES NFR BLD AUTO: 24.4 % (ref 19.6–45.3)
MAGNESIUM SERPL-MCNC: 2.3 MG/DL (ref 1.6–2.4)
MCH RBC QN AUTO: 31.8 PG (ref 26.6–33)
MCHC RBC AUTO-ENTMCNC: 32 G/DL (ref 31.5–35.7)
MCV RBC AUTO: 99.4 FL (ref 79–97)
MONOCYTES # BLD AUTO: 1.06 10*3/MM3 (ref 0.1–0.9)
MONOCYTES NFR BLD AUTO: 14.5 % (ref 5–12)
NEUTROPHILS NFR BLD AUTO: 4.06 10*3/MM3 (ref 1.7–7)
NEUTROPHILS NFR BLD AUTO: 55.5 % (ref 42.7–76)
NRBC BLD AUTO-RTO: 0 /100 WBC (ref 0–0.2)
NT-PROBNP SERPL-MCNC: 598.9 PG/ML (ref 0–1800)
PLATELET # BLD AUTO: 265 10*3/MM3 (ref 140–450)
PMV BLD AUTO: 11.5 FL (ref 6–12)
POTASSIUM SERPL-SCNC: 4 MMOL/L (ref 3.5–5.2)
PROT SERPL-MCNC: 7.8 G/DL (ref 6–8.5)
PROTHROMBIN TIME: 15.4 SECONDS (ref 11.7–14.2)
RBC # BLD AUTO: 4.85 10*6/MM3 (ref 4.14–5.8)
SODIUM SERPL-SCNC: 139 MMOL/L (ref 136–145)
T4 FREE SERPL-MCNC: 1.19 NG/DL (ref 0.93–1.7)
TROPONIN T SERPL-MCNC: <0.01 NG/ML (ref 0–0.03)
TSH SERPL DL<=0.05 MIU/L-ACNC: 1.89 UIU/ML (ref 0.27–4.2)
WBC # BLD AUTO: 7.31 10*3/MM3 (ref 3.4–10.8)

## 2021-08-07 PROCEDURE — 84443 ASSAY THYROID STIM HORMONE: CPT | Performed by: EMERGENCY MEDICINE

## 2021-08-07 PROCEDURE — 85025 COMPLETE CBC W/AUTO DIFF WBC: CPT | Performed by: EMERGENCY MEDICINE

## 2021-08-07 PROCEDURE — 93005 ELECTROCARDIOGRAM TRACING: CPT | Performed by: EMERGENCY MEDICINE

## 2021-08-07 PROCEDURE — 96375 TX/PRO/DX INJ NEW DRUG ADDON: CPT

## 2021-08-07 PROCEDURE — 25010000002 MAGNESIUM SULFATE 2 GM/50ML SOLUTION: Performed by: EMERGENCY MEDICINE

## 2021-08-07 PROCEDURE — 93010 ELECTROCARDIOGRAM REPORT: CPT | Performed by: INTERNAL MEDICINE

## 2021-08-07 PROCEDURE — 83735 ASSAY OF MAGNESIUM: CPT | Performed by: EMERGENCY MEDICINE

## 2021-08-07 PROCEDURE — 84439 ASSAY OF FREE THYROXINE: CPT | Performed by: EMERGENCY MEDICINE

## 2021-08-07 PROCEDURE — 85610 PROTHROMBIN TIME: CPT | Performed by: EMERGENCY MEDICINE

## 2021-08-07 PROCEDURE — 83880 ASSAY OF NATRIURETIC PEPTIDE: CPT | Performed by: EMERGENCY MEDICINE

## 2021-08-07 PROCEDURE — 84484 ASSAY OF TROPONIN QUANT: CPT | Performed by: EMERGENCY MEDICINE

## 2021-08-07 PROCEDURE — 99283 EMERGENCY DEPT VISIT LOW MDM: CPT

## 2021-08-07 PROCEDURE — 96365 THER/PROPH/DIAG IV INF INIT: CPT

## 2021-08-07 PROCEDURE — 71045 X-RAY EXAM CHEST 1 VIEW: CPT

## 2021-08-07 PROCEDURE — 85730 THROMBOPLASTIN TIME PARTIAL: CPT | Performed by: EMERGENCY MEDICINE

## 2021-08-07 PROCEDURE — 80053 COMPREHEN METABOLIC PANEL: CPT | Performed by: EMERGENCY MEDICINE

## 2021-08-07 PROCEDURE — 93005 ELECTROCARDIOGRAM TRACING: CPT

## 2021-08-07 RX ORDER — DILTIAZEM HYDROCHLORIDE 5 MG/ML
10 INJECTION INTRAVENOUS ONCE
Status: COMPLETED | OUTPATIENT
Start: 2021-08-07 | End: 2021-08-07

## 2021-08-07 RX ORDER — MAGNESIUM SULFATE HEPTAHYDRATE 40 MG/ML
2 INJECTION, SOLUTION INTRAVENOUS ONCE
Status: COMPLETED | OUTPATIENT
Start: 2021-08-07 | End: 2021-08-07

## 2021-08-07 RX ADMIN — SODIUM CHLORIDE 500 ML: 9 INJECTION, SOLUTION INTRAVENOUS at 21:56

## 2021-08-07 RX ADMIN — DILTIAZEM HYDROCHLORIDE 10 MG: 5 INJECTION INTRAVENOUS at 21:54

## 2021-08-07 RX ADMIN — MAGNESIUM SULFATE HEPTAHYDRATE 2 G: 2 INJECTION, SOLUTION INTRAVENOUS at 21:58

## 2021-08-08 VITALS
SYSTOLIC BLOOD PRESSURE: 139 MMHG | OXYGEN SATURATION: 96 % | HEART RATE: 80 BPM | DIASTOLIC BLOOD PRESSURE: 85 MMHG | TEMPERATURE: 98.6 F | RESPIRATION RATE: 18 BRPM

## 2021-08-08 LAB — QT INTERVAL: 331 MS

## 2021-08-08 NOTE — ED PROVIDER NOTES
EMERGENCY DEPARTMENT ENCOUNTER  Patient was placed in face mask in first look and the following protective measures were taken unless additional measures were taken and documented below in the ED course. Patient was wearing facemask when I entered the room and throughout our encounter. I wore full protective equipment throughout this patient encounter including a face mask, and gloves. Hand hygiene was performed before donning protective equipment and after removal when leaving the room.    Room Number:  12/12  Date of encounter:  8/7/2021  PCP: Padmini Jha MD    HPI:  Context: Cuauhtemoc Buck is a 86 y.o. male who presents to the ED c/o chief complaint of tachycardia.  Patient reports history of paroxysmal A. fib, is on medication, followed by cardiology, compliant with his medication, no recent changes.  Patient reports that he had just returned from going out to eat at a restaurant, was taking his nightly blood pressure and heart rate when he noticed that his heart rate was variable, increasing up to 140.  Patient denied being aware of any heart racing, denied any palpitations, no chest pain or shortness of breath.  Patient reports he was asymptomatic.  Patient denies any recent nausea vomiting, no diarrhea, eating drinking well, no fever or systemic symptoms.  Patient reports he is currently at his baseline without complaint.    MEDICAL HISTORY REVIEW  Reviewed in EPIC    PAST MEDICAL HISTORY  Active Ambulatory Problems     Diagnosis Date Noted   • Benign essential hypertension 07/24/2016   • Chronic obstructive pulmonary disease (CMS/HCC) 07/24/2016   • Mixed hyperlipidemia 07/24/2016   • Shoulder joint pain 07/24/2016   • Subclinical hypothyroidism 07/24/2016   • Exertional dyspnea 02/07/2017   • Paroxysmal atrial flutter (CMS/HCC) 05/22/2018   • PAF (paroxysmal atrial fibrillation) (CMS/HCC) 12/31/2020   • PVC (premature ventricular contraction) 12/31/2020     Resolved Ambulatory Problems     Diagnosis  Date Noted   • Hypertension 2018     Past Medical History:   Diagnosis Date   • Abnormal CT scan    • Acute back pain    • Acute sinusitis    • Allergic conjunctivitis    • Allergic rhinitis    • Bronchitis    • COPD (chronic obstructive pulmonary disease) (CMS/HCC)    • TRINH (dyspnea on exertion)    • Dyspnea    • Edema    • Emphysema lung (CMS/HCC)    • Fatigue    • Hyperlipidemia    • EDILMA (obstructive sleep apnea)    • Pain in joint of left shoulder    • Palpitations    • Polyp, sigmoid colon    • Sleep apnea    • SOBOE (shortness of breath on exertion)    • Tachycardia    • Trigger finger        PAST SURGICAL HISTORY  Past Surgical History:   Procedure Laterality Date   • COLONOSCOPY N/A 10/28/2016    Procedure: COLONOSCOPY INTO CECUM WITH POLYPECTOMY X 2;  Surgeon: Carli Khanna MD;  Location: [facility] ENDOSCOPY;  Service:    • CYST REMOVAL         FAMILY HISTORY  Family History   Problem Relation Age of Onset   • Heart disease Mother    • Diabetes Mother    • Hypertension Mother    • Stroke Mother    • Cancer Father    • Hypertension Father    • Heart disease Brother        SOCIAL HISTORY  Social History     Socioeconomic History   • Marital status:      Spouse name: Not on file   • Number of children: Not on file   • Years of education: Not on file   • Highest education level: Not on file   Tobacco Use   • Smoking status: Former Smoker     Packs/day: 1.50     Years: 35.00     Pack years: 52.50     Types: Cigarettes     Quit date:      Years since quittin.6   • Smokeless tobacco: Never Used   • Tobacco comment: caffeine use   Substance and Sexual Activity   • Alcohol use: Not Currently     Comment: 5-7 beer/liquor drinks a week   • Drug use: No   • Sexual activity: Defer       ALLERGIES  Patient has no known allergies.    The patient's allergies have been reviewed    REVIEW OF SYSTEMS  All systems reviewed and negative except for those discussed in HPI.     PHYSICAL EXAM  I have  reviewed the triage vital signs and nursing notes.  ED Triage Vitals   Temp Heart Rate Resp BP SpO2   08/07/21 2121 08/07/21 2121 08/07/21 2121 08/07/21 2139 08/07/21 2121   98.6 °F (37 °C) (!) 128 18 125/87 92 %      Temp src Heart Rate Source Patient Position BP Location FiO2 (%)   08/07/21 2121 08/07/21 2121 -- -- --   Tympanic Monitor          General: No acute distress.  HENT: NCAT, PERRL, Nares patent.  Eyes: no scleral icterus.  Neck: trachea midline, no ROM limitations.  CV: Irregularly irregular  Respiratory: normal effort, CTAB.  Abdomen: soft, nondistended, NTTP, no rebound tenderness, no guarding or rigidity  : deferred.  Musculoskeletal: no deformity.  Neuro: alert, moves all extremities, follows commands.  Skin: warm, dry.    LAB RESULTS  Recent Results (from the past 24 hour(s))   ECG 12 Lead    Collection Time: 08/07/21  9:26 PM   Result Value Ref Range    QT Interval 331 ms   Comprehensive Metabolic Panel    Collection Time: 08/07/21  9:46 PM    Specimen: Blood   Result Value Ref Range    Glucose 98 65 - 99 mg/dL    BUN 13 8 - 23 mg/dL    Creatinine 0.78 0.76 - 1.27 mg/dL    Sodium 139 136 - 145 mmol/L    Potassium 4.0 3.5 - 5.2 mmol/L    Chloride 105 98 - 107 mmol/L    CO2 25.5 22.0 - 29.0 mmol/L    Calcium 9.4 8.6 - 10.5 mg/dL    Total Protein 7.8 6.0 - 8.5 g/dL    Albumin 4.10 3.50 - 5.20 g/dL    ALT (SGPT) 28 1 - 41 U/L    AST (SGOT) 35 1 - 40 U/L    Alkaline Phosphatase 68 39 - 117 U/L    Total Bilirubin 0.5 0.0 - 1.2 mg/dL    eGFR Non African Amer 95 >60 mL/min/1.73    Globulin 3.7 gm/dL    A/G Ratio 1.1 g/dL    BUN/Creatinine Ratio 16.7 7.0 - 25.0    Anion Gap 8.5 5.0 - 15.0 mmol/L   Troponin    Collection Time: 08/07/21  9:46 PM    Specimen: Blood   Result Value Ref Range    Troponin T <0.010 0.000 - 0.030 ng/mL   BNP    Collection Time: 08/07/21  9:46 PM    Specimen: Blood   Result Value Ref Range    proBNP 598.9 0.0-1,800.0 pg/mL   T4, Free    Collection Time: 08/07/21  9:46 PM     Specimen: Blood   Result Value Ref Range    Free T4 1.19 0.93 - 1.70 ng/dL   TSH    Collection Time: 08/07/21  9:46 PM    Specimen: Blood   Result Value Ref Range    TSH 1.890 0.270 - 4.200 uIU/mL   Magnesium    Collection Time: 08/07/21  9:46 PM    Specimen: Blood   Result Value Ref Range    Magnesium 2.3 1.6 - 2.4 mg/dL   Protime-INR    Collection Time: 08/07/21  9:46 PM    Specimen: Blood   Result Value Ref Range    Protime 15.4 (H) 11.7 - 14.2 Seconds    INR 1.24 (H) 0.90 - 1.10   aPTT    Collection Time: 08/07/21  9:46 PM    Specimen: Blood   Result Value Ref Range    PTT 37.2 (H) 22.7 - 35.4 seconds   CBC Auto Differential    Collection Time: 08/07/21  9:46 PM    Specimen: Blood   Result Value Ref Range    WBC 7.31 3.40 - 10.80 10*3/mm3    RBC 4.85 4.14 - 5.80 10*6/mm3    Hemoglobin 15.4 13.0 - 17.7 g/dL    Hematocrit 48.2 37.5 - 51.0 %    MCV 99.4 (H) 79.0 - 97.0 fL    MCH 31.8 26.6 - 33.0 pg    MCHC 32.0 31.5 - 35.7 g/dL    RDW 13.1 12.3 - 15.4 %    RDW-SD 48.4 37.0 - 54.0 fl    MPV 11.5 6.0 - 12.0 fL    Platelets 265 140 - 450 10*3/mm3    Neutrophil % 55.5 42.7 - 76.0 %    Lymphocyte % 24.4 19.6 - 45.3 %    Monocyte % 14.5 (H) 5.0 - 12.0 %    Eosinophil % 4.9 0.3 - 6.2 %    Basophil % 0.4 0.0 - 1.5 %    Immature Grans % 0.3 0.0 - 0.5 %    Neutrophils, Absolute 4.06 1.70 - 7.00 10*3/mm3    Lymphocytes, Absolute 1.78 0.70 - 3.10 10*3/mm3    Monocytes, Absolute 1.06 (H) 0.10 - 0.90 10*3/mm3    Eosinophils, Absolute 0.36 0.00 - 0.40 10*3/mm3    Basophils, Absolute 0.03 0.00 - 0.20 10*3/mm3    Immature Grans, Absolute 0.02 0.00 - 0.05 10*3/mm3    nRBC 0.0 0.0 - 0.2 /100 WBC       I ordered the above labs and reviewed the results.    RADIOLOGY  XR Chest 1 View    Result Date: 8/7/2021  SINGLE VIEW OF THE CHEST  HISTORY: Palpitations  COMPARISON: 12/23/2020  FINDINGS: Heart size is within normal limits for technique. There are background emphysematous changes. There is calcification of the aorta. Areas of chronic  scarring are noted at the lung bases bilaterally. No pneumothorax or definite pleural effusion is seen. No definite acute infiltrates are noted.      No acute findings.  This report was finalized on 8/7/2021 9:51 PM by Dr. Kesha Ga M.D.        I ordered the above noted radiological studies. I reviewed the images and results. I agree with the radiologist interpretation.    PROCEDURES  Procedures    MEDICATIONS GIVEN IN ER  Medications   sodium chloride 0.9 % bolus 500 mL (500 mL Intravenous New Bag 8/7/21 2156)   dilTIAZem (CARDIZEM) injection 10 mg (10 mg Intravenous Given 8/7/21 2154)   magnesium sulfate 2g/50 mL (PREMIX) infusion (2 g Intravenous New Bag 8/7/21 2158)       PROGRESS, DATA ANALYSIS, CONSULTS, AND MEDICAL DECISION MAKING  A complete history and physical exam have been performed.  All available laboratory and imaging results have been reviewed by myself prior to disposition.    MDM  After the initial H&P, I discussed pertinent information from history and physical exam with patient/family.  Discussed differential diagnosis.  Discussed plan for ED evaluation/work-up/treatment.  All questions answered.  Patient/family is agreeable with plan.  ED Course as of Aug 07 2234   Sat Aug 07, 2021   2135 EKG independently viewed and contemporaneously interpreted by ED physician. Time: 2126.  Rate 110.  Interpretation: Atrial fibrillation, no RVR, normal axis, delayed R wave progression, nonspecific ST changes, single PVC.    [JG]   2147 My differential diagnosis for palpitations includes but is not limited to    Arrhythmias  Atrial fibrillation/flutter  Bradycardia caused by advanced arteriovenous  block or sinus node dysfunction  Bradycardia-tachycardia syndrome (sick sinus syndrome)  Multifocal atrial tachycardia  Premature supraventricular or ventricular contractions  Sinus tachycardia or arrhythmia  Supraventricular tachycardia  Ventricular tachycardia  Jovon-Parkinson-White syndrome     Psychiatric  causes  Anxiety disorder  Panic attacks  Drugs and medications  Alcohol  Caffeine  Certain prescription and over-the-counter agents (e.g., digitalis, phenothiazine, theophylline, beta agonists)  Street drugs (e.g., cocaine)  Tobacco    Nonarrhythmic cardiac causes  Atrial or ventricular septal defect  Cardiomyopathy  Congenital heart disease  Congestive heart failure  Mitral valve prolapse  Pacemaker-mediated tachycardia  Pericarditis  Valvular disease (e.g., aortic insufficiency, stenosis)    Extracardiac causes  Anemia  Electrolyte imbalance  Fever  Hyperthyroidism  Hypoglycemia  Hypovolemia  Pheochromocytoma  Pulmonary disease  Vasovagal syndrome             [JG]   2232 Patient presented with A. fib RVR, RVR resolved after single dose of diltiazem.  Patient has remained on the monitor with heart rate at approximately 80 beats a minute.  Patient is normotensive.  ED work-up otherwise unremarkable.  Patient is well-appearing and remains asymptomatic throughout his ED stay.  Given extensive discussion return precautions, discussed need for close follow-up with primary care and cardiology, discharging.    [JG]      ED Course User Index  [JG] Immanuel Pearson MD       AS OF 22:34 EDT VITALS:    BP - 130/72  HR - 73  TEMP - 98.6 °F (37 °C) (Tympanic)  O2 SATS - 95%    DIAGNOSIS  Final diagnoses:   Atrial fibrillation, unspecified type (CMS/HCC)   Tachycardia         DISPOSITION  DISCHARGE    Patient discharged in stable condition.    Reviewed implications of results, diagnosis, meds, responsibility to follow up, warning signs and symptoms of possible worsening, potential complications and reasons to return to ER.    Patient/Family voiced understanding of above instructions.    Discussed plan for discharge, as there is no emergent indication for admission. Patient referred to primary care provider for BP management due to today's BP. Pt/family is agreeable and understands need for follow up and repeat testing.  Pt is  aware that discharge does not mean that nothing is wrong but it indicates no emergency is present that requires admission and they must continue care with follow-up as given below or physician of their choice.     FOLLOW-UP  Padmini Jha MD  [address] [facility] [address] [facility] [address]  [place] [address]  [phone]    Schedule an appointment as soon as possible for a visit in 2 days  even if well    [facility]  [address] [facility] [address]  [facility] [id number] [phone]  Schedule an appointment as soon as possible for a visit in 2 days           Medication List      No changes were made to your prescriptions during this visit.          Immanuel Pearson MD  08/07/21 4780

## 2021-08-08 NOTE — ED NOTES
"pt to ER via PV from home c/o palpitations states, \" my Afib is acting up. my heart rate goes from 45 to 125\" pt called cardiologist nurse and she told him to come to the ER   Pt in mask in triage  Triage in appropriate PPE     Ophelia Phillips RN  08/07/21 2120    "

## 2021-08-09 ENCOUNTER — PATIENT OUTREACH (OUTPATIENT)
Dept: CASE MANAGEMENT | Facility: OTHER | Age: 86
End: 2021-08-09

## 2021-08-09 DIAGNOSIS — I49.3 PVC (PREMATURE VENTRICULAR CONTRACTION): ICD-10-CM

## 2021-08-09 DIAGNOSIS — I48.0 PAF (PAROXYSMAL ATRIAL FIBRILLATION) (HCC): ICD-10-CM

## 2021-08-09 DIAGNOSIS — I10 BENIGN ESSENTIAL HYPERTENSION: ICD-10-CM

## 2021-08-09 RX ORDER — LEVOTHYROXINE SODIUM 0.05 MG/1
TABLET ORAL
Qty: 90 TABLET | Refills: 3 | Status: SHIPPED | OUTPATIENT
Start: 2021-08-09

## 2021-08-09 RX ORDER — DOXAZOSIN 2 MG/1
TABLET ORAL
Qty: 30 TABLET | Refills: 0 | Status: SHIPPED | OUTPATIENT
Start: 2021-08-09 | End: 2021-09-02 | Stop reason: ALTCHOICE

## 2021-08-09 NOTE — OUTREACH NOTE
Patient Outreach    Ambulatory Case Management Note    Call placed to pt to FU ED visit. Pt states he is doing well. He has been checking his HR and BP frequently and has had no issues. He has called and scheduled FU with his PCP and called cardiology. No questions, concerns or needs regarding health wellness. Pt given number for 24/7 hotliine. Pt appreciative of phone call and declined to participate in  Case Management Program. No needs identified.  Pt does have AWV scheduled.         Shell Carmona RN  Ambulatory Case Management    8/9/2021, 12:21 EDT

## 2021-08-10 ENCOUNTER — TELEPHONE (OUTPATIENT)
Dept: CARDIOLOGY | Facility: CLINIC | Age: 86
End: 2021-08-10

## 2021-08-10 NOTE — TELEPHONE ENCOUNTER
Patient called to request samples of Eliquis.  He takes 5 mg bid.  I called and left a message for patient that I have 2.5 mg and that he will have to take 2 tablets by mouth twice daily to equal the 5 mg tablet dose. This was written and highlighted in orange on his white sample bag as well. / KAYLAN

## 2021-08-16 ENCOUNTER — OFFICE VISIT (OUTPATIENT)
Dept: INTERNAL MEDICINE | Facility: CLINIC | Age: 86
End: 2021-08-16

## 2021-08-16 VITALS
BODY MASS INDEX: 26.92 KG/M2 | DIASTOLIC BLOOD PRESSURE: 84 MMHG | HEART RATE: 59 BPM | WEIGHT: 188 LBS | HEIGHT: 70 IN | SYSTOLIC BLOOD PRESSURE: 130 MMHG

## 2021-08-16 DIAGNOSIS — I10 BENIGN ESSENTIAL HYPERTENSION: ICD-10-CM

## 2021-08-16 DIAGNOSIS — I48.0 PAF (PAROXYSMAL ATRIAL FIBRILLATION) (HCC): Primary | ICD-10-CM

## 2021-08-16 DIAGNOSIS — J44.9 CHRONIC OBSTRUCTIVE PULMONARY DISEASE, UNSPECIFIED COPD TYPE (HCC): ICD-10-CM

## 2021-08-16 PROCEDURE — 99214 OFFICE O/P EST MOD 30 MIN: CPT | Performed by: INTERNAL MEDICINE

## 2021-08-16 NOTE — PROGRESS NOTES
"Chief Complaint   Patient presents with   • Hospital Follow Up Visit   • Atrial Fibrillation   • COPD       History of Present Illness   Cuauhtemoc Buck is a 86 y.o. male presents for hospital follow up. Patient with atrial fibrillation. He was into the hospital w/ increased rate (reports varied  that day). He is rx cardizem 30 mg to take tid. Often forgets mid day dosing. He occasionally has lower bp in the evenings. He is on eliquis as well. No recent bleeding events.   Has oxygen dependent COPD. He notes that he has had some decline but in general in regards to breathing \"it's doing better\".   Patient is moving to [place] in the near future.    Cbc, tsh, free t4, cmp all wnl. Denies any palpitation or chest pain at that time.     The following portions of the patient's history were reviewed and updated as appropriate: allergies, current medications, past family history, past medical history, past social history, past surgical history and problem list.  Current Outpatient Medications on File Prior to Visit   Medication Sig Dispense Refill   • albuterol sulfate  (90 Base) MCG/ACT inhaler INHALE TWO PUFFS BY MOUTH EVERY 6 HOURS AS NEEDED FOR WHEEZING 18 g 4   • apixaban (Eliquis) 5 MG tablet tablet Take 1 tablet by mouth Every 12 (Twelve) Hours. 28 tablet 0   • atorvastatin (LIPITOR) 20 MG tablet TAKE ONE TABLET BY MOUTH DAILY 90 tablet 3   • dilTIAZem (CARDIZEM) 30 MG tablet TAKE ONE TABLET BY MOUTH THREE TIMES A  tablet 3   • doxazosin (CARDURA) 2 MG tablet TAKE ONE TABLET BY MOUTH ONCE NIGHTLY 30 tablet 0   • fluticasone (FLONASE) 50 MCG/ACT nasal spray 2 sprays into the nostril(s) as directed by provider Daily.     • fluticasone-salmeterol (ADVAIR) 250-50 MCG/DOSE DISKUS INHALE ONE PUFF BY MOUTH TWICE A DAY 60 each 5   • furosemide (LASIX) 20 MG tablet Take 2 tablets by mouth 2 (Two) Times a Day. 360 tablet 3   • ipratropium-albuterol (DUO-NEB) 0.5-2.5 mg/3 ml nebulizer INHALE THE CONTENT OF " ONE VIAL VIA NEBULIZER EVERY 4 HOURS AS NEEDED FOR WHEEZING 90 mL 3   • levothyroxine (SYNTHROID, LEVOTHROID) 50 MCG tablet TAKE ONE TABLET BY MOUTH DAILY 90 tablet 3   • losartan (COZAAR) 50 MG tablet Take 1 tablet by mouth Daily. 90 tablet 3   • Magnesium 250 MG tablet Take  by mouth Daily.     • Misc Natural Products (JOINT SUPPORT COMPLEX PO) Take  by mouth.     • nystatin (MYCOSTATIN) 291540 UNIT/ML suspension Swish and swallow 5 mL 4 (Four) Times a Day. 400 mL 1   • O2 (OXYGEN) Inhale 1 (One) Time. 2.5-3 liters     • solifenacin (VESICARE) 5 MG tablet Take 1 tablet by mouth Daily. 90 tablet 3   • Spiriva HandiHaler 18 MCG per inhalation capsule INHALE THE ENTIRE CONTENTS OF 1 CAPSULE ONCE A DAY USING HANDIHALER DEVICE 30 capsule 2   • vitamin B-12 (CYANOCOBALAMIN) 100 MCG tablet Take  by mouth Daily.     • vitamin D3 125 MCG (5000 UT) capsule capsule Take 5,000 Units by mouth Daily.       Current Facility-Administered Medications on File Prior to Visit   Medication Dose Route Frequency Provider Last Rate Last Admin   • levalbuterol (XOPENEX) nebulizer solution 0.63 mg  0.63 mg Nebulization Q6H PRN Padmini Jha MD   0.63 mg at 11/01/19 1503     Review of Systems   Constitutional: Negative.    HENT: Negative.    Eyes: Negative.    Respiratory: Positive for shortness of breath.         Stable copd     Cardiovascular: Negative.  Negative for chest pain, palpitations and leg swelling.   Gastrointestinal: Negative.    Endocrine: Negative.    Genitourinary: Negative.    Musculoskeletal: Positive for arthralgias.   Skin: Negative.    Allergic/Immunologic: Negative.    Neurological: Negative.    Hematological: Negative.    Psychiatric/Behavioral: Negative.        Objective   Physical Exam  Vitals and nursing note reviewed.   Constitutional:       Appearance: Normal appearance.   HENT:      Head: Normocephalic and atraumatic.      Right Ear: Tympanic membrane normal.      Left Ear: Tympanic membrane normal.       "Nose: Nose normal.      Mouth/Throat:      Mouth: Mucous membranes are moist.   Eyes:      Extraocular Movements: Extraocular movements intact.      Pupils: Pupils are equal, round, and reactive to light.   Cardiovascular:      Rate and Rhythm: Normal rate. Rhythm irregular.      Pulses: Normal pulses.      Heart sounds: Normal heart sounds.   Pulmonary:      Effort: Pulmonary effort is normal.      Breath sounds: Normal breath sounds.   Abdominal:      General: Abdomen is flat.      Palpations: Abdomen is soft.   Musculoskeletal:         General: Normal range of motion.      Cervical back: Normal range of motion.   Skin:     General: Skin is warm and dry.   Neurological:      General: No focal deficit present.      Mental Status: He is alert and oriented to person, place, and time.   Psychiatric:         Mood and Affect: Mood normal.         Behavior: Behavior normal.         Thought Content: Thought content normal.         Judgment: Judgment normal.          /84   Pulse 59   Ht 177.8 cm (70\")   Wt 85.3 kg (188 lb)   BMI 26.98 kg/m²     Assessment/Plan   Diagnoses and all orders for this visit:    PAF (paroxysmal atrial fibrillation) (CMS/HCC)    Chronic obstructive pulmonary disease, unspecified COPD type (CMS/HCC)    Benign essential hypertension      Reviewed er record and labs. Patient w/ PAF. He is rate controlled and anticoagulated today. He will switch cardizem to 60 mg am and 30 mg hs given difficulty w/ mid day dosing. He has extra tabs available at all times and may take if heart rate exceeds 100 bpm. To continue all inhalers and O2 for copd. He is going to be transferring care when he moves and will schedule w/ specialists in [place] and f/u routinely.            "

## 2021-08-30 ENCOUNTER — TELEPHONE (OUTPATIENT)
Dept: INTERNAL MEDICINE | Facility: CLINIC | Age: 86
End: 2021-08-30

## 2021-08-30 RX ORDER — LOSARTAN POTASSIUM 50 MG/1
TABLET ORAL
Qty: 90 TABLET | Refills: 3 | Status: SHIPPED | OUTPATIENT
Start: 2021-08-30 | End: 2021-10-12

## 2021-08-30 NOTE — TELEPHONE ENCOUNTER
Caller: Cuauhtemoc Buck    Relationship: Self    Best call back number: [phone]    Who are you requesting to speak with (clinical staff, provider,  specific staff member): DWIGHT    What was the call regarding: THE PATIENT STATES THAT HE HAS BEEN TOLD BY HIS UROLOGIST AT [facility] THAT HE WOULD HAVE TO HAVE A PROCEDURE WHERE THEY WOULD HAVE TO INSERT A CATHETER IN HIS PENIS AND HE ISN'T COMFORTABLE WITH IT    Do you require a callback: YES

## 2021-09-02 ENCOUNTER — OFFICE VISIT (OUTPATIENT)
Dept: CARDIOLOGY | Facility: CLINIC | Age: 86
End: 2021-09-02

## 2021-09-02 VITALS
SYSTOLIC BLOOD PRESSURE: 110 MMHG | BODY MASS INDEX: 26.63 KG/M2 | HEART RATE: 59 BPM | HEIGHT: 70 IN | DIASTOLIC BLOOD PRESSURE: 52 MMHG | WEIGHT: 186 LBS

## 2021-09-02 DIAGNOSIS — J44.9 CHRONIC OBSTRUCTIVE PULMONARY DISEASE, UNSPECIFIED COPD TYPE (HCC): ICD-10-CM

## 2021-09-02 DIAGNOSIS — I49.3 PVC (PREMATURE VENTRICULAR CONTRACTION): ICD-10-CM

## 2021-09-02 DIAGNOSIS — E78.2 MIXED HYPERLIPIDEMIA: ICD-10-CM

## 2021-09-02 DIAGNOSIS — I48.0 PAF (PAROXYSMAL ATRIAL FIBRILLATION) (HCC): Primary | ICD-10-CM

## 2021-09-02 PROCEDURE — 99214 OFFICE O/P EST MOD 30 MIN: CPT | Performed by: NURSE PRACTITIONER

## 2021-09-02 PROCEDURE — 93000 ELECTROCARDIOGRAM COMPLETE: CPT | Performed by: NURSE PRACTITIONER

## 2021-09-02 NOTE — PROGRESS NOTES
Date of Office Visit: 21  Encounter Provider: ADAN Quick  Place of Service: [facility] CARDIOLOGY  Patient Name: Cuauhtemoc Buck  :1935    Chief Complaint   Patient presents with   • PAF (paroxysmal atrial fibrillation)   • Hypertension   • Hyperlipidemia   • Follow-up   :     HPI: Cuauhtemoc Buck is a 86 y.o. male  with history of hypertension, hyperlipidemia, paroxysmal atrial flutter/fibrillation, COPD and emphysema.  He is followed by Dr. Trejo.  I will visit with him in follow up today and have reviewed his medical record.         He was referred by Dr. Padmini Jha for dyspnea.  He had an episode of tachycardia and in May 2013 had an echo which showed normal left ventricular systolic function with an ejection fraction of 66%, normal diastolic function no significant valvular disease.  He had a Lexiscan perfusion stress test which showed no evidence of ischemia.  He wore 24-hour Holter monitor which was normal.  In May 2018, he had an echocardiogram which showed normal left ventricular systolic function, trace tricuspid regurgitation, RVSP 37 mmHg.  He had a benign 24-hour Holter monitor.  Vascular screening showed normal abdominal aorta, no peripheral arterial disease and mild plaque without stenosis in the carotid arteries.  He was seen in the cardiac evaluation clinic May 2018 with an episode of atrial flutter.  He converted back to normal sinus rhythm spontaneously and was started on diltiazem.  He was not anticoagulated.  He wore a 2-week mobile telemetry device 2019 which showed predominant rhythm sinus with first-degree AV block.  There were frequent short burst of SVT showing sinus tachycardia at 110 bpm.  There were isolated ventricular ectopics 1.6% of the time.  There was no atrial fibrillation or flutter.  Follow-up echo 2021 showed normal left ventricular systolic function, moderate mitral annular calcification, mildly elevated RVSP at 40  "mmHg    In May 2021 he had some atypical chest pain that resolved after taking short course of naproxen.  This was felt to be musculoskeletal and not cardiac in nature.    He had an emergency department visit August 2021.  He actually was not feeling poorly but noticed fluctuations in his heart rate.  He was found to be in atrial fibrillation and responded well to IV diltiazem.  He was discharged.  He presents today and states his blood pressure and pulse rate readings have been stable.  He plans to move to [place] but keep his home here in [place].  He stays active.  He does pull-ups and treadmill.  He has no exertional symptoms at this time.      No Known Allergies        Family and social history reviewed.     ROS  All other systems were reviewed and are negative          Objective:     Vitals:    09/02/21 1406   BP: 110/52   BP Location: Left arm   Patient Position: Sitting   Pulse: 59   Weight: 84.4 kg (186 lb)   Height: 177.8 cm (70\")     Body mass index is 26.69 kg/m².    PHYSICAL EXAM:  Constitutional:       General: Not in acute distress.     Appearance: Well-developed. Not diaphoretic.   HENT:      Head: Normocephalic.   Pulmonary:      Effort: Pulmonary effort is normal. No respiratory distress.      Breath sounds: Normal breath sounds. No wheezing. No rhonchi. No rales.   Cardiovascular:      Normal rate. Regular rhythm.   Pulses:     Radial: 2+ bilaterally.  Skin:     General: Skin is warm and dry. There is no cyanosis.      Findings: No rash.   Neurological:      Mental Status: Alert and oriented to person, place, and time.   Psychiatric:         Behavior: Behavior normal.         Thought Content: Thought content normal.         Judgment: Judgment normal.           ECG 12 Lead    Date/Time: 9/2/2021 5:51 PM  Performed by: Brianne Peña APRN  Authorized by: Brianne Peña APRN   Comparison: compared with previous ECG   Similar to previous ECG  Rhythm: sinus rhythm  Rate: normal  ST Segments: ST " segments normal  T Waves: T waves normal  QRS axis: normal                Current Outpatient Medications   Medication Sig Dispense Refill   • albuterol sulfate  (90 Base) MCG/ACT inhaler INHALE TWO PUFFS BY MOUTH EVERY 6 HOURS AS NEEDED FOR WHEEZING 18 g 4   • apixaban (Eliquis) 5 MG tablet tablet Take 1 tablet by mouth Every 12 (Twelve) Hours. 28 tablet 0   • atorvastatin (LIPITOR) 20 MG tablet TAKE ONE TABLET BY MOUTH DAILY 90 tablet 3   • dilTIAZem (CARDIZEM) 30 MG tablet TAKE ONE TABLET BY MOUTH THREE TIMES A  tablet 3   • fluticasone (FLONASE) 50 MCG/ACT nasal spray 2 sprays into the nostril(s) as directed by provider Daily.     • fluticasone-salmeterol (ADVAIR) 250-50 MCG/DOSE DISKUS INHALE ONE PUFF BY MOUTH TWICE A DAY (Patient taking differently: As Needed.) 60 each 5   • furosemide (LASIX) 20 MG tablet Take 2 tablets by mouth 2 (Two) Times a Day. (Patient taking differently: Take two tablets every morning and one tablet every evening) 360 tablet 3   • ipratropium-albuterol (DUO-NEB) 0.5-2.5 mg/3 ml nebulizer INHALE THE CONTENT OF ONE VIAL VIA NEBULIZER EVERY 4 HOURS AS NEEDED FOR WHEEZING 90 mL 3   • levothyroxine (SYNTHROID, LEVOTHROID) 50 MCG tablet TAKE ONE TABLET BY MOUTH DAILY 90 tablet 3   • losartan (COZAAR) 50 MG tablet TAKE ONE TABLET BY MOUTH DAILY 90 tablet 3   • Magnesium 250 MG tablet Take  by mouth Daily.     • O2 (OXYGEN) Inhale 1 (One) Time. 2.5-3 liters     • Spiriva HandiHaler 18 MCG per inhalation capsule INHALE THE ENTIRE CONTENTS OF 1 CAPSULE ONCE A DAY USING HANDIHALER DEVICE 30 capsule 2   • vitamin B-12 (CYANOCOBALAMIN) 100 MCG tablet Take  by mouth Daily.     • doxazosin (CARDURA) 2 MG tablet TAKE ONE TABLET BY MOUTH ONCE NIGHTLY 30 tablet 0   • Misc Natural Products (JOINT SUPPORT COMPLEX PO) Take  by mouth.     • nystatin (MYCOSTATIN) 086686 UNIT/ML suspension Swish and swallow 5 mL 4 (Four) Times a Day. 400 mL 1   • solifenacin (VESICARE) 5 MG tablet Take 1 tablet  by mouth Daily. 90 tablet 3   • vitamin D3 125 MCG (5000 UT) capsule capsule Take 5,000 Units by mouth Daily.       Current Facility-Administered Medications   Medication Dose Route Frequency Provider Last Rate Last Admin   • levalbuterol (XOPENEX) nebulizer solution 0.63 mg  0.63 mg Nebulization Q6H PRN Padmini Jah MD   0.63 mg at 11/01/19 1503     Assessment:      No diagnosis found.     No orders of the defined types were placed in this encounter.        Plan:       1.  86-year-old gentleman with paroxysmal atrial fibrillation.  He is maintained on diltiazem.  He is anticoagulated with Eliquis.  He is in normal sinus rhythm today and checks his blood pressure and pulse rate routinely at home.  2.  Hypertension blood pressure appears well controlled, he rarely takes an additional dose of diltiazem for systolic blood pressure reading greater than 150  3.  Hyperlipidemia on atorvastatin 20 mg, continue diet and exercise.  4.  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  He follows with Dr. Hale and has follow up approx 01/2021.  .   5.  Hypothyroidism on replacement therapy, will defer treatment and levels to GP.  6.  Obstructive sleep apnea reports compliance with CPAP  7.  Carotid artery plaque in the left no stenosis on duplex July 2020    Follow-up in 1 year with Dr. Trejo          It has been a pleasure to participate in this patient's care.      Thank you,  ADAN Quick      **I used Dragon to dictate this note:**

## 2021-10-01 RX ORDER — ATORVASTATIN CALCIUM 20 MG/1
TABLET, FILM COATED ORAL
Qty: 90 TABLET | Refills: 3 | Status: SHIPPED | OUTPATIENT
Start: 2021-10-01

## 2021-10-12 ENCOUNTER — OFFICE VISIT (OUTPATIENT)
Dept: INTERNAL MEDICINE | Facility: CLINIC | Age: 86
End: 2021-10-12

## 2021-10-12 VITALS
SYSTOLIC BLOOD PRESSURE: 118 MMHG | WEIGHT: 188 LBS | DIASTOLIC BLOOD PRESSURE: 58 MMHG | HEART RATE: 73 BPM | HEIGHT: 70 IN | BODY MASS INDEX: 26.92 KG/M2

## 2021-10-12 DIAGNOSIS — I10 BENIGN ESSENTIAL HYPERTENSION: Primary | ICD-10-CM

## 2021-10-12 DIAGNOSIS — I48.0 PAF (PAROXYSMAL ATRIAL FIBRILLATION) (HCC): ICD-10-CM

## 2021-10-12 DIAGNOSIS — E03.9 HYPOTHYROIDISM, UNSPECIFIED TYPE: ICD-10-CM

## 2021-10-12 DIAGNOSIS — E78.2 MIXED HYPERLIPIDEMIA: ICD-10-CM

## 2021-10-12 DIAGNOSIS — J44.9 CHRONIC OBSTRUCTIVE PULMONARY DISEASE, UNSPECIFIED COPD TYPE (HCC): ICD-10-CM

## 2021-10-12 PROCEDURE — 99214 OFFICE O/P EST MOD 30 MIN: CPT | Performed by: INTERNAL MEDICINE

## 2021-10-12 RX ORDER — TAMSULOSIN HYDROCHLORIDE 0.4 MG/1
1 CAPSULE ORAL DAILY
COMMUNITY

## 2021-10-12 RX ORDER — LOSARTAN POTASSIUM 25 MG/1
25 TABLET ORAL DAILY
Qty: 90 TABLET | Refills: 1 | Status: SHIPPED | OUTPATIENT
Start: 2021-10-12

## 2021-10-12 NOTE — PROGRESS NOTES
Chief Complaint   Patient presents with   • Hypertension   • Hyperlipidemia   • Hypothyroidism   • Atrial Fibrillation       History of Present Illness   Cuauhtemoc Buck is a 86 y.o. male presents for follow up evaluation w/ acute needs. Patient is moving to [place]. He notes that he breathes better when he is down there. Living near his son.   Has acute left side low back pain. He has been packing and moving his items at this time.   Has atrial flutter. He is rate controlled on cardizem and anticoagulated w/ eliquis. He is noted to have low bp at this time.   Has hypothyroidism. tsh and free t4 wnl when tested in august.   Has hyperlipidemia. Lipids from June reviewed and he is at goal for cad.         The following portions of the patient's history were reviewed and updated as appropriate: allergies, current medications, past family history, past medical history, past social history, past surgical history and problem list.  Current Outpatient Medications on File Prior to Visit   Medication Sig Dispense Refill   • albuterol sulfate  (90 Base) MCG/ACT inhaler INHALE TWO PUFFS BY MOUTH EVERY 6 HOURS AS NEEDED FOR WHEEZING 18 g 4   • apixaban (Eliquis) 5 MG tablet tablet Take 1 tablet by mouth Every 12 (Twelve) Hours. 28 tablet 0   • atorvastatin (LIPITOR) 20 MG tablet TAKE 1 TABLET EVERY DAY 90 tablet 3   • dilTIAZem (CARDIZEM) 30 MG tablet TAKE ONE TABLET BY MOUTH THREE TIMES A  tablet 3   • fluticasone (FLONASE) 50 MCG/ACT nasal spray 2 sprays into the nostril(s) as directed by provider Daily.     • fluticasone-salmeterol (ADVAIR) 250-50 MCG/DOSE DISKUS Inhale 1 puff 2 (Two) Times a Day. 60 each 5   • furosemide (LASIX) 20 MG tablet Take 2 tablets by mouth 2 (Two) Times a Day. (Patient taking differently: Take two tablets every morning and one tablet every evening) 360 tablet 3   • ipratropium-albuterol (DUO-NEB) 0.5-2.5 mg/3 ml nebulizer INHALE THE CONTENT OF ONE VIAL VIA NEBULIZER EVERY 4 HOURS AS  "NEEDED FOR WHEEZING 90 mL 3   • levothyroxine (SYNTHROID, LEVOTHROID) 50 MCG tablet TAKE ONE TABLET BY MOUTH DAILY 90 tablet 3   • O2 (OXYGEN) Inhale 1 (One) Time. 2.5-3 liters     • Spiriva HandiHaler 18 MCG per inhalation capsule INHALE THE ENTIRE CONTENTS OF 1 CAPSULE ONCE A DAY USING HANDIHALER DEVICE 30 capsule 2   • tamsulosin (FLOMAX) 0.4 MG capsule 24 hr capsule Take 1 capsule by mouth Daily.     • vitamin B-12 (CYANOCOBALAMIN) 100 MCG tablet Take  by mouth Daily.     • [DISCONTINUED] losartan (COZAAR) 50 MG tablet TAKE ONE TABLET BY MOUTH DAILY 90 tablet 3   • [DISCONTINUED] Magnesium 250 MG tablet Take  by mouth Daily.       Current Facility-Administered Medications on File Prior to Visit   Medication Dose Route Frequency Provider Last Rate Last Admin   • levalbuterol (XOPENEX) nebulizer solution 0.63 mg  0.63 mg Nebulization Q6H PRN Padmini Jha MD   0.63 mg at 11/01/19 1503     Review of Systems   Constitutional: Negative.    HENT: Negative.         Chronic hearing impairment  \"they can't get my glasses or hearing aids right\".    Eyes: Negative.    Respiratory:        Stable breathing. \"my breathing is great\". He is on 2L per nc at this time.    Cardiovascular: Negative.    Gastrointestinal: Negative.    Endocrine: Negative.    Genitourinary: Negative.         Continued urinary frequency w/ 2-3 times a night nocturia     Musculoskeletal: Positive for back pain.   Skin: Negative.    Allergic/Immunologic: Negative.    Neurological: Negative.    Hematological: Negative.    Psychiatric/Behavioral: Negative.        Objective   Physical Exam  Vitals and nursing note reviewed.   Constitutional:       Appearance: He is well-developed.   HENT:      Head: Normocephalic and atraumatic.      Right Ear: External ear normal.      Left Ear: External ear normal.      Nose: Nose normal.   Eyes:      Pupils: Pupils are equal, round, and reactive to light.   Cardiovascular:      Rate and Rhythm: Normal rate and regular " "rhythm.      Heart sounds: Normal heart sounds.   Pulmonary:      Effort: Pulmonary effort is normal. No respiratory distress.      Breath sounds: Normal breath sounds.   Abdominal:      Palpations: Abdomen is soft.   Musculoskeletal:         General: Normal range of motion.      Cervical back: Neck supple.   Skin:     General: Skin is warm and dry.   Neurological:      Mental Status: He is alert and oriented to person, place, and time.   Psychiatric:         Behavior: Behavior normal.          /58   Pulse 73   Ht 177.8 cm (70\")   Wt 85.3 kg (188 lb)   BMI 26.98 kg/m²     Assessment/Plan   Diagnoses and all orders for this visit:    Benign essential hypertension    Chronic obstructive pulmonary disease, unspecified COPD type ([facility])    Hypothyroidism, unspecified type    Mixed hyperlipidemia    PAF (paroxysmal atrial fibrillation) ([facility])    Other orders  -     tamsulosin (FLOMAX) 0.4 MG capsule 24 hr capsule; Take 1 capsule by mouth Daily.  -     losartan (Cozaar) 25 MG tablet; Take 1 tablet by mouth Daily.    patient w/ hypertension. Started on flomax by urology. bp has decreased w/ this. Will reduce losartan to 25 mg daily. Will continue current synthroid and lipitor medication dosing. Continue current therapy for COPD and steady activity emphasized. He has what appears to be a back sprain. To take prn tylenol w/ topical medication. He will remain w/ rate control and anticoagulation. He will f/u w/ new provider in [place] or here routinely.              "

## 2021-11-19 NOTE — TELEPHONE ENCOUNTER
Caller: Cuauhtemoc Buck    Relationship: Self    Best call back number: [phone] (H)    Requested Prescriptions:   Requested Prescriptions     Pending Prescriptions Disp Refills   • tiotropium (Spiriva HandiHaler) 18 MCG per inhalation capsule 30 capsule 2        Pharmacy where request should be sent: [facility] - [address] [facility] [phone] [facility] [phone]      Additional details provided by patient: PATIENT STATES COMPLETELY OUT AND PHARMACY IS NOT OPEN ON THE WEEKENDS    Does the patient have less than a 3 day supply:  [x] Yes  [] No    Wallace Rivera Rep   11/19/21 13:51 EST

## 2021-12-03 ENCOUNTER — TELEPHONE (OUTPATIENT)
Dept: INTERNAL MEDICINE | Facility: CLINIC | Age: 86
End: 2021-12-03

## 2021-12-03 NOTE — TELEPHONE ENCOUNTER
Caller: Cuauhtemoc Buck    Relationship to patient: Self    Best call back number: [phone]    Patient is needing: PATIENT CALLED REQUESTING A CALL BACK FROM [facility]. PATIENT STATES THAT THIS IS IN REGARDS TO WHAT APPTS HE IS NEEDING AND ORDERS THAT HE HAS HAD IN THE PAST.    PLEASE CALL BACK AND ADVISE

## 2021-12-22 DIAGNOSIS — E03.8 SUBCLINICAL HYPOTHYROIDISM: ICD-10-CM

## 2021-12-22 DIAGNOSIS — Z12.5 SCREENING FOR PROSTATE CANCER: ICD-10-CM

## 2021-12-22 DIAGNOSIS — I10 BENIGN ESSENTIAL HYPERTENSION: Primary | ICD-10-CM

## 2021-12-22 DIAGNOSIS — E78.2 MIXED HYPERLIPIDEMIA: ICD-10-CM

## 2021-12-28 ENCOUNTER — OFFICE VISIT (OUTPATIENT)
Dept: INTERNAL MEDICINE | Facility: CLINIC | Age: 86
End: 2021-12-28

## 2021-12-28 ENCOUNTER — LAB (OUTPATIENT)
Dept: LAB | Facility: HOSPITAL | Age: 86
End: 2021-12-28

## 2021-12-28 VITALS
WEIGHT: 189 LBS | HEIGHT: 70 IN | HEART RATE: 61 BPM | DIASTOLIC BLOOD PRESSURE: 58 MMHG | BODY MASS INDEX: 27.06 KG/M2 | SYSTOLIC BLOOD PRESSURE: 110 MMHG

## 2021-12-28 DIAGNOSIS — R06.09 EXERTIONAL DYSPNEA: ICD-10-CM

## 2021-12-28 DIAGNOSIS — J44.9 CHRONIC OBSTRUCTIVE PULMONARY DISEASE, UNSPECIFIED COPD TYPE (HCC): ICD-10-CM

## 2021-12-28 DIAGNOSIS — I48.92 PAROXYSMAL ATRIAL FLUTTER (HCC): ICD-10-CM

## 2021-12-28 DIAGNOSIS — E78.2 MIXED HYPERLIPIDEMIA: ICD-10-CM

## 2021-12-28 DIAGNOSIS — Z00.00 HEALTHCARE MAINTENANCE: Primary | ICD-10-CM

## 2021-12-28 DIAGNOSIS — I10 BENIGN ESSENTIAL HYPERTENSION: ICD-10-CM

## 2021-12-28 LAB
ALBUMIN SERPL-MCNC: 4.1 G/DL (ref 3.5–5.2)
ALBUMIN/GLOB SERPL: 1.1 G/DL
ALP SERPL-CCNC: 74 U/L (ref 39–117)
ALT SERPL W P-5'-P-CCNC: 23 U/L (ref 1–41)
ANION GAP SERPL CALCULATED.3IONS-SCNC: 8.5 MMOL/L (ref 5–15)
AST SERPL-CCNC: 31 U/L (ref 1–40)
BACTERIA UR QL AUTO: NORMAL /HPF
BASOPHILS # BLD AUTO: 0.03 10*3/MM3 (ref 0–0.2)
BASOPHILS NFR BLD AUTO: 0.4 % (ref 0–1.5)
BILIRUB SERPL-MCNC: 0.8 MG/DL (ref 0–1.2)
BILIRUB UR QL STRIP: NEGATIVE
BUN SERPL-MCNC: 16 MG/DL (ref 8–23)
BUN/CREAT SERPL: 17.4 (ref 7–25)
CALCIUM SPEC-SCNC: 9.7 MG/DL (ref 8.6–10.5)
CHLORIDE SERPL-SCNC: 105 MMOL/L (ref 98–107)
CHOLEST SERPL-MCNC: 129 MG/DL (ref 0–200)
CLARITY UR: CLEAR
CO2 SERPL-SCNC: 29.5 MMOL/L (ref 22–29)
COLOR UR: YELLOW
CREAT SERPL-MCNC: 0.92 MG/DL (ref 0.76–1.27)
DEPRECATED RDW RBC AUTO: 45.4 FL (ref 37–54)
EOSINOPHIL # BLD AUTO: 0.37 10*3/MM3 (ref 0–0.4)
EOSINOPHIL NFR BLD AUTO: 5.4 % (ref 0.3–6.2)
ERYTHROCYTE [DISTWIDTH] IN BLOOD BY AUTOMATED COUNT: 12.4 % (ref 12.3–15.4)
GFR SERPL CREATININE-BSD FRML MDRD: 78 ML/MIN/1.73
GLOBULIN UR ELPH-MCNC: 3.7 GM/DL
GLUCOSE SERPL-MCNC: 107 MG/DL (ref 65–99)
GLUCOSE UR STRIP-MCNC: NEGATIVE MG/DL
HCT VFR BLD AUTO: 45.9 % (ref 37.5–51)
HDLC SERPL-MCNC: 59 MG/DL (ref 40–60)
HGB BLD-MCNC: 15.1 G/DL (ref 13–17.7)
HGB UR QL STRIP.AUTO: NEGATIVE
HYALINE CASTS UR QL AUTO: NORMAL /LPF
IMM GRANULOCYTES # BLD AUTO: 0.01 10*3/MM3 (ref 0–0.05)
IMM GRANULOCYTES NFR BLD AUTO: 0.1 % (ref 0–0.5)
KETONES UR QL STRIP: NEGATIVE
LDLC SERPL CALC-MCNC: 58 MG/DL (ref 0–100)
LDLC/HDLC SERPL: 1.01 {RATIO}
LEUKOCYTE ESTERASE UR QL STRIP.AUTO: NEGATIVE
LYMPHOCYTES # BLD AUTO: 2.31 10*3/MM3 (ref 0.7–3.1)
LYMPHOCYTES NFR BLD AUTO: 33.7 % (ref 19.6–45.3)
MCH RBC QN AUTO: 32.5 PG (ref 26.6–33)
MCHC RBC AUTO-ENTMCNC: 32.9 G/DL (ref 31.5–35.7)
MCV RBC AUTO: 98.9 FL (ref 79–97)
MONOCYTES # BLD AUTO: 1.16 10*3/MM3 (ref 0.1–0.9)
MONOCYTES NFR BLD AUTO: 16.9 % (ref 5–12)
NEUTROPHILS NFR BLD AUTO: 2.97 10*3/MM3 (ref 1.7–7)
NEUTROPHILS NFR BLD AUTO: 43.5 % (ref 42.7–76)
NITRITE UR QL STRIP: NEGATIVE
NRBC BLD AUTO-RTO: 0 /100 WBC (ref 0–0.2)
PH UR STRIP.AUTO: 7 [PH] (ref 5–8)
PLATELET # BLD AUTO: 272 10*3/MM3 (ref 140–450)
PMV BLD AUTO: 11.3 FL (ref 6–12)
POTASSIUM SERPL-SCNC: 3.7 MMOL/L (ref 3.5–5.2)
PROT SERPL-MCNC: 7.8 G/DL (ref 6–8.5)
PROT UR QL STRIP: NEGATIVE
PSA SERPL-MCNC: 0.71 NG/ML (ref 0–4)
RBC # BLD AUTO: 4.64 10*6/MM3 (ref 4.14–5.8)
RBC # UR STRIP: NORMAL /HPF
REF LAB TEST METHOD: NORMAL
SODIUM SERPL-SCNC: 143 MMOL/L (ref 136–145)
SP GR UR STRIP: 1.01 (ref 1–1.03)
SQUAMOUS #/AREA URNS HPF: NORMAL /HPF
TRIGL SERPL-MCNC: 53 MG/DL (ref 0–150)
TSH SERPL DL<=0.05 MIU/L-ACNC: 2.62 UIU/ML (ref 0.27–4.2)
UROBILINOGEN UR QL STRIP: NORMAL
VLDLC SERPL-MCNC: 12 MG/DL (ref 5–40)
WBC # UR STRIP: NORMAL /HPF
WBC NRBC COR # BLD: 6.85 10*3/MM3 (ref 3.4–10.8)

## 2021-12-28 PROCEDURE — 1170F FXNL STATUS ASSESSED: CPT | Performed by: INTERNAL MEDICINE

## 2021-12-28 PROCEDURE — 36415 COLL VENOUS BLD VENIPUNCTURE: CPT

## 2021-12-28 PROCEDURE — 96160 PT-FOCUSED HLTH RISK ASSMT: CPT | Performed by: INTERNAL MEDICINE

## 2021-12-28 PROCEDURE — G0103 PSA SCREENING: HCPCS | Performed by: INTERNAL MEDICINE

## 2021-12-28 PROCEDURE — 99214 OFFICE O/P EST MOD 30 MIN: CPT | Performed by: INTERNAL MEDICINE

## 2021-12-28 PROCEDURE — 81001 URINALYSIS AUTO W/SCOPE: CPT | Performed by: INTERNAL MEDICINE

## 2021-12-28 PROCEDURE — 1159F MED LIST DOCD IN RCRD: CPT | Performed by: INTERNAL MEDICINE

## 2021-12-28 PROCEDURE — G0439 PPPS, SUBSEQ VISIT: HCPCS | Performed by: INTERNAL MEDICINE

## 2021-12-28 PROCEDURE — 85025 COMPLETE CBC W/AUTO DIFF WBC: CPT | Performed by: INTERNAL MEDICINE

## 2021-12-28 PROCEDURE — 84443 ASSAY THYROID STIM HORMONE: CPT | Performed by: INTERNAL MEDICINE

## 2021-12-28 PROCEDURE — 80061 LIPID PANEL: CPT | Performed by: INTERNAL MEDICINE

## 2021-12-28 PROCEDURE — 80053 COMPREHEN METABOLIC PANEL: CPT | Performed by: INTERNAL MEDICINE

## 2021-12-28 NOTE — PROGRESS NOTES
The ABCs of the Annual Wellness Visit  Subsequent Medicare Wellness Visit    Chief Complaint   Patient presents with   • Annual Exam   • Hypertension   • COPD   • Atrial Fibrillation   • Hyperlipidemia      Subjective    History of Present Illness:  Cuauhtemoc Buck is a 86 y.o. male who presents for a Subsequent Medicare Wellness Visit, to review chronic issues, and to discuss any acute needs. He is doing well today. pland to move to [place] this week. Has family for support there.   He has hypertension. Recently noted to have pm hypotension. Taking furosemide 2 am and one hs and has been euvolemic w/ this. On diltiazem 30 tid for afib and is anticoagulated. Has advanced COPD. Oxygen continuous at 2-2.5 liters. This is stable. He is on advair and spiriva. Albuterol usage on average 4 times a week. Occasionally once a day. He does get routine physical activity and did complete pulmonary rehab. CBC, cmp, lipid, tsh, u/a, psa all reviewed and at goal level.         The following portions of the patient's history were reviewed and   updated as appropriate: allergies, current medications, past family history, past medical history, past social history, past surgical history and problem list.    Compared to one year ago, the patient feels his physical   health is the same.    Compared to one year ago, the patient feels his mental   health is the same.    Recent Hospitalizations:  He was not admitted to the hospital during the last year.       Current Medical Providers:  Patient Care Team:  Padmini Jha MD as PCP - General (Internal Medicine)  Peggy Trejo MD as Consulting Physician (Cardiology)    Outpatient Medications Prior to Visit   Medication Sig Dispense Refill   • albuterol sulfate  (90 Base) MCG/ACT inhaler INHALE TWO PUFFS BY MOUTH EVERY 6 HOURS AS NEEDED FOR WHEEZING 18 g 4   • apixaban (Eliquis) 5 MG tablet tablet Take 1 tablet by mouth Every 12 (Twelve) Hours. 28 tablet 0   • atorvastatin  (LIPITOR) 20 MG tablet TAKE 1 TABLET EVERY DAY 90 tablet 3   • dilTIAZem (CARDIZEM) 30 MG tablet TAKE ONE TABLET BY MOUTH THREE TIMES A  tablet 3   • fluticasone (FLONASE) 50 MCG/ACT nasal spray 2 sprays into the nostril(s) as directed by provider Daily.     • fluticasone-salmeterol (ADVAIR) 250-50 MCG/DOSE DISKUS Inhale 1 puff 2 (Two) Times a Day. 60 each 5   • furosemide (LASIX) 20 MG tablet Take 2 tablets by mouth 2 (Two) Times a Day. (Patient taking differently: Take two tablets every morning and one tablet every evening) 360 tablet 3   • ipratropium-albuterol (DUO-NEB) 0.5-2.5 mg/3 ml nebulizer INHALE THE CONTENT OF ONE VIAL VIA NEBULIZER EVERY 4 HOURS AS NEEDED FOR WHEEZING 90 mL 3   • levothyroxine (SYNTHROID, LEVOTHROID) 50 MCG tablet TAKE ONE TABLET BY MOUTH DAILY 90 tablet 3   • losartan (Cozaar) 25 MG tablet Take 1 tablet by mouth Daily. 90 tablet 1   • O2 (OXYGEN) Inhale 1 (One) Time. 2.5-3 liters     • tiotropium (Spiriva HandiHaler) 18 MCG per inhalation capsule Place 1 capsule into inhaler and inhale Daily. 30 capsule 2   • vitamin B-12 (CYANOCOBALAMIN) 100 MCG tablet Take  by mouth Daily.     • tamsulosin (FLOMAX) 0.4 MG capsule 24 hr capsule Take 1 capsule by mouth Daily.       Facility-Administered Medications Prior to Visit   Medication Dose Route Frequency Provider Last Rate Last Admin   • levalbuterol (XOPENEX) nebulizer solution 0.63 mg  0.63 mg Nebulization Q6H PRN Padmini Jha MD   0.63 mg at 11/01/19 1503       No opioid medication identified on active medication list. I have reviewed chart for other potential  high risk medication/s and harmful drug interactions in the elderly.          Aspirin is not on active medication list.  Aspirin use is not indicated based on review of current medical condition/s. Risk of harm outweighs potential benefits.  .    Patient Active Problem List   Diagnosis   • Benign essential hypertension   • Chronic obstructive pulmonary disease (HCC)   •  "Mixed hyperlipidemia   • Shoulder joint pain   • Subclinical hypothyroidism   • Exertional dyspnea   • Paroxysmal atrial flutter (HCC)   • PAF (paroxysmal atrial fibrillation) (HCC)   • PVC (premature ventricular contraction)     Advance Care Planning  Advance Directive is not on file.  ACP discussion was held with the patient during this visit. Patient has an advance directive (not in EMR), copy requested.          Objective    Vitals:    12/28/21 1515   BP: 110/58   Pulse: 61   Weight: 85.7 kg (189 lb)   Height: 177.8 cm (70\")     BMI Readings from Last 1 Encounters:   12/28/21 27.12 kg/m²   BMI is above normal parameters. Recommendations include: exercise counseling and nutrition counseling    Does the patient have evidence of cognitive impairment? No    Physical Exam  Vitals and nursing note reviewed.   Constitutional:       Appearance: He is well-developed.   HENT:      Head: Normocephalic and atraumatic.      Right Ear: External ear normal.      Left Ear: External ear normal.      Nose: Nose normal.   Eyes:      Pupils: Pupils are equal, round, and reactive to light.   Cardiovascular:      Rate and Rhythm: Normal rate and regular rhythm.      Heart sounds: Normal heart sounds.   Pulmonary:      Effort: Pulmonary effort is normal. No respiratory distress.      Breath sounds: Normal breath sounds.   Abdominal:      Palpations: Abdomen is soft.   Musculoskeletal:         General: Normal range of motion.      Cervical back: Neck supple.   Skin:     General: Skin is warm and dry.   Neurological:      Mental Status: He is alert and oriented to person, place, and time.   Psychiatric:         Behavior: Behavior normal.       Lab Results   Component Value Date    TRIG 53 12/28/2021    HDL 59 12/28/2021    LDL 58 12/28/2021    VLDL 12 12/28/2021            HEALTH RISK ASSESSMENT    Smoking Status:  Social History     Tobacco Use   Smoking Status Former Smoker   • Packs/day: 1.50   • Years: 35.00   • Pack years: 52.50 "   • Types: Cigarettes   • Quit date:    • Years since quittin.0   Smokeless Tobacco Never Used   Tobacco Comment    caffeine use     Alcohol Consumption:  Social History     Substance and Sexual Activity   Alcohol Use Not Currently    Comment: 5-7 beer/liquor drinks a week     Fall Risk Screen:    LYNN Fall Risk Assessment was completed, and patient is at LOW risk for falls.Assessment completed on:2021    Depression Screening:  PHQ-2/PHQ-9 Depression Screening 2021   Little interest or pleasure in doing things 0   Feeling down, depressed, or hopeless 0   Total Score 0       Health Habits and Functional and Cognitive Screening:  Functional & Cognitive Status 2021   Do you have difficulty preparing food and eating? No   Do you have difficulty bathing yourself, getting dressed or grooming yourself? No   Do you have difficulty using the toilet? No   Do you have difficulty moving around from place to place? No   Do you have trouble with steps or getting out of a bed or a chair? No   Current Diet -   Dental Exam -   Eye Exam -   Exercise (times per week) -   Current Exercise Activities Include -   Do you need help using the phone?  No   Are you deaf or do you have serious difficulty hearing?  Yes   Do you need help with transportation? Yes   Do you need help shopping? No   Do you need help preparing meals?  No   Do you need help with housework?  No   Do you need help with laundry? No   Do you need help taking your medications? No   Do you need help managing money? No   Do you ever drive or ride in a car without wearing a seat belt? No   Have you felt unusual stress, anger or loneliness in the last month? No   Who do you live with? Alone   If you need help, do you have trouble finding someone available to you? No   Have you been bothered in the last four weeks by sexual problems? No   Do you have difficulty concentrating, remembering or making decisions? No       Age-appropriate Screening  Schedule:  Refer to the list below for future screening recommendations based on patient's age, sex and/or medical conditions. Orders for these recommended tests are listed in the plan section. The patient has been provided with a written plan.    Health Maintenance   Topic Date Due   • ZOSTER VACCINE (3 of 3) 06/25/2019   • LIPID PANEL  12/28/2022   • TDAP/TD VACCINES (4 - Td or Tdap) 12/13/2029   • INFLUENZA VACCINE  Completed              Assessment/Plan   CMS Preventative Services Quick Reference  Risk Factors Identified During Encounter  Cardiovascular Disease  Fall Risk-High or Moderate  Hearing Problem  The above risks/problems have been discussed with the patient.  Follow up actions/plans if indicated are seen below in the Assessment/Plan Section.  Pertinent information has been shared with the patient in the After Visit Summary.    Diagnoses and all orders for this visit:    1. Healthcare maintenance (Primary)    2. Benign essential hypertension    3. Chronic obstructive pulmonary disease, unspecified COPD type (HCC)    4. Mixed hyperlipidemia    5. Paroxysmal atrial flutter (HCC)    6. Exertional dyspnea        Follow Up:   Return if symptoms worsen or fail to improve.     An After Visit Summary and PPPS were made available to the patient.Patient w/ hypertension. bp low normal today. Will decrease losartan to 12.5 mg daily and monitor. He is to call w/ bp readings over next 2 weeks. Continue current meds for atrial fib, hyperlip, and copd. He is encouraged to continue activity as tolerated. Low salt diet. He will continue to utilize oxygen and may need to be evaluated if o2 needs increase. Will update vaccinations (patient reports current on covid and flu). He will be transferring care to MD in new site but may f/u here in a prn fashion until established there.          I spent 50 minutes caring for Cuauhtemoc on this date of service. This time includes time spent by me in the following activities:preparing for  the visit, reviewing tests, obtaining and/or reviewing a separately obtained history, performing a medically appropriate examination and/or evaluation , counseling and educating the patient/family/caregiver, ordering medications, tests, or procedures, documenting information in the medical record and independently interpreting results and communicating that information with the patient/family/caregiver

## 2022-01-18 DIAGNOSIS — I48.0 PAF (PAROXYSMAL ATRIAL FIBRILLATION): ICD-10-CM

## 2022-01-19 RX ORDER — APIXABAN 5 MG/1
TABLET, FILM COATED ORAL
Qty: 60 TABLET | Refills: 11 | Status: SHIPPED | OUTPATIENT
Start: 2022-01-19 | End: 2023-02-21

## 2023-02-16 DIAGNOSIS — I48.0 PAF (PAROXYSMAL ATRIAL FIBRILLATION): ICD-10-CM

## 2023-02-16 NOTE — TELEPHONE ENCOUNTER
I called pt and left a message to return my call regarding the Eliquis prescription.  He is due for a follow up appointment.  LOV was 9/2/2021. Did pt establish care with a new cardiologist in [place] or does he plan to continue care with us?/mario

## 2023-02-21 RX ORDER — APIXABAN 5 MG/1
TABLET, FILM COATED ORAL
Qty: 60 TABLET | Refills: 3 | Status: SHIPPED | OUTPATIENT
Start: 2023-02-21

## 2023-10-10 DIAGNOSIS — I48.0 PAF (PAROXYSMAL ATRIAL FIBRILLATION): ICD-10-CM

## 2023-10-10 RX ORDER — APIXABAN 5 MG/1
TABLET, FILM COATED ORAL
Qty: 60 TABLET | Refills: 0 | OUTPATIENT
Start: 2023-10-10

## 2023-10-16 DIAGNOSIS — I48.0 PAF (PAROXYSMAL ATRIAL FIBRILLATION): ICD-10-CM

## 2023-10-16 RX ORDER — APIXABAN 5 MG/1
TABLET, FILM COATED ORAL
Qty: 60 TABLET | Refills: 0 | OUTPATIENT
Start: 2023-10-16

## 2024-05-02 ENCOUNTER — APPOINTMENT (OUTPATIENT)
Dept: GENERAL RADIOLOGY | Facility: HOSPITAL | Age: 89
DRG: 369 | End: 2024-05-02
Payer: MEDICARE

## 2024-05-02 ENCOUNTER — APPOINTMENT (OUTPATIENT)
Dept: CT IMAGING | Facility: HOSPITAL | Age: 89
DRG: 369 | End: 2024-05-02
Payer: MEDICARE

## 2024-05-02 ENCOUNTER — HOSPITAL ENCOUNTER (INPATIENT)
Facility: HOSPITAL | Age: 89
LOS: 5 days | Discharge: HOME | DRG: 369 | End: 2024-05-07
Attending: EMERGENCY MEDICINE | Admitting: STUDENT IN AN ORGANIZED HEALTH CARE EDUCATION/TRAINING PROGRAM
Payer: MEDICARE

## 2024-05-02 DIAGNOSIS — J96.21 ACUTE ON CHRONIC RESPIRATORY FAILURE WITH HYPOXIA: Primary | ICD-10-CM

## 2024-05-02 DIAGNOSIS — I48.91 ATRIAL FIBRILLATION WITH RAPID VENTRICULAR RESPONSE: ICD-10-CM

## 2024-05-02 DIAGNOSIS — I48.0 PAF (PAROXYSMAL ATRIAL FIBRILLATION): ICD-10-CM

## 2024-05-02 DIAGNOSIS — Z79.01 CHRONIC ANTICOAGULATION: ICD-10-CM

## 2024-05-02 DIAGNOSIS — I50.9 ACUTE CONGESTIVE HEART FAILURE, UNSPECIFIED HEART FAILURE TYPE: ICD-10-CM

## 2024-05-02 LAB
ALBUMIN SERPL-MCNC: 3.1 G/DL (ref 3.5–5.2)
ALBUMIN/GLOB SERPL: 1.1 G/DL
ALP SERPL-CCNC: 70 U/L (ref 39–117)
ALT SERPL W P-5'-P-CCNC: 17 U/L (ref 1–41)
ANION GAP SERPL CALCULATED.3IONS-SCNC: 11.1 MMOL/L (ref 5–15)
APTT PPP: 36.8 SECONDS (ref 22.7–35.4)
AST SERPL-CCNC: 21 U/L (ref 1–40)
B PARAPERT DNA SPEC QL NAA+PROBE: NOT DETECTED
B PERT DNA SPEC QL NAA+PROBE: NOT DETECTED
BASOPHILS # BLD AUTO: 0.02 10*3/MM3 (ref 0–0.2)
BASOPHILS NFR BLD AUTO: 0.3 % (ref 0–1.5)
BILIRUB SERPL-MCNC: 0.8 MG/DL (ref 0–1.2)
BUN SERPL-MCNC: 30 MG/DL (ref 8–23)
BUN/CREAT SERPL: 38 (ref 7–25)
C PNEUM DNA NPH QL NAA+NON-PROBE: NOT DETECTED
CALCIUM SPEC-SCNC: 8.4 MG/DL (ref 8.6–10.5)
CHLORIDE SERPL-SCNC: 102 MMOL/L (ref 98–107)
CO2 SERPL-SCNC: 24.9 MMOL/L (ref 22–29)
CREAT SERPL-MCNC: 0.79 MG/DL (ref 0.76–1.27)
DEPRECATED RDW RBC AUTO: 44.4 FL (ref 37–54)
EGFRCR SERPLBLD CKD-EPI 2021: 86.5 ML/MIN/1.73
EOSINOPHIL # BLD AUTO: 0.02 10*3/MM3 (ref 0–0.4)
EOSINOPHIL NFR BLD AUTO: 0.3 % (ref 0.3–6.2)
ERYTHROCYTE [DISTWIDTH] IN BLOOD BY AUTOMATED COUNT: 12.5 % (ref 12.3–15.4)
FLUAV SUBTYP SPEC NAA+PROBE: NOT DETECTED
FLUBV RNA ISLT QL NAA+PROBE: NOT DETECTED
GLOBULIN UR ELPH-MCNC: 2.8 GM/DL
GLUCOSE SERPL-MCNC: 127 MG/DL (ref 65–99)
HADV DNA SPEC NAA+PROBE: NOT DETECTED
HCOV 229E RNA SPEC QL NAA+PROBE: NOT DETECTED
HCOV HKU1 RNA SPEC QL NAA+PROBE: NOT DETECTED
HCOV NL63 RNA SPEC QL NAA+PROBE: NOT DETECTED
HCOV OC43 RNA SPEC QL NAA+PROBE: NOT DETECTED
HCT VFR BLD AUTO: 35.2 % (ref 37.5–51)
HGB BLD-MCNC: 11.5 G/DL (ref 13–17.7)
HMPV RNA NPH QL NAA+NON-PROBE: NOT DETECTED
HOLD SPECIMEN: NORMAL
HOLD SPECIMEN: NORMAL
HPIV1 RNA ISLT QL NAA+PROBE: NOT DETECTED
HPIV2 RNA SPEC QL NAA+PROBE: NOT DETECTED
HPIV3 RNA NPH QL NAA+PROBE: NOT DETECTED
HPIV4 P GENE NPH QL NAA+PROBE: NOT DETECTED
IMM GRANULOCYTES # BLD AUTO: 0.05 10*3/MM3 (ref 0–0.05)
IMM GRANULOCYTES NFR BLD AUTO: 0.7 % (ref 0–0.5)
INR PPP: 1.45 (ref 0.9–1.1)
LYMPHOCYTES # BLD AUTO: 0.99 10*3/MM3 (ref 0.7–3.1)
LYMPHOCYTES NFR BLD AUTO: 13.2 % (ref 19.6–45.3)
M PNEUMO IGG SER IA-ACNC: NOT DETECTED
MCH RBC QN AUTO: 31.5 PG (ref 26.6–33)
MCHC RBC AUTO-ENTMCNC: 32.7 G/DL (ref 31.5–35.7)
MCV RBC AUTO: 96.4 FL (ref 79–97)
MONOCYTES # BLD AUTO: 0.86 10*3/MM3 (ref 0.1–0.9)
MONOCYTES NFR BLD AUTO: 11.5 % (ref 5–12)
NEUTROPHILS NFR BLD AUTO: 5.57 10*3/MM3 (ref 1.7–7)
NEUTROPHILS NFR BLD AUTO: 74 % (ref 42.7–76)
NRBC BLD AUTO-RTO: 0 /100 WBC (ref 0–0.2)
NT-PROBNP SERPL-MCNC: 738 PG/ML (ref 0–1800)
PLATELET # BLD AUTO: 227 10*3/MM3 (ref 140–450)
PMV BLD AUTO: 11.6 FL (ref 6–12)
POTASSIUM SERPL-SCNC: 4.3 MMOL/L (ref 3.5–5.2)
PROT SERPL-MCNC: 5.9 G/DL (ref 6–8.5)
PROTHROMBIN TIME: 17.9 SECONDS (ref 11.7–14.2)
RBC # BLD AUTO: 3.65 10*6/MM3 (ref 4.14–5.8)
RHINOVIRUS RNA SPEC NAA+PROBE: NOT DETECTED
RSV RNA NPH QL NAA+NON-PROBE: NOT DETECTED
SARS-COV-2 RNA NPH QL NAA+NON-PROBE: NOT DETECTED
SODIUM SERPL-SCNC: 138 MMOL/L (ref 136–145)
TROPONIN T SERPL HS-MCNC: 22 NG/L
WBC NRBC COR # BLD AUTO: 7.51 10*3/MM3 (ref 3.4–10.8)
WHOLE BLOOD HOLD COAG: NORMAL
WHOLE BLOOD HOLD SPECIMEN: NORMAL

## 2024-05-02 PROCEDURE — 25010000002 ONDANSETRON PER 1 MG: Performed by: EMERGENCY MEDICINE

## 2024-05-02 PROCEDURE — 93005 ELECTROCARDIOGRAM TRACING: CPT

## 2024-05-02 PROCEDURE — 83880 ASSAY OF NATRIURETIC PEPTIDE: CPT | Performed by: EMERGENCY MEDICINE

## 2024-05-02 PROCEDURE — 93010 ELECTROCARDIOGRAM REPORT: CPT | Performed by: INTERNAL MEDICINE

## 2024-05-02 PROCEDURE — 80053 COMPREHEN METABOLIC PANEL: CPT | Performed by: EMERGENCY MEDICINE

## 2024-05-02 PROCEDURE — 85025 COMPLETE CBC W/AUTO DIFF WBC: CPT | Performed by: EMERGENCY MEDICINE

## 2024-05-02 PROCEDURE — 71275 CT ANGIOGRAPHY CHEST: CPT

## 2024-05-02 PROCEDURE — 0202U NFCT DS 22 TRGT SARS-COV-2: CPT | Performed by: EMERGENCY MEDICINE

## 2024-05-02 PROCEDURE — 85610 PROTHROMBIN TIME: CPT | Performed by: EMERGENCY MEDICINE

## 2024-05-02 PROCEDURE — 93005 ELECTROCARDIOGRAM TRACING: CPT | Performed by: EMERGENCY MEDICINE

## 2024-05-02 PROCEDURE — 84484 ASSAY OF TROPONIN QUANT: CPT | Performed by: EMERGENCY MEDICINE

## 2024-05-02 PROCEDURE — 85730 THROMBOPLASTIN TIME PARTIAL: CPT | Performed by: EMERGENCY MEDICINE

## 2024-05-02 PROCEDURE — 99285 EMERGENCY DEPT VISIT HI MDM: CPT

## 2024-05-02 PROCEDURE — 71045 X-RAY EXAM CHEST 1 VIEW: CPT

## 2024-05-02 PROCEDURE — 25010000002 FUROSEMIDE PER 20 MG: Performed by: EMERGENCY MEDICINE

## 2024-05-02 RX ORDER — DILTIAZEM HYDROCHLORIDE 5 MG/ML
10 INJECTION INTRAVENOUS ONCE
Status: COMPLETED | OUTPATIENT
Start: 2024-05-02 | End: 2024-05-02

## 2024-05-02 RX ORDER — ONDANSETRON 2 MG/ML
4 INJECTION INTRAMUSCULAR; INTRAVENOUS ONCE
Status: COMPLETED | OUTPATIENT
Start: 2024-05-03 | End: 2024-05-02

## 2024-05-02 RX ORDER — FUROSEMIDE 10 MG/ML
80 INJECTION INTRAMUSCULAR; INTRAVENOUS ONCE
Status: COMPLETED | OUTPATIENT
Start: 2024-05-02 | End: 2024-05-02

## 2024-05-02 RX ORDER — SODIUM CHLORIDE 0.9 % (FLUSH) 0.9 %
10 SYRINGE (ML) INJECTION AS NEEDED
Status: DISCONTINUED | OUTPATIENT
Start: 2024-05-02 | End: 2024-05-07 | Stop reason: HOSPADM

## 2024-05-02 RX ORDER — POTASSIUM CHLORIDE 750 MG/1
10 TABLET, FILM COATED, EXTENDED RELEASE ORAL ONCE
COMMUNITY

## 2024-05-02 RX ADMIN — ONDANSETRON 4 MG: 2 INJECTION INTRAMUSCULAR; INTRAVENOUS at 23:56

## 2024-05-02 RX ADMIN — FUROSEMIDE 80 MG: 10 INJECTION, SOLUTION INTRAMUSCULAR; INTRAVENOUS at 22:10

## 2024-05-02 RX ADMIN — DILTIAZEM HYDROCHLORIDE 10 MG: 5 INJECTION INTRAVENOUS at 22:10

## 2024-05-03 ENCOUNTER — APPOINTMENT (OUTPATIENT)
Dept: CARDIOLOGY | Facility: HOSPITAL | Age: 89
DRG: 369 | End: 2024-05-03
Payer: MEDICARE

## 2024-05-03 ENCOUNTER — PREP FOR SURGERY (OUTPATIENT)
Dept: OTHER | Facility: HOSPITAL | Age: 89
End: 2024-05-03
Payer: MEDICARE

## 2024-05-03 DIAGNOSIS — K92.1 MELENA: Primary | ICD-10-CM

## 2024-05-03 PROBLEM — K92.0 HEMATEMESIS: Status: ACTIVE | Noted: 2024-05-03

## 2024-05-03 LAB
ABO GROUP BLD: NORMAL
ANION GAP SERPL CALCULATED.3IONS-SCNC: 8 MMOL/L (ref 5–15)
AORTIC DIMENSIONLESS INDEX: 0.9 (DI)
ASCENDING AORTA: 3.2 CM
BH CV ECHO MEAS - ACS: 2.1 CM
BH CV ECHO MEAS - AO MAX PG: 6.2 MMHG
BH CV ECHO MEAS - AO MEAN PG: 4 MMHG
BH CV ECHO MEAS - AO ROOT DIAM: 3 CM
BH CV ECHO MEAS - AO V2 MAX: 124 CM/SEC
BH CV ECHO MEAS - AO V2 VTI: 20.1 CM
BH CV ECHO MEAS - AVA(I,D): 2.23 CM2
BH CV ECHO MEAS - EDV(CUBED): 91.1 ML
BH CV ECHO MEAS - EDV(MOD-SP2): 72 ML
BH CV ECHO MEAS - EDV(MOD-SP4): 67 ML
BH CV ECHO MEAS - EF(MOD-BP): 67.4 %
BH CV ECHO MEAS - EF(MOD-SP2): 63.9 %
BH CV ECHO MEAS - EF(MOD-SP4): 68.7 %
BH CV ECHO MEAS - ESV(CUBED): 24.4 ML
BH CV ECHO MEAS - ESV(MOD-SP2): 26 ML
BH CV ECHO MEAS - ESV(MOD-SP4): 21 ML
BH CV ECHO MEAS - FS: 35.6 %
BH CV ECHO MEAS - IVS/LVPW: 1.2 CM
BH CV ECHO MEAS - IVSD: 1.5 CM
BH CV ECHO MEAS - LAT PEAK E' VEL: 9.8 CM/SEC
BH CV ECHO MEAS - LV MASS(C)D: 241.8 GRAMS
BH CV ECHO MEAS - LV MAX PG: 4.4 MMHG
BH CV ECHO MEAS - LV MEAN PG: 2 MMHG
BH CV ECHO MEAS - LV V1 MAX: 105 CM/SEC
BH CV ECHO MEAS - LV V1 VTI: 15.8 CM
BH CV ECHO MEAS - LVIDD: 4.5 CM
BH CV ECHO MEAS - LVIDS: 2.9 CM
BH CV ECHO MEAS - LVOT AREA: 2.8 CM2
BH CV ECHO MEAS - LVOT DIAM: 1.9 CM
BH CV ECHO MEAS - LVPWD: 1.25 CM
BH CV ECHO MEAS - MED PEAK E' VEL: 8.5 CM/SEC
BH CV ECHO MEAS - MV DEC SLOPE: 1176 CM/SEC2
BH CV ECHO MEAS - MV DEC TIME: 0.09 SEC
BH CV ECHO MEAS - MV E MAX VEL: 106.7 CM/SEC
BH CV ECHO MEAS - MV MAX PG: 7 MMHG
BH CV ECHO MEAS - MV MEAN PG: 4 MMHG
BH CV ECHO MEAS - MV P1/2T: 30.9 MSEC
BH CV ECHO MEAS - MV V2 VTI: 20.1 CM
BH CV ECHO MEAS - MVA(P1/2T): 7.1 CM2
BH CV ECHO MEAS - MVA(VTI): 2.23 CM2
BH CV ECHO MEAS - PA ACC TIME: 0.1 SEC
BH CV ECHO MEAS - PA V2 MAX: 108 CM/SEC
BH CV ECHO MEAS - RAP SYSTOLE: 3 MMHG
BH CV ECHO MEAS - RV MAX PG: 2.9 MMHG
BH CV ECHO MEAS - RV V1 MAX: 85.5 CM/SEC
BH CV ECHO MEAS - RV V1 VTI: 12.8 CM
BH CV ECHO MEAS - RVSP: 17 MMHG
BH CV ECHO MEAS - SUP REN AO DIAM: 2.1 CM
BH CV ECHO MEAS - SV(LVOT): 44.8 ML
BH CV ECHO MEAS - SV(MOD-SP2): 46 ML
BH CV ECHO MEAS - SV(MOD-SP4): 46 ML
BH CV ECHO MEAS - TAPSE (>1.6): 1.73 CM
BH CV ECHO MEAS - TR MAX PG: 14 MMHG
BH CV ECHO MEAS - TR MAX VEL: 187 CM/SEC
BH CV ECHO MEASUREMENTS AVERAGE E/E' RATIO: 11.66
BH CV XLRA - RV BASE: 3.6 CM
BH CV XLRA - RV LENGTH: 6.8 CM
BH CV XLRA - RV MID: 1.8 CM
BH CV XLRA - TDI S': 13.7 CM/SEC
BLD GP AB SCN SERPL QL: NEGATIVE
BUN SERPL-MCNC: 35 MG/DL (ref 8–23)
BUN/CREAT SERPL: 50 (ref 7–25)
CALCIUM SPEC-SCNC: 8.1 MG/DL (ref 8.6–10.5)
CHLORIDE SERPL-SCNC: 106 MMOL/L (ref 98–107)
CO2 SERPL-SCNC: 26 MMOL/L (ref 22–29)
CREAT SERPL-MCNC: 0.7 MG/DL (ref 0.76–1.27)
DEPRECATED RDW RBC AUTO: 42.3 FL (ref 37–54)
EGFRCR SERPLBLD CKD-EPI 2021: 89.7 ML/MIN/1.73
ERYTHROCYTE [DISTWIDTH] IN BLOOD BY AUTOMATED COUNT: 12.3 % (ref 12.3–15.4)
FERRITIN SERPL-MCNC: 135 NG/ML (ref 30–400)
GLUCOSE SERPL-MCNC: 143 MG/DL (ref 65–99)
HCT VFR BLD AUTO: 27.9 % (ref 37.5–51)
HCT VFR BLD AUTO: 28.5 % (ref 37.5–51)
HCT VFR BLD AUTO: 28.8 % (ref 37.5–51)
HGB BLD-MCNC: 9.2 G/DL (ref 13–17.7)
HGB BLD-MCNC: 9.4 G/DL (ref 13–17.7)
HGB BLD-MCNC: 9.5 G/DL (ref 13–17.7)
IRON 24H UR-MRATE: 116 MCG/DL (ref 59–158)
IRON SATN MFR SERPL: 48 % (ref 20–50)
LEFT ATRIUM VOLUME INDEX: 21.9 ML/M2
MAGNESIUM SERPL-MCNC: 2.1 MG/DL (ref 1.6–2.4)
MCH RBC QN AUTO: 32.1 PG (ref 26.6–33)
MCHC RBC AUTO-ENTMCNC: 34.1 G/DL (ref 31.5–35.7)
MCV RBC AUTO: 94.3 FL (ref 79–97)
PHOSPHATE SERPL-MCNC: 3.4 MG/DL (ref 2.5–4.5)
PLATELET # BLD AUTO: 201 10*3/MM3 (ref 140–450)
PMV BLD AUTO: 11.9 FL (ref 6–12)
POTASSIUM SERPL-SCNC: 4 MMOL/L (ref 3.5–5.2)
PROCALCITONIN SERPL-MCNC: <0.02 NG/ML (ref 0–0.25)
QT INTERVAL: 324 MS
QTC INTERVAL: 475 MS
RBC # BLD AUTO: 2.96 10*6/MM3 (ref 4.14–5.8)
RH BLD: POSITIVE
SINUS: 2.6 CM
SODIUM SERPL-SCNC: 140 MMOL/L (ref 136–145)
STJ: 2.3 CM
T&S EXPIRATION DATE: NORMAL
TIBC SERPL-MCNC: 240 MCG/DL (ref 298–536)
TRANSFERRIN SERPL-MCNC: 161 MG/DL (ref 200–360)
WBC NRBC COR # BLD AUTO: 14.41 10*3/MM3 (ref 3.4–10.8)

## 2024-05-03 PROCEDURE — 85014 HEMATOCRIT: CPT | Performed by: STUDENT IN AN ORGANIZED HEALTH CARE EDUCATION/TRAINING PROGRAM

## 2024-05-03 PROCEDURE — 86900 BLOOD TYPING SEROLOGIC ABO: CPT | Performed by: STUDENT IN AN ORGANIZED HEALTH CARE EDUCATION/TRAINING PROGRAM

## 2024-05-03 PROCEDURE — 25010000002 ONDANSETRON PER 1 MG: Performed by: EMERGENCY MEDICINE

## 2024-05-03 PROCEDURE — 93306 TTE W/DOPPLER COMPLETE: CPT

## 2024-05-03 PROCEDURE — 99222 1ST HOSP IP/OBS MODERATE 55: CPT | Performed by: PSYCHIATRY & NEUROLOGY

## 2024-05-03 PROCEDURE — 25510000001 PERFLUTREN (DEFINITY) 8.476 MG IN SODIUM CHLORIDE (PF) 0.9 % 10 ML INJECTION: Performed by: NURSE PRACTITIONER

## 2024-05-03 PROCEDURE — 84145 PROCALCITONIN (PCT): CPT | Performed by: STUDENT IN AN ORGANIZED HEALTH CARE EDUCATION/TRAINING PROGRAM

## 2024-05-03 PROCEDURE — 83540 ASSAY OF IRON: CPT | Performed by: STUDENT IN AN ORGANIZED HEALTH CARE EDUCATION/TRAINING PROGRAM

## 2024-05-03 PROCEDURE — 25510000001 IOPAMIDOL PER 1 ML: Performed by: STUDENT IN AN ORGANIZED HEALTH CARE EDUCATION/TRAINING PROGRAM

## 2024-05-03 PROCEDURE — 86850 RBC ANTIBODY SCREEN: CPT | Performed by: STUDENT IN AN ORGANIZED HEALTH CARE EDUCATION/TRAINING PROGRAM

## 2024-05-03 PROCEDURE — 25010000002 DIGOXIN PER 500 MCG: Performed by: NURSE PRACTITIONER

## 2024-05-03 PROCEDURE — 99222 1ST HOSP IP/OBS MODERATE 55: CPT | Performed by: INTERNAL MEDICINE

## 2024-05-03 PROCEDURE — 84466 ASSAY OF TRANSFERRIN: CPT | Performed by: STUDENT IN AN ORGANIZED HEALTH CARE EDUCATION/TRAINING PROGRAM

## 2024-05-03 PROCEDURE — 86900 BLOOD TYPING SEROLOGIC ABO: CPT

## 2024-05-03 PROCEDURE — 83735 ASSAY OF MAGNESIUM: CPT | Performed by: STUDENT IN AN ORGANIZED HEALTH CARE EDUCATION/TRAINING PROGRAM

## 2024-05-03 PROCEDURE — 82728 ASSAY OF FERRITIN: CPT | Performed by: STUDENT IN AN ORGANIZED HEALTH CARE EDUCATION/TRAINING PROGRAM

## 2024-05-03 PROCEDURE — 25010000002 FUROSEMIDE PER 20 MG: Performed by: NURSE PRACTITIONER

## 2024-05-03 PROCEDURE — 86901 BLOOD TYPING SEROLOGIC RH(D): CPT

## 2024-05-03 PROCEDURE — 86923 COMPATIBILITY TEST ELECTRIC: CPT

## 2024-05-03 PROCEDURE — P9016 RBC LEUKOCYTES REDUCED: HCPCS

## 2024-05-03 PROCEDURE — 94799 UNLISTED PULMONARY SVC/PX: CPT

## 2024-05-03 PROCEDURE — 25010000002 CEFTRIAXONE PER 250 MG: Performed by: STUDENT IN AN ORGANIZED HEALTH CARE EDUCATION/TRAINING PROGRAM

## 2024-05-03 PROCEDURE — 36415 COLL VENOUS BLD VENIPUNCTURE: CPT | Performed by: NURSE PRACTITIONER

## 2024-05-03 PROCEDURE — 80048 BASIC METABOLIC PNL TOTAL CA: CPT | Performed by: NURSE PRACTITIONER

## 2024-05-03 PROCEDURE — 85027 COMPLETE CBC AUTOMATED: CPT | Performed by: NURSE PRACTITIONER

## 2024-05-03 PROCEDURE — 99222 1ST HOSP IP/OBS MODERATE 55: CPT | Performed by: NURSE PRACTITIONER

## 2024-05-03 PROCEDURE — 36430 TRANSFUSION BLD/BLD COMPNT: CPT

## 2024-05-03 PROCEDURE — 86901 BLOOD TYPING SEROLOGIC RH(D): CPT | Performed by: STUDENT IN AN ORGANIZED HEALTH CARE EDUCATION/TRAINING PROGRAM

## 2024-05-03 PROCEDURE — 85018 HEMOGLOBIN: CPT | Performed by: STUDENT IN AN ORGANIZED HEALTH CARE EDUCATION/TRAINING PROGRAM

## 2024-05-03 PROCEDURE — 94640 AIRWAY INHALATION TREATMENT: CPT

## 2024-05-03 PROCEDURE — 93306 TTE W/DOPPLER COMPLETE: CPT | Performed by: INTERNAL MEDICINE

## 2024-05-03 PROCEDURE — 84100 ASSAY OF PHOSPHORUS: CPT | Performed by: STUDENT IN AN ORGANIZED HEALTH CARE EDUCATION/TRAINING PROGRAM

## 2024-05-03 RX ORDER — SODIUM CHLORIDE 0.9 % (FLUSH) 0.9 %
10 SYRINGE (ML) INJECTION EVERY 12 HOURS SCHEDULED
Status: DISCONTINUED | OUTPATIENT
Start: 2024-05-03 | End: 2024-05-07 | Stop reason: HOSPADM

## 2024-05-03 RX ORDER — ACETAMINOPHEN 160 MG/5ML
650 SOLUTION ORAL EVERY 4 HOURS PRN
Status: DISCONTINUED | OUTPATIENT
Start: 2024-05-03 | End: 2024-05-07 | Stop reason: HOSPADM

## 2024-05-03 RX ORDER — SODIUM CHLORIDE 0.9 % (FLUSH) 0.9 %
10 SYRINGE (ML) INJECTION AS NEEDED
Status: DISCONTINUED | OUTPATIENT
Start: 2024-05-03 | End: 2024-05-07 | Stop reason: HOSPADM

## 2024-05-03 RX ORDER — BISACODYL 5 MG/1
5 TABLET, DELAYED RELEASE ORAL DAILY PRN
Status: DISCONTINUED | OUTPATIENT
Start: 2024-05-03 | End: 2024-05-07 | Stop reason: HOSPADM

## 2024-05-03 RX ORDER — ONDANSETRON 2 MG/ML
4 INJECTION INTRAMUSCULAR; INTRAVENOUS ONCE
Status: COMPLETED | OUTPATIENT
Start: 2024-05-03 | End: 2024-05-03

## 2024-05-03 RX ORDER — FUROSEMIDE 10 MG/ML
40 INJECTION INTRAMUSCULAR; INTRAVENOUS
Status: DISCONTINUED | OUTPATIENT
Start: 2024-05-03 | End: 2024-05-03

## 2024-05-03 RX ORDER — PANTOPRAZOLE SODIUM 40 MG/10ML
80 INJECTION, POWDER, LYOPHILIZED, FOR SOLUTION INTRAVENOUS ONCE
Status: DISCONTINUED | OUTPATIENT
Start: 2024-05-03 | End: 2024-05-05

## 2024-05-03 RX ORDER — NITROGLYCERIN 0.4 MG/1
0.4 TABLET SUBLINGUAL
Status: DISCONTINUED | OUTPATIENT
Start: 2024-05-03 | End: 2024-05-07 | Stop reason: HOSPADM

## 2024-05-03 RX ORDER — UBIDECARENONE 75 MG
100 CAPSULE ORAL DAILY
Status: DISCONTINUED | OUTPATIENT
Start: 2024-05-03 | End: 2024-05-07 | Stop reason: HOSPADM

## 2024-05-03 RX ORDER — ATORVASTATIN CALCIUM 20 MG/1
20 TABLET, FILM COATED ORAL DAILY
Status: DISCONTINUED | OUTPATIENT
Start: 2024-05-03 | End: 2024-05-07 | Stop reason: HOSPADM

## 2024-05-03 RX ORDER — FUROSEMIDE 10 MG/ML
40 INJECTION INTRAMUSCULAR; INTRAVENOUS
Status: COMPLETED | OUTPATIENT
Start: 2024-05-03 | End: 2024-05-04

## 2024-05-03 RX ORDER — DIGOXIN 0.25 MG/ML
500 INJECTION INTRAMUSCULAR; INTRAVENOUS ONCE
Status: COMPLETED | OUTPATIENT
Start: 2024-05-03 | End: 2024-05-03

## 2024-05-03 RX ORDER — POLYETHYLENE GLYCOL 3350 17 G/17G
17 POWDER, FOR SOLUTION ORAL DAILY PRN
Status: DISCONTINUED | OUTPATIENT
Start: 2024-05-03 | End: 2024-05-07 | Stop reason: HOSPADM

## 2024-05-03 RX ORDER — ACETAMINOPHEN 325 MG/1
650 TABLET ORAL EVERY 4 HOURS PRN
Status: DISCONTINUED | OUTPATIENT
Start: 2024-05-03 | End: 2024-05-07 | Stop reason: HOSPADM

## 2024-05-03 RX ORDER — BISACODYL 10 MG
10 SUPPOSITORY, RECTAL RECTAL DAILY PRN
Status: DISCONTINUED | OUTPATIENT
Start: 2024-05-03 | End: 2024-05-07 | Stop reason: HOSPADM

## 2024-05-03 RX ORDER — LEVOTHYROXINE SODIUM 0.05 MG/1
50 TABLET ORAL DAILY
Status: DISCONTINUED | OUTPATIENT
Start: 2024-05-03 | End: 2024-05-07 | Stop reason: HOSPADM

## 2024-05-03 RX ORDER — AMOXICILLIN 250 MG
2 CAPSULE ORAL 2 TIMES DAILY PRN
Status: DISCONTINUED | OUTPATIENT
Start: 2024-05-03 | End: 2024-05-07 | Stop reason: HOSPADM

## 2024-05-03 RX ORDER — SODIUM CHLORIDE 9 MG/ML
40 INJECTION, SOLUTION INTRAVENOUS AS NEEDED
Status: DISCONTINUED | OUTPATIENT
Start: 2024-05-03 | End: 2024-05-07 | Stop reason: HOSPADM

## 2024-05-03 RX ORDER — PANTOPRAZOLE SODIUM 40 MG/1
80 TABLET, DELAYED RELEASE ORAL ONCE
Status: DISCONTINUED | OUTPATIENT
Start: 2024-05-03 | End: 2024-05-03

## 2024-05-03 RX ORDER — IPRATROPIUM BROMIDE AND ALBUTEROL SULFATE 2.5; .5 MG/3ML; MG/3ML
3 SOLUTION RESPIRATORY (INHALATION) 4 TIMES DAILY PRN
Status: DISCONTINUED | OUTPATIENT
Start: 2024-05-03 | End: 2024-05-07 | Stop reason: HOSPADM

## 2024-05-03 RX ORDER — TAMSULOSIN HYDROCHLORIDE 0.4 MG/1
0.4 CAPSULE ORAL DAILY
Status: DISCONTINUED | OUTPATIENT
Start: 2024-05-03 | End: 2024-05-07 | Stop reason: HOSPADM

## 2024-05-03 RX ORDER — BUDESONIDE AND FORMOTEROL FUMARATE DIHYDRATE 160; 4.5 UG/1; UG/1
2 AEROSOL RESPIRATORY (INHALATION)
Status: DISCONTINUED | OUTPATIENT
Start: 2024-05-03 | End: 2024-05-07 | Stop reason: HOSPADM

## 2024-05-03 RX ORDER — FUROSEMIDE 10 MG/ML
80 INJECTION INTRAMUSCULAR; INTRAVENOUS
Status: DISCONTINUED | OUTPATIENT
Start: 2024-05-03 | End: 2024-05-03

## 2024-05-03 RX ORDER — ACETAMINOPHEN 650 MG/1
650 SUPPOSITORY RECTAL EVERY 4 HOURS PRN
Status: DISCONTINUED | OUTPATIENT
Start: 2024-05-03 | End: 2024-05-07 | Stop reason: HOSPADM

## 2024-05-03 RX ADMIN — PERFLUTREN 2 ML: 6.52 INJECTION, SUSPENSION INTRAVENOUS at 14:32

## 2024-05-03 RX ADMIN — FUROSEMIDE 40 MG: 10 INJECTION, SOLUTION INTRAMUSCULAR; INTRAVENOUS at 21:04

## 2024-05-03 RX ADMIN — Medication 10 ML: at 12:07

## 2024-05-03 RX ADMIN — Medication 10 ML: at 21:23

## 2024-05-03 RX ADMIN — IOPAMIDOL 100 ML: 755 INJECTION, SOLUTION INTRAVENOUS at 00:02

## 2024-05-03 RX ADMIN — DILTIAZEM HYDROCHLORIDE 30 MG: 30 TABLET, FILM COATED ORAL at 12:07

## 2024-05-03 RX ADMIN — Medication 100 MCG: at 12:07

## 2024-05-03 RX ADMIN — PANTOPRAZOLE SODIUM 40 MG: 40 INJECTION, POWDER, FOR SOLUTION INTRAVENOUS at 08:02

## 2024-05-03 RX ADMIN — Medication 10 ML: at 02:33

## 2024-05-03 RX ADMIN — TAMSULOSIN HYDROCHLORIDE 0.4 MG: 0.4 CAPSULE ORAL at 12:07

## 2024-05-03 RX ADMIN — DILTIAZEM HYDROCHLORIDE 30 MG: 30 TABLET, FILM COATED ORAL at 22:29

## 2024-05-03 RX ADMIN — CEFTRIAXONE 2000 MG: 2 INJECTION, POWDER, FOR SOLUTION INTRAMUSCULAR; INTRAVENOUS at 12:06

## 2024-05-03 RX ADMIN — PANTOPRAZOLE SODIUM 40 MG: 40 INJECTION, POWDER, FOR SOLUTION INTRAVENOUS at 21:00

## 2024-05-03 RX ADMIN — LEVOTHYROXINE SODIUM 50 MCG: 50 TABLET ORAL at 12:07

## 2024-05-03 RX ADMIN — BUDESONIDE AND FORMOTEROL FUMARATE DIHYDRATE 2 PUFF: 160; 4.5 AEROSOL RESPIRATORY (INHALATION) at 20:16

## 2024-05-03 RX ADMIN — ATORVASTATIN CALCIUM 20 MG: 20 TABLET, FILM COATED ORAL at 12:08

## 2024-05-03 RX ADMIN — PANTOPRAZOLE SODIUM 40 MG: 40 INJECTION, POWDER, FOR SOLUTION INTRAVENOUS at 08:05

## 2024-05-03 RX ADMIN — ONDANSETRON 4 MG: 2 INJECTION INTRAMUSCULAR; INTRAVENOUS at 00:20

## 2024-05-03 RX ADMIN — DIGOXIN 500 MCG: 0.25 INJECTION INTRAMUSCULAR; INTRAVENOUS at 12:07

## 2024-05-03 NOTE — ED PROVIDER NOTES
EMERGENCY DEPARTMENT ENCOUNTER  Room Number:  10/10  PCP: System, Provider Not In  Independent Historians: Patient and son      HPI:  Chief Complaint: had concerns including Shortness of Breath.     A complete HPI/ROS/PMH/PSH/SH/FH are unobtainable due to: None    Chronic or social conditions impacting patient care (Social Determinants of Health): None      Context: Cuauhtemoc Buck is a 88 y.o. male with a medical history of hypertension, hyperlipidemia, paroxysmal atrial flutter/fib, COPD and emphysema who presents to the ED c/o acute dyspnea that began this morning and worsened throughout the day.  Significant worsening with any exertion.  No associated syncope, chest pain.  Patient usually has morning cough but did not today.  No abdominal pain, nausea or vomiting.  Patient has lower extremity edema that seems little bit worse.  Patient has a history of A-fib for which he is on rate control medication and is anticoagulated.  He notes that he just drove 600 miles from [place] yesterday to a second home here in [place].  All of his primary care and cardiology care is in [place].  Patient was feeling well at that time.      Review of prior external notes (non-ED) -and- Review of prior external test results outside of this encounter:  Cardiology office note 9/2/2021 reviewed: Patient followed for history of hypertension, hyperlipidemia, paroxysmal atrial flutter/fib, COPD and emphysema      PAST MEDICAL HISTORY  Active Ambulatory Problems     Diagnosis Date Noted    Benign essential hypertension 07/24/2016    Chronic obstructive pulmonary disease 07/24/2016    Mixed hyperlipidemia 07/24/2016    Shoulder joint pain 07/24/2016    Subclinical hypothyroidism 07/24/2016    Exertional dyspnea 02/07/2017    Paroxysmal atrial flutter 05/22/2018    PAF (paroxysmal atrial fibrillation) 12/31/2020    PVC (premature ventricular contraction) 12/31/2020     Resolved Ambulatory Problems     Diagnosis Date Noted    Hypertension  2018     Past Medical History:   Diagnosis Date    Abnormal CT scan     Acute back pain     Acute sinusitis     Allergic conjunctivitis     Allergic rhinitis     Bronchitis     COPD (chronic obstructive pulmonary disease)     TRINH (dyspnea on exertion)     Dyspnea     Edema     Emphysema lung     Fatigue     Hyperlipidemia     EDILMA (obstructive sleep apnea)     Pain in joint of left shoulder     Palpitations     Polyp, sigmoid colon     Sleep apnea     SOBOE (shortness of breath on exertion)     Tachycardia     Trigger finger          PAST SURGICAL HISTORY  Past Surgical History:   Procedure Laterality Date    COLONOSCOPY N/A 10/28/2016    Procedure: COLONOSCOPY INTO CECUM WITH POLYPECTOMY X 2;  Surgeon: Carli Khanna MD;  Location: [facility] ENDOSCOPY;  Service:     CYST REMOVAL           FAMILY HISTORY  Family History   Problem Relation Age of Onset    Heart disease Mother     Diabetes Mother     Hypertension Mother     Stroke Mother     Cancer Father     Hypertension Father     Heart disease Brother          SOCIAL HISTORY  Social History     Socioeconomic History    Marital status:    Tobacco Use    Smoking status: Former     Current packs/day: 0.00     Average packs/day: 1.5 packs/day for 35.0 years (52.5 ttl pk-yrs)     Types: Cigarettes     Start date:      Quit date:      Years since quittin.3    Smokeless tobacco: Never    Tobacco comments:     caffeine use   Substance and Sexual Activity    Alcohol use: Not Currently     Comment: 5-7 beer/liquor drinks a week    Drug use: No    Sexual activity: Defer         ALLERGIES  Patient has no known allergies.      REVIEW OF SYSTEMS  Review of Systems  Included in HPI  All systems reviewed and negative except for those discussed in HPI.      PHYSICAL EXAM    I have reviewed the triage vital signs and nursing notes.    ED Triage Vitals   Temp Heart Rate Resp BP SpO2   24    98.3 °F (36.8 °C) (!) 126 20 131/76 94 %      Temp src Heart Rate Source Patient Position BP Location FiO2 (%)   -- -- -- -- --              Physical Exam    Physical Exam   Constitutional: No distress.  Nontoxic  HENT:  Head: Normocephalic and atraumatic.   Oropharynx: Mucous membranes are moist.   Eyes: . No scleral icterus. No conjunctival pallor.  Neck: Normal range of motion. Neck supple.   Cardiovascular: Pink warm and well perfused throughout.  Tachycardic, irregularly irregular  Pulmonary/Chest: Able to speak in reasonably full sentences.  Diminished bilaterally with some mild bibasilar crackles.  Abdominal: Soft. There is no tenderness. There is no rebound and no guarding.   Musculoskeletal: Moves all extremities equally.  3+ pitting edema bilateral lower extremities past the knee.  No calf tenderness.  Neurological: Alert and oriented.  No acute focal deficit appreciated.  Skin: Skin is pink, warm, and dry.   Psychiatric: Mood and affect normal.   Nursing note and vitals reviewed.             LAB RESULTS  Recent Results (from the past 24 hour(s))   ECG 12 Lead ED Triage Standing Order; SOA    Collection Time: 05/02/24  8:35 PM   Result Value Ref Range    QT Interval 324 ms    QTC Interval 475 ms   Comprehensive Metabolic Panel    Collection Time: 05/02/24  9:01 PM    Specimen: Blood   Result Value Ref Range    Glucose 127 (H) 65 - 99 mg/dL    BUN 30 (H) 8 - 23 mg/dL    Creatinine 0.79 0.76 - 1.27 mg/dL    Sodium 138 136 - 145 mmol/L    Potassium 4.3 3.5 - 5.2 mmol/L    Chloride 102 98 - 107 mmol/L    CO2 24.9 22.0 - 29.0 mmol/L    Calcium 8.4 (L) 8.6 - 10.5 mg/dL    Total Protein 5.9 (L) 6.0 - 8.5 g/dL    Albumin 3.1 (L) 3.5 - 5.2 g/dL    ALT (SGPT) 17 1 - 41 U/L    AST (SGOT) 21 1 - 40 U/L    Alkaline Phosphatase 70 39 - 117 U/L    Total Bilirubin 0.8 0.0 - 1.2 mg/dL    Globulin 2.8 gm/dL    A/G Ratio 1.1 g/dL    BUN/Creatinine Ratio 38.0 (H) 7.0 - 25.0    Anion Gap 11.1 5.0 - 15.0 mmol/L    eGFR 86.5  >60.0 mL/min/1.73   BNP    Collection Time: 05/02/24  9:01 PM    Specimen: Blood   Result Value Ref Range    proBNP 738.0 0.0 - 1,800.0 pg/mL   Single High Sensitivity Troponin T    Collection Time: 05/02/24  9:01 PM    Specimen: Blood   Result Value Ref Range    HS Troponin T 22 (H) <22 ng/L   Green Top (Gel)    Collection Time: 05/02/24  9:01 PM   Result Value Ref Range    Extra Tube Hold for add-ons.    Lavender Top    Collection Time: 05/02/24  9:01 PM   Result Value Ref Range    Extra Tube hold for add-on    Gold Top - SST    Collection Time: 05/02/24  9:01 PM   Result Value Ref Range    Extra Tube Hold for add-ons.    Light Blue Top    Collection Time: 05/02/24  9:01 PM   Result Value Ref Range    Extra Tube Hold for add-ons.    CBC Auto Differential    Collection Time: 05/02/24  9:01 PM    Specimen: Blood   Result Value Ref Range    WBC 7.51 3.40 - 10.80 10*3/mm3    RBC 3.65 (L) 4.14 - 5.80 10*6/mm3    Hemoglobin 11.5 (L) 13.0 - 17.7 g/dL    Hematocrit 35.2 (L) 37.5 - 51.0 %    MCV 96.4 79.0 - 97.0 fL    MCH 31.5 26.6 - 33.0 pg    MCHC 32.7 31.5 - 35.7 g/dL    RDW 12.5 12.3 - 15.4 %    RDW-SD 44.4 37.0 - 54.0 fl    MPV 11.6 6.0 - 12.0 fL    Platelets 227 140 - 450 10*3/mm3    Neutrophil % 74.0 42.7 - 76.0 %    Lymphocyte % 13.2 (L) 19.6 - 45.3 %    Monocyte % 11.5 5.0 - 12.0 %    Eosinophil % 0.3 0.3 - 6.2 %    Basophil % 0.3 0.0 - 1.5 %    Immature Grans % 0.7 (H) 0.0 - 0.5 %    Neutrophils, Absolute 5.57 1.70 - 7.00 10*3/mm3    Lymphocytes, Absolute 0.99 0.70 - 3.10 10*3/mm3    Monocytes, Absolute 0.86 0.10 - 0.90 10*3/mm3    Eosinophils, Absolute 0.02 0.00 - 0.40 10*3/mm3    Basophils, Absolute 0.02 0.00 - 0.20 10*3/mm3    Immature Grans, Absolute 0.05 0.00 - 0.05 10*3/mm3    nRBC 0.0 0.0 - 0.2 /100 WBC         RADIOLOGY  XR Chest 1 View    Result Date: 5/2/2024  SINGLE VIEW OF THE CHEST  HISTORY: Shortness of air  COMPARISON: Are 7/2021  FINDINGS: Heart size is within normal limits. There is calcification  of the aorta. There are background emphysematous changes. The patient appears to have chronic scarring at the lung bases. This is more pronounced on the left. No large effusion is seen. No pneumothorax is identified.      No acute findings.  This report was finalized on 5/2/2024 9:50 PM by Dr. Kesha Ga M.D on Workstation: [id number]         MEDICATIONS GIVEN IN ER  Medications   sodium chloride 0.9 % flush 10 mL (has no administration in time range)   dilTIAZem (CARDIZEM) injection 10 mg (10 mg Intravenous Given 5/2/24 2210)   furosemide (LASIX) injection 80 mg (80 mg Intravenous Given 5/2/24 2210)         ORDERS PLACED DURING THIS VISIT:  Orders Placed This Encounter   Procedures    Respiratory Panel PCR w/COVID-19(SARS-CoV-2) JOSE MARTIN/KARI/TED/PAD/COR/JORGE ALBERTO In-House, NP Swab in UTM/VTM, 2 HR TAT - Swab, Nasopharynx    XR Chest 1 View    CT Angiogram Chest Pulmonary Embolism    [place] Draw    Comprehensive Metabolic Panel    BNP    Single High Sensitivity Troponin T    CBC Auto Differential    NPO Diet NPO Type: Strict NPO    Undress & Gown    Continuous Pulse Oximetry    Vital Signs    LHA (on-call MD unless specified) Details    Oxygen Therapy- Nasal Cannula; Titrate 1-6 LPM Per SpO2; 90 - 95%    ECG 12 Lead ED Triage Standing Order; SOA    Insert Peripheral IV    Inpatient Admission    CBC & Differential    Green Top (Gel)    Lavender Top    Gold Top - SST    Light Blue Top         OUTPATIENT MEDICATION MANAGEMENT:  Current Facility-Administered Medications Ordered in Epic   Medication Dose Route Frequency Provider Last Rate Last Admin    levalbuterol (XOPENEX) nebulizer solution 0.63 mg  0.63 mg Nebulization Q6H PRN Padmini Jha MD   0.63 mg at 11/01/19 1503    sodium chloride 0.9 % flush 10 mL  10 mL Intravenous PRN Stu Ruvalcaba MD         Current Outpatient Medications Ordered in Epic   Medication Sig Dispense Refill    atorvastatin (LIPITOR) 20 MG tablet TAKE 1 TABLET EVERY DAY 90 tablet 3     dilTIAZem (CARDIZEM) 30 MG tablet TAKE ONE TABLET BY MOUTH THREE TIMES A  tablet 3    Eliquis 5 MG tablet tablet TAKE 1 TABLET EVERY 12 HOURS (Patient taking differently: Take 1 tablet by mouth Every 12 (Twelve) Hours.) 60 tablet 3    furosemide (LASIX) 20 MG tablet Take 2 tablets by mouth 2 (Two) Times a Day. (Patient taking differently: Take 2 tablets by mouth Daily. Take two tablets (40mg) every morning and one tablet (20mg) every evening) 360 tablet 3    levothyroxine (SYNTHROID, LEVOTHROID) 50 MCG tablet TAKE ONE TABLET BY MOUTH DAILY 90 tablet 3    potassium chloride 10 MEQ CR tablet Take 1 tablet by mouth 1 (One) Time.      tamsulosin (FLOMAX) 0.4 MG capsule 24 hr capsule Take 1 capsule by mouth Daily.      albuterol sulfate  (90 Base) MCG/ACT inhaler INHALE TWO PUFFS BY MOUTH EVERY 6 HOURS AS NEEDED FOR WHEEZING 18 g 4    fluticasone (FLONASE) 50 MCG/ACT nasal spray 2 sprays into the nostril(s) as directed by provider Daily.      fluticasone-salmeterol (ADVAIR) 250-50 MCG/DOSE DISKUS Inhale 1 puff 2 (Two) Times a Day. 60 each 5    ipratropium-albuterol (DUO-NEB) 0.5-2.5 mg/3 ml nebulizer INHALE THE CONTENT OF ONE VIAL VIA NEBULIZER EVERY 4 HOURS AS NEEDED FOR WHEEZING 90 mL 3    losartan (Cozaar) 25 MG tablet Take 1 tablet by mouth Daily. 90 tablet 1    O2 (OXYGEN) Inhale 1 (One) Time. 2.5-3 liters      tiotropium (Spiriva HandiHaler) 18 MCG per inhalation capsule Place 1 capsule into inhaler and inhale Daily. 30 capsule 2    vitamin B-12 (CYANOCOBALAMIN) 100 MCG tablet Take  by mouth Daily.           PROCEDURES  Procedures      Total critical care time: Approximately 35 minutes    Due to a high probability of clinically significant, life threatening deterioration, the patient required my highest level of preparedness to intervene emergently and I personally spent this critical care time directly and personally managing the patient. This critical care time included obtaining a history;  examining the patient; vital sign monitoring; ordering and review of studies; arranging urgent treatment with development of a management plan; evaluation of patient's response to treatment; frequent reassessment; and, discussions with other providers.    This critical care time was performed to assess and manage the high probability of imminent, life-threatening deterioration that could result in multi-organ failure. It was exclusive of separately billable procedures and treating other patients and teaching time.    Please see MDM section and the rest of the note for further information on patient assessment and treatment.        PROGRESS, DATA ANALYSIS, CONSULTS, AND MEDICAL DECISION MAKING  All labs have been independently interpreted by me.  All radiology studies have been reviewed by me. All EKG's have been independently viewed and interpreted by me.  Discussion below represents my analysis of pertinent findings related to patient's condition, differential diagnosis, treatment plan and final disposition.    Differential diagnosis:   My differential diagnosis for dyspnea includes but is not limited to:  Asthma, COPD, pneumonia, pulmonary embolus, acute respiratory distress syndrome, pneumothorax, pleural effusion, pulmonary fibrosis, congestive heart failure, myocardial infarction, DKA, uremia, acidosis, sepsis, anemia, drug related, hyperventilation, CNS disease      Clinical Scores:                  ED Course as of 05/02/24 2223   Thu May 02, 2024   2101 EKG           EKG time: 2035  Rhythm/Rate: A-fib, 130  P waves and MI: N/A  QRS, axis: Narrow complex with slow R wave progression  ST and T waves: No STEMI; QTc within normal limits    Interpreted Contemporaneously by me, independently viewed  Comparison: 8/7/2021-previously noted A-fib with RVR as well   [RS]   2129 RADIOLOGY      Study: Single view chest  Findings: No pneumothorax or focal infiltrate.  Perhaps some mild vascular distention  I independently  viewed and interpreted these images contemporaneously with treatment.    [RS]   2150 WBC: 7.51 [RS]   2150 Hemoglobin(!): 11.5 [RS]   2150 Immature Grans, Absolute: 0.05 [RS]   2150 Glucose(!): 127 [RS]   2150 BUN(!): 30 [RS]   2150 Creatinine: 0.79 [RS]   2150 Sodium: 138 [RS]   2150 Potassium: 4.3 [RS]   2150 ALT (SGPT): 17 [RS]   2150 AST (SGOT): 21 [RS]   2150 Total Bilirubin: 0.8 [RS]   2150 HS Troponin T(!): 22 [RS]   2150 proBNP: 738.0 [RS]   2153 Patient certainly appears to be suffering from decompensated congestive heart failure likely secondary to his A-fib with RVR.  Will order some rate control.  He is already on diltiazem orally thus I will give him a small IV bolus and monitor for response as well as blood pressure changes.  Also we will give him a dose of IV furosemide for his edema.  Plan admission.  Patient agreeable.  I have turned his supplemental oxygen back down from 5 L to 2 L by nasal cannula as his home dose is 2 L and he was 100% on the 5 L.  We will monitor closely. [RS]   2200 I have however ordered a CTA of the chest given the patient's recent long distance travel and potential for Eliquis failure.  Patient agreeable. [RS]   2222 CONSULT        Provider: NILA Griffin    Discussion: Reviewed patient history, ED presentation and evaluation.  Discussed pending CT angiography of chest as well.  Agreeable to accept patient for full admission with telemetry on behalf of Dr. Staton at this time.    Agreeable c treatment and planned disposition.         [RS]      ED Course User Index  [RS] Stu Ruvalcaba MD         Prescription drug monitoring program review:     AS OF 22:23 EDT VITALS:    BP - 106/75  HR - 112  TEMP - 98.3 °F (36.8 °C)  O2 SATS - 100%    COMPLEXITY OF CARE  The patient requires admission.      DIAGNOSIS  Final diagnoses:   Acute on chronic respiratory failure with hypoxia   Atrial fibrillation with rapid ventricular response   Acute congestive heart failure, unspecified  heart failure type   Chronic anticoagulation         DISPOSITION  ED Disposition       ED Disposition   Decision to Admit    Condition   --    Comment   Level of Care: Telemetry [5]   Diagnosis: Atrial fibrillation with rapid ventricular response [[id number]]   Admitting Physician: AIDEN RUBIN [[id number]]   Certification: I Certify That Inpatient Hospital Services Are Medically Necessary For Greater Than 2 Midnights                    ADMISSION    Discussed treatment plan and reason for admission with pt/family and admitting physician.  Pt/family voiced understanding of the plan for admission for further testing/treatment as needed.       Please note that portions of this document were completed with a voice recognition program.    Note Disclaimer: At [facility], we believe that sharing information builds trust and better relationships. You are receiving this note because you recently visited [facility]. It is possible you will see health information before a provider has talked with you about it. This kind of information can be easy to misunderstand. To help you fully understand what it means for your health, we urge you to discuss this note with your provider.         Stu Ruvalcaba MD  05/02/24 [id number]

## 2024-05-03 NOTE — H&P (VIEW-ONLY)
Gastroenterology   Initial Inpatient Consult Note  Thank you for asking opinion on this patient.  Referring Provider: RENETTA Kamara MD    Reason for Consultation: GI bleed    Subjective     History of present illness:    88 y.o. male that we are asked to see for GI bleed.  He apparently recently came back to [place] from his home in [place] and had been feeling fine until yesterday when he began experiencing acute shortness of breath with exertion.  After evaluation in the ER, he went for a CT angiogram and apparently during this imaging study he had an episode of coffee ground emesis with another episode apparently in the ER.  This morning, he had a large melanotic stool which I visualized.  He denies any abdominal pain.  No prior history of any GI bleeding.  He is on Eliquis for atrial fibrillation.  This morning, he is in atrial fibrillation with heart rate in the 120s to 130s.    Past Medical History:  Past Medical History:   Diagnosis Date    Abnormal CT scan     Acute back pain     Acute sinusitis     Allergic conjunctivitis     Allergic rhinitis     Benign essential hypertension     Bronchitis     COPD (chronic obstructive pulmonary disease)     TRINH (dyspnea on exertion)     Dyspnea     Edema     Emphysema lung     Fatigue     Hyperlipidemia     Hypertension     EDILMA (obstructive sleep apnea)     Pain in joint of left shoulder     Palpitations     Polyp, sigmoid colon     Sleep apnea     SOBOE (shortness of breath on exertion)     Subclinical hypothyroidism     Tachycardia     sudden    Trigger finger      Past Surgical History:  Past Surgical History:   Procedure Laterality Date    COLONOSCOPY N/A 10/28/2016    Procedure: COLONOSCOPY INTO CECUM WITH POLYPECTOMY X 2;  Surgeon: Carli Khanna MD;  Location: [facility] ENDOSCOPY;  Service:     CYST REMOVAL  1960      Social History:   Social History     Tobacco Use    Smoking status: Former     Current packs/day: 0.00     Average packs/day: 1.5 packs/day for  35.0 years (52.5 ttl pk-yrs)     Types: Cigarettes     Start date:      Quit date:      Years since quittin.3    Smokeless tobacco: Never    Tobacco comments:     caffeine use   Substance Use Topics    Alcohol use: Not Currently     Comment: 5-7 beer/liquor drinks a week      Family History:  Family History   Problem Relation Age of Onset    Heart disease Mother     Diabetes Mother     Hypertension Mother     Stroke Mother     Cancer Father     Hypertension Father     Heart disease Brother        Home Meds:  Facility-Administered Medications Prior to Admission   Medication Dose Route Frequency Provider Last Rate Last Admin    levalbuterol (XOPENEX) nebulizer solution 0.63 mg  0.63 mg Nebulization Q6H PRN Padmini Jha MD   0.63 mg at 19 1503     Medications Prior to Admission   Medication Sig Dispense Refill Last Dose    atorvastatin (LIPITOR) 20 MG tablet TAKE 1 TABLET EVERY DAY 90 tablet 3 2024    dilTIAZem (CARDIZEM) 30 MG tablet TAKE ONE TABLET BY MOUTH THREE TIMES A  tablet 3 2024    Eliquis 5 MG tablet tablet TAKE 1 TABLET EVERY 12 HOURS (Patient taking differently: Take 1 tablet by mouth Every 12 (Twelve) Hours.) 60 tablet 3 2024    furosemide (LASIX) 20 MG tablet Take 2 tablets by mouth 2 (Two) Times a Day. (Patient taking differently: Take 2 tablets by mouth Daily. Take two tablets (40mg) every morning and one tablet (20mg) every evening) 360 tablet 3 2024    levothyroxine (SYNTHROID, LEVOTHROID) 50 MCG tablet TAKE ONE TABLET BY MOUTH DAILY 90 tablet 3 2024    potassium chloride 10 MEQ CR tablet Take 1 tablet by mouth 1 (One) Time.   2024    tamsulosin (FLOMAX) 0.4 MG capsule 24 hr capsule Take 1 capsule by mouth Daily.   2024    albuterol sulfate  (90 Base) MCG/ACT inhaler INHALE TWO PUFFS BY MOUTH EVERY 6 HOURS AS NEEDED FOR WHEEZING 18 g 4     fluticasone (FLONASE) 50 MCG/ACT nasal spray 2 sprays into the nostril(s) as directed by provider  Daily.       fluticasone-salmeterol (ADVAIR) 250-50 MCG/DOSE DISKUS Inhale 1 puff 2 (Two) Times a Day. 60 each 5     ipratropium-albuterol (DUO-NEB) 0.5-2.5 mg/3 ml nebulizer INHALE THE CONTENT OF ONE VIAL VIA NEBULIZER EVERY 4 HOURS AS NEEDED FOR WHEEZING 90 mL 3     losartan (Cozaar) 25 MG tablet Take 1 tablet by mouth Daily. 90 tablet 1     O2 (OXYGEN) Inhale 1 (One) Time. 2.5-3 liters       tiotropium (Spiriva HandiHaler) 18 MCG per inhalation capsule Place 1 capsule into inhaler and inhale Daily. 30 capsule 2     vitamin B-12 (CYANOCOBALAMIN) 100 MCG tablet Take  by mouth Daily.        Current Meds:   cefTRIAXone, 2,000 mg, Intravenous, Q24H  furosemide, 40 mg, Intravenous, BID  pantoprazole, 80 mg, Intravenous, Once  sodium chloride, 10 mL, Intravenous, Q12H      Allergies:  No Known Allergies  Review of Systems  Pertinent items are noted in HPI, all other systems reviewed and negative    Objective     Vital Signs  Temp:  [97.5 °F (36.4 °C)-98.3 °F (36.8 °C)] 97.5 °F (36.4 °C)  Heart Rate:  [112-139] 129  Resp:  [18-20] 18  BP: ()/(56-81) 102/60    Physical Exam:  CONSTITUTIONAL:  today's vital signs reviewed  EARS NOSE THROAT: trachea midline and no deformity of the nares  EYES: no scleral icterus  GASTROINTESTINAL: abdomen is soft, nontender, nondistended with normal active bowel sounds, no masses are appreciated  PSYCHIATRIC: appropriate mood and affect  RESPIRATORY: normal inspiratory effort with no increased work of breathing  NEUROLOGIC: patient is awake and alert  DERMATOLOGIC: skin is warm with no cyanosis  LYMPHATIC: no periumbilical lymphadenopathy     Results Review:   I reviewed the patient's new clinical results.    Results from last 7 days   Lab Units 05/03/24  0621 05/02/24  2101   WBC 10*3/mm3 14.41* 7.51   HEMOGLOBIN g/dL 9.5* 11.5*   HEMATOCRIT % 27.9* 35.2*   PLATELETS 10*3/mm3 201 227     Results from last 7 days   Lab Units 05/03/24  0621 05/02/24  2101   SODIUM mmol/L 140 138  "  POTASSIUM mmol/L 4.0 4.3   CHLORIDE mmol/L 106 102   CO2 mmol/L 26.0 24.9   BUN mg/dL 35* 30*   CREATININE mg/dL 0.70* 0.79   CALCIUM mg/dL 8.1* 8.4*   BILIRUBIN mg/dL  --  0.8   ALK PHOS U/L  --  70   ALT (SGPT) U/L  --  17   AST (SGOT) U/L  --  21   GLUCOSE mg/dL 143* 127*     Results from last 7 days   Lab Units 05/02/24  2101   INR  1.45*     No results found for: \"LIPASE\"    Radiology:  CT Angiogram Chest Pulmonary Embolism   Final Result   The patient has background fibrotic changes. There may be some   increasing consolidation within the left lower lobe when compared to the   prior exam. Some of this may reflect some progression of fibrotic   change, although a superimposed infiltrate is not excluded. There is   also some increasing bronchial wall thickening, particularly within the   lower lobes. Bronchitis is not excluded.       Radiation dose reduction techniques were utilized, including automated   exposure control and exposure modulation based on body size.           This report was finalized on 5/3/2024 12:58 AM by Dr. Kesha Ga M.D on Workstation: [employer]          XR Chest 1 View   Final Result   No acute findings.       This report was finalized on 5/2/2024 9:50 PM by Dr. Kesha Ga M.D on Workstation: [employer]              Assessment & Plan   Active Hospital Problems    Diagnosis     **Atrial fibrillation with rapid ventricular response     Hematemesis     PAF (paroxysmal atrial fibrillation)     Benign essential hypertension        Assessment:  GI bleed.  Patient has new onset of melanotic stools as well as at least a couple of episodes of hematemesis.  No prior history of ulcer disease.  CT angiogram does suggest possibility of cirrhosis but no history of varices.  Atrial fibrillation with RVR on Eliquis current heart rate in the 120s to 130s  History of hypertension  Questionable cirrhotic changes on CT imaging of the chest but the platelet count is normal and liver " enzymes and bilirubin are normal.  Anticoagulation on Eliquis with mildly prolonged prothrombin time    Plan:  Agree with Protonix drip  Would type and cross and hold at least 2 units of packed red blood cells  Cardiology consult to help with the atrial fibrillation with RVR  Hold Eliquis  Keep n.p.o.  Patient will need upper endoscopy with the timing dependent on his clinical course as it relates to his A-fib with RVR.  Due to bleeding and his comorbidities, would not hesitate to transfer patient to ICU for further care until he stabilizes.  Serial H&H      I discussed the patients findings and my recommendations with patient and nursing staff.    MD Brandon Rice M.D.  [facility] [address]  Office: [phone]

## 2024-05-03 NOTE — CONSULTS
"Neurology Consult Note    Consult Date: 5/3/2024    Referring MD: Rivas Staton MD    Reason for Consult I have been asked to see the patient in neurological consultation to render advice and opinion regarding seizure    Cuauhtemoc Buck is a 88 y.o. male with hypertension, COPD, EDILMA, A-fib on Eliquis.  He took a long drive from [place] to [place].  He was admitted for shortness of air.  In the ED he had A-fib with RVR and lower extremity edema.  CTA was done and he had a convulsion with associated urinary incontinence.  He also developed hematemesis.  I was consulted for suspected seizure.  Patient denies a prior history of epilepsy.    Past Medical History:   Diagnosis Date    Abnormal CT scan     Acute back pain     Acute sinusitis     Allergic conjunctivitis     Allergic rhinitis     Benign essential hypertension     Bronchitis     COPD (chronic obstructive pulmonary disease)     TRINH (dyspnea on exertion)     Dyspnea     Edema     Emphysema lung     Fatigue     Hyperlipidemia     Hypertension     EDILMA (obstructive sleep apnea)     Pain in joint of left shoulder     Palpitations     Polyp, sigmoid colon     Sleep apnea     SOBOE (shortness of breath on exertion)     Subclinical hypothyroidism     Tachycardia     sudden    Trigger finger        Exam  /68   Pulse 111   Temp 98.6 °F (37 °C) (Oral)   Resp 24   Ht 177.8 cm (70\")   Wt 81.6 kg (180 lb)   SpO2 97%   BMI 25.83 kg/m²   Gen: NAD, vitals reviewed  MS: oriented x3, recent/remote memory impaired, normal attention/concentration, language intact, no neglect.  CN: visual acuity grossly normal, PERRL, EOMI, no facial droop, no dysarthria  Motor: 5/5 throughout upper and lower extremities, normal tone    DATA:    Lab Results   Component Value Date    GLUCOSE 143 (H) 05/03/2024    CALCIUM 8.1 (L) 05/03/2024     05/03/2024    K 4.0 05/03/2024    CO2 26.0 05/03/2024     05/03/2024    BUN 35 (H) 05/03/2024    CREATININE 0.70 (L) 05/03/2024    " EGFRIFAFRI 112 06/10/2021    EGFRIFNONA 78 12/28/2021    BCR 50.0 (H) 05/03/2024    ANIONGAP 8.0 05/03/2024     Lab Results   Component Value Date    WBC 14.41 (H) 05/03/2024    HGB 9.2 (L) 05/03/2024    HCT 28.8 (L) 05/03/2024    MCV 94.3 05/03/2024     05/03/2024       Lab review: WBC 14, BMP unremarkable    Imaging review: MRI brain without contrast ordered    Routine EEG.    Diagnoses:  New onset seizure  Paroxysmal atrial fibrillation anticoagulated with Eliquis  Hematemesis    Comment: Probable generalized seizure.  I suspect this may have been provoked but the provoking factor is unclear.  As a precaution I think he should be worked up for new onset epilepsy with MRI and EEG.  If the studies are negative I would not start seizure medication    PLAN:  Due to generalized seizure no driving for 90 days per [place] state law  Hold AEDs for now  MRI/EEG

## 2024-05-03 NOTE — CONSULTS
Patient Care Team:  System, Provider Not In as PCP - General  Peggy Trejo MD as Consulting Physician (Cardiology)      Subjective     Patient is a 88 y.o. male.  Asked to see for acute hypoxic respiratory failure.  He is a former 50+ pack year smoker who quit smoking about 19 years ago history of COPD, obstructive sleep apnea, pulmonary fibrosis and chronic respiratory failure uses 3 L O2 at home.  He has history of hypertension hyperlipidemia who presented with shortness of breath the emergency department yesterday he lives part-time in [place] and part-time here in [place] he drove in about 600 miles yesterday he has atrial fibrillation and is chronically anticoagulated and was found to be in A-fib with rapid ventricular response.  He had a CT angiogram yesterday no evidence of PE there is some bibasilar fibrotic changes and there is actually some traction bronchiectasis on my review of that CT looking back on CT of the chest a couple years prior he had some fibrosis then it is not as prominent.  There is a little increased markings in particular the left base not clear whether this is acute or chronic.  The fibrosis pattern is an indeterminate UIP pattern I do not see a lot of groundglass type opacities either.  Patient has had no fevers chills or sweats he has had increasing lower extremity edema for about 2 weeks.  He has had his heart intermittently racing for at least a week or so.  He is not having any chest pain or pressure.  Has been getting progressively more short of breath.  He has had no cough or wheeze.  This morning he threw up a bunch of black emesis and he is having black tarry stools he has not had any of those until his arrival here in the hospital.  Patient does use a home PAP machine with sleep and he will have family bring it in he uses O2 he says he does not use it all the time but he uses somewhere around 2 to 2-1/2 L.  Patient has been told he has had some scarring in  his lung he has never been told because.  He does have quite a bit of heartburn indigestion reflux he also has problems if he eats too fast choking on food.  He was a  he did not have any other than his cigarette smoking significant dust or fume exposures.  No autoimmune connective tissue diseases that he is aware of.  No history of ulcers or liver disease.  He has been a drink a day person in the past but has not been drinking alcohol recently        Review of Systems:  No headaches or acute visual changes no dysuria or hematuria no polyuria polydipsia heat or cold intolerance      History  Past Medical History:   Diagnosis Date    Abnormal CT scan     Acute back pain     Acute sinusitis     Allergic conjunctivitis     Allergic rhinitis     Benign essential hypertension     Bronchitis     COPD (chronic obstructive pulmonary disease)     TRINH (dyspnea on exertion)     Dyspnea     Edema     Emphysema lung     Fatigue     Hyperlipidemia     Hypertension     EDILMA (obstructive sleep apnea)     Pain in joint of left shoulder     Palpitations     Polyp, sigmoid colon     Sleep apnea     SOBOE (shortness of breath on exertion)     Subclinical hypothyroidism     Tachycardia     sudden    Trigger finger      Past Surgical History:   Procedure Laterality Date    COLONOSCOPY N/A 10/28/2016    Procedure: COLONOSCOPY INTO CECUM WITH POLYPECTOMY X 2;  Surgeon: Carli Khanna MD;  Location: [facility] ENDOSCOPY;  Service:     CYST REMOVAL       Social History     Socioeconomic History    Marital status:    Tobacco Use    Smoking status: Former     Current packs/day: 0.00     Average packs/day: 1.5 packs/day for 35.0 years (52.5 ttl pk-yrs)     Types: Cigarettes     Start date:      Quit date:      Years since quittin.3    Smokeless tobacco: Never    Tobacco comments:     caffeine use   Vaping Use    Vaping status: Never Used   Substance and Sexual Activity    Alcohol use: Not Currently      Comment: 5-7 beer/liquor drinks a week    Drug use: No    Sexual activity: Defer     Family History   Problem Relation Age of Onset    Heart disease Mother     Diabetes Mother     Hypertension Mother     Stroke Mother     Cancer Father     Hypertension Father     Heart disease Brother          Allergies:  Patient has no known allergies.    Medications:  Prior to Admission medications    Medication Sig Start Date End Date Taking? Authorizing Provider   atorvastatin (LIPITOR) 20 MG tablet TAKE 1 TABLET EVERY DAY 10/1/21  Yes Padmini Jha MD   dilTIAZem (CARDIZEM) 30 MG tablet TAKE ONE TABLET BY MOUTH THREE TIMES A DAY 8/10/21  Yes Bijal [place] DNP, APRSOLO   Eliquis 5 MG tablet tablet TAKE 1 TABLET EVERY 12 HOURS  Patient taking differently: Take 1 tablet by mouth Every 12 (Twelve) Hours. 2/21/23  Yes Brianne Peña APRN   furosemide (LASIX) 20 MG tablet Take 2 tablets by mouth 2 (Two) Times a Day.  Patient taking differently: Take 2 tablets by mouth Daily. Take two tablets (40mg) every morning and one tablet (20mg) every evening 6/21/21  Yes Padmini Jha MD   levothyroxine (SYNTHROID, LEVOTHROID) 50 MCG tablet TAKE ONE TABLET BY MOUTH DAILY 8/9/21  Yes Padmini Jha MD   potassium chloride 10 MEQ CR tablet Take 1 tablet by mouth 1 (One) Time.   Yes ProviderJune MD   tamsulosin (FLOMAX) 0.4 MG capsule 24 hr capsule Take 1 capsule by mouth Daily.   Yes ProviderJune MD   albuterol sulfate  (90 Base) MCG/ACT inhaler INHALE TWO PUFFS BY MOUTH EVERY 6 HOURS AS NEEDED FOR WHEEZING 1/28/21   Padmini Jha MD   fluticasone (FLONASE) 50 MCG/ACT nasal spray 2 sprays into the nostril(s) as directed by provider Daily.    June Horne MD   fluticasone-salmeterol (ADVAIR) 250-50 MCG/DOSE DISKUS Inhale 1 puff 2 (Two) Times a Day. 9/9/21   Padmini Jha MD   ipratropium-albuterol (DUO-NEB) 0.5-2.5 mg/3 ml nebulizer INHALE THE CONTENT OF ONE VIAL VIA NEBULIZER EVERY 4 HOURS AS NEEDED  "FOR WHEEZING 2/26/21   Padmini Jha MD   losartan (Cozaar) 25 MG tablet Take 1 tablet by mouth Daily. 10/12/21   Padmini Jha MD   O2 (OXYGEN) Inhale 1 (One) Time. 2.5-3 liters    June Horne MD   tiotropium (Spiriva HandiHaler) 18 MCG per inhalation capsule Place 1 capsule into inhaler and inhale Daily. 11/22/21   Padmini Jha MD   vitamin B-12 (CYANOCOBALAMIN) 100 MCG tablet Take  by mouth Daily. 5/7/13   June Horne MD     atorvastatin, 20 mg, Oral, Daily  cefTRIAXone, 2,000 mg, Intravenous, Q24H  dilTIAZem, 30 mg, Oral, Q8H  furosemide, 40 mg, Intravenous, BID  levothyroxine, 50 mcg, Oral, Daily  pantoprazole, 80 mg, Intravenous, Once  sodium chloride, 10 mL, Intravenous, Q12H  tamsulosin, 0.4 mg, Oral, Daily  tiotropium bromide monohydrate, 2 puff, Inhalation, Daily - RT  vitamin B-12, 100 mcg, Oral, Daily      pantoprazole, 40 mg, Last Rate: 40 mg (05/03/24 0805)        Objective     Vital Signs  Vital Sign Min/Max for last 24 hours  Temp  Min: 97.5 °F (36.4 °C)  Max: 98.3 °F (36.8 °C)   BP  Min: 89/73  Max: 131/76   Pulse  Min: 112  Max: 139   Resp  Min: 18  Max: 20   SpO2  Min: 89 %  Max: 100 %   Flow (L/min)  Min: 2  Max: 5   Weight  Min: 81.6 kg (180 lb)  Max: 81.6 kg (180 lb)       Intake/Output Summary (Last 24 hours) at 5/3/2024 1008  Last data filed at 5/3/2024 0126  Gross per 24 hour   Intake --   Output 1600 ml   Net -1600 ml     No intake/output data recorded.  Last Weight and Admission Weight        05/02/24 2055   Weight: 81.6 kg (180 lb)     Flowsheet Rows      Flowsheet Row First Filed Value   Admission Height 177.8 cm (70\") Documented at 05/02/2024 2055   Admission Weight 81.6 kg (180 lb) Documented at 05/02/2024 2055            Body mass index is 25.83 kg/m².           Physical Exam:  General Appearance: We have a well-developed elderly white male he is lying in bed he does not look in acute distress but he does not look chronically healthy either.  Eyes: " Conjunctiva are clear and anicteric pupils are equal  ENT: Mucous membranes are dry he has a lot of dried black material on his teeth and tongue.  It looks like old blood.  I do not see any nasal sores or evidence of epistaxis.  Neck: Trachea midline no palpable adenopathy or thyromegaly no jugular venous distention  Lungs: He has a few little crackles in the bases bilaterally no wheezes symmetric nonlabored expansion  Cardiac: Irregularly irregular heart rates in the 130s right now he is A-fib on the monitor  Abdomen: Soft flat nontender no palpable hepatosplenomegaly or masses I did see a stool that is melanic  : Normal external male genitalia  Musculoskeletal: Grossly normal  Skin: Warm and dry no jaundice no petechiae  Neuro: He is a little hard of hearing but he is alert and oriented cooperative following commands moving all extremities well  Extremities/P Vascular: No clubbing no cyanosis he does have at least 2-3+ pitting pretibial edema bilaterally with palpable radial and dorsalis pedis pulses  MSE: Pleasant gentleman appropriate      Labs:  Results from last 7 days   Lab Units 05/03/24  0621 05/02/24  2101   GLUCOSE mg/dL 143* 127*   SODIUM mmol/L 140 138   POTASSIUM mmol/L 4.0 4.3   MAGNESIUM mg/dL 2.1  --    CO2 mmol/L 26.0 24.9   CHLORIDE mmol/L 106 102   ANION GAP mmol/L 8.0 11.1   CREATININE mg/dL 0.70* 0.79   BUN mg/dL 35* 30*   BUN / CREAT RATIO  50.0* 38.0*   CALCIUM mg/dL 8.1* 8.4*   ALK PHOS U/L  --  70   TOTAL PROTEIN g/dL  --  5.9*   ALT (SGPT) U/L  --  17   AST (SGOT) U/L  --  21   BILIRUBIN mg/dL  --  0.8   ALBUMIN g/dL  --  3.1*   GLOBULIN gm/dL  --  2.8     Estimated Creatinine Clearance: 87.4 mL/min (A) (by C-G formula based on SCr of 0.7 mg/dL (L)).      Results from last 7 days   Lab Units 05/03/24  0621 05/02/24  2101   WBC 10*3/mm3 14.41* 7.51   RBC 10*6/mm3 2.96* 3.65*   HEMOGLOBIN g/dL 9.5* 11.5*   HEMATOCRIT % 27.9* 35.2*   MCV fL 94.3 96.4   MCH pg 32.1 31.5   MCHC g/dL 34.1 32.7    RDW % 12.3 12.5   RDW-SD fl 42.3 44.4   MPV fL 11.9 11.6   PLATELETS 10*3/mm3 201 227   NEUTROPHIL % %  --  74.0   LYMPHOCYTE % %  --  13.2*   MONOCYTES % %  --  11.5   EOSINOPHIL % %  --  0.3   BASOPHIL % %  --  0.3   IMM GRAN % %  --  0.7*   NEUTROS ABS 10*3/mm3  --  5.57   LYMPHS ABS 10*3/mm3  --  0.99   MONOS ABS 10*3/mm3  --  0.86   EOS ABS 10*3/mm3  --  0.02   BASOS ABS 10*3/mm3  --  0.02   IMMATURE GRANS (ABS) 10*3/mm3  --  0.05   NRBC /100 WBC  --  0.0         Results from last 7 days   Lab Units 05/02/24  2101   HSTROP T ng/L 22*     Results from last 7 days   Lab Units 05/02/24  2101   PROBNP pg/mL 738.0         Results from last 7 days   Lab Units 05/03/24  0621   PROCALCITONIN ng/mL <0.02     Results from last 7 days   Lab Units 05/02/24  2101   INR  1.45*     Microbiology Results (last 10 days)       Procedure Component Value - Date/Time    Respiratory Panel PCR w/COVID-19(SARS-CoV-2) JOSE MARTIN/KARI/TDE/PAD/COR/JORGE ALBERTO In-House, NP Swab in UTM/VTM, 2 HR TAT - Swab, Nasopharynx [[id number]]  (Normal) Collected: 05/02/24 2217    Lab Status: Final result Specimen: Swab from Nasopharynx Updated: 05/02/24 0297     ADENOVIRUS, PCR Not Detected     Coronavirus 229E Not Detected     Coronavirus HKU1 Not Detected     Coronavirus NL63 Not Detected     Coronavirus OC43 Not Detected     COVID19 Not Detected     Human Metapneumovirus Not Detected     Human Rhinovirus/Enterovirus Not Detected     Influenza A PCR Not Detected     Influenza B PCR Not Detected     Parainfluenza Virus 1 Not Detected     Parainfluenza Virus 2 Not Detected     Parainfluenza Virus 3 Not Detected     Parainfluenza Virus 4 Not Detected     RSV, PCR Not Detected     Bordetella pertussis pcr Not Detected     Bordetella parapertussis PCR Not Detected     Chlamydophila pneumoniae PCR Not Detected     Mycoplasma pneumo by PCR Not Detected    Narrative:      In the setting of a positive respiratory panel with a viral infection PLUS a negative procalcitonin  without other underlying concern for bacterial infection, consider observing off antibiotics or discontinuation of antibiotics and continue supportive care. If the respiratory panel is positive for atypical bacterial infection (Bordetella pertussis, Chlamydophila pneumoniae, or Mycoplasma pneumoniae), consider antibiotic de-escalation to target atypical bacterial infection.              Diagnostics:  CT Angiogram Chest Pulmonary Embolism    Result Date: 5/3/2024  CT ANGIOGRAM CHEST  HISTORY: Lower extremity edema and dyspnea  COMPARISON: February 7, 2020  TECHNIQUE: Axial CT imaging was obtained through the thorax. IV contrast was administered. Three-D reformatted images were obtained.  FINDINGS: No acute pulmonary thromboembolus is seen. Examination was not optimized for assessment of the thoracic aorta. There is some dilatation of the proximal descending aorta, measuring up to 3.0 cm. Distal aorta measures 2.5 cm. Obvious dissection is not seen. Thyroid gland, trachea, and esophagus appear normal. There are coronary artery calcifications, as well as dystrophic calcifications of the mitral valve annulus. There are background emphysematous changes. There is biapical scarring. Patient is again noted to have some fibrotic changes at the lung bases, including interlobular septal thickening and peripheral reticulation. There were similar findings in February 2020. There may be slightly more consolidation at the left lung base when compared to prior exam. It is unclear if this reflects some progression of fibrotic change, or a superimposed infectious or inflammatory process. Bronchial wall thickening is more apparent on the current study, suggesting bronchitis. Images through the upper abdomen suggest some nodularity to the contour of the liver, as well as relative enlargement of the caudate lobe relative to the right hepatic lobe, suggesting cirrhosis. The pancreas is atrophic. There are aortoiliac calcifications. No  acute osseous abnormalities are seen.      The patient has background fibrotic changes. There may be some increasing consolidation within the left lower lobe when compared to the prior exam. Some of this may reflect some progression of fibrotic change, although a superimposed infiltrate is not excluded. There is also some increasing bronchial wall thickening, particularly within the lower lobes. Bronchitis is not excluded.  Radiation dose reduction techniques were utilized, including automated exposure control and exposure modulation based on body size.   This report was finalized on 5/3/2024 12:58 AM by Dr. Kesha Ga M.D on Workstation: [id number]      XR Chest 1 View    Result Date: 5/2/2024  SINGLE VIEW OF THE CHEST  HISTORY: Shortness of air  COMPARISON: Are 7/2021  FINDINGS: Heart size is within normal limits. There is calcification of the aorta. There are background emphysematous changes. The patient appears to have chronic scarring at the lung bases. This is more pronounced on the left. No large effusion is seen. No pneumothorax is identified.      No acute findings.  This report was finalized on 5/2/2024 9:50 PM by Dr. Kesha Ga M.D on Workstation: [id number]      Results for orders placed in visit on 12/31/20    Adult Transthoracic Echo Complete w/ Color, Spectral and Contrast if Necessary Per Protocol    Interpretation Summary  · Left ventricular wall thickness is consistent with concentric hypertrophy.  · Estimated left ventricular EF = 65% Left ventricular systolic function is normal.  · Left ventricular diastolic function was normal.  · Moderate mitral annular calcification is present.  · Mild tricuspid valve regurgitation is present. Estimated right ventricular systolic pressure from tricuspid regurgitation is mildly elevated (35-45 mmHg). Calculated right ventricular systolic pressure from tricuspid regurgitation is 40 mmHg.      CT scan reviewed as above    Assessment & Plan      A-fib with rapid ventricular response management per cardiology  Upper GI bleed with hematemesis coffee-ground/black material and melanic stools patient on PPI drip GI is seeing him workup planned  Acute blood loss anemia hemoglobin down he will need to be monitored closely  Respiratory failure acute on chronic they have had to turn him up to about 4 L O2 I think he may have aspirated a little with his emesis earlier today we will have to be vigilant for signs of infection, it sounds like his oxygenation is improving already we will wean O2 and continue to monitor, treat his underlying lung disease as  COPD he has a history of COPD uses Spiriva and Advair for maintenance with as needed albuterol probably need to continue similar formulary we can use Symbicort instead of Advair and continue Spiriva as needed short acting bronchodilator.  Pulmonary fibrosis he has some bibasilar scarring with some traction bronchiectasis the pattern is not classic UIP pattern I do not see a lot of groundglass type opacities either to suggest an NSIP it is bibasilar this has the appearance of more of a chronic inflammatory process I wonder if this is due to recurrent aspiration.  Is something he will certainly need to follow, I do not any history of significant exposures to explain this or any autoimmune connective tissue type diseases.  Obstructive sleep apnea he will have his family bring in his home PAP machine to use.  Of course we will use that if he is got nausea or vomiting but thankfully he is alert enough if he has some he can pull the mask off.  Pulmonary hypertension was mild by remote echocardiogram 4 years ago on his CT scan I measure his PA just slightly larger than his aorta although I measure the RV smaller than the LV.  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

## 2024-05-03 NOTE — ED NOTES
"Pt was taken to CT for a CTA and this RN was called by another RN and was told that pt appeared to have had a seizure on the CT table. Pt has no prior hx of seizures. CT techs stated that the patient was \"very tight and urinated on himself but came out of it quick.\" When this RN went into the exam room pt was AxO x4 and was c/o being hot. The pt then sat up on the CT table and started vomiting blood/clots. This RN called MD Phillips and informed MD of what was going on and administered Zofran as ordered. Pt then stated he felt fine to lay for the CTA. This RN along with another RN wheeled pt back to his room in the ER when pt started vomiting blood again. Pt was hooked back up to the monitor and his vitals were stable aside from his HR being tachycardic but is on trend since his time in the ER.  "

## 2024-05-03 NOTE — H&P
Patient Name:  Cuauhtemoc Buck  YOB: 1935  MRN:  [id number]  Admit Date:  5/2/2024  Patient Care Team:  System, Provider Not In as PCP - General  Peggy Trejo MD as Consulting Physician (Cardiology)      Subjective   History Present Illness     Chief Complaint   Patient presents with    Shortness of Breath         History of Present Illness  Patient is a 88-year-old male with a history of including but not limited to paroxysmal atrial flutter/A-fib on Eliquis, hypertension, COPD, presented to the ED complaining of worsening shortness of breath and lower extremity edema.  Patient reports for the significant baseline, which is worse with minimal exertion.  Of note patient recently returned from [place], drove 600 miles, reports he has second hold here in [place], but his primary care and cardiology is in [place].  Patient was found to have A-fib with RVR, denied any specific chest pain, did notice worsening lower extremity edema.  While undergoing CTA, there was concern of for seizure-like activity, per RN patient was very tired and urinated on himself and came out of it quickly.  No prior history of seizures per report.  Patient also had episodes of vomiting blood while he set up on the CT table.  Denies any history of GI bleed previously, on Eliquis for atrial fibrillation last dose he took yesterday evening per patient.  Also per report this morning patient had large melanotic stool.      Review of Systems   GENERAL: No change in appetite or weight;   No fevers, chills, sweats.    SKIN: No nonhealing lesions.   No rashes.  HEMATOLOGIC/LYMPH: No easy bruising, bleeding.   No swollen nodes.   EYES: No vision changes or diplopia.   ENT: No tinnitus, hearing loss, gum bleeding, epistaxis, hoarseness or dysphagia.   RESPIRATORY: No cough, +shortness of breath, hemoptysis or wheezing.   CVS: No chest pain, palpitations, orthopnea, dyspnea on exertion or PND.   GI: + melena , + hematemesis  No  abdominal pain.  No nausea, vomiting, constipation, diarrhea  : No lower tract obstructive symptoms, dysuria or hematuria.   MUSCULOSKELETAL: No bone pain.  No joint stiffness.   NEUROLOGICAL: No global weakness, loss of consciousness or seizures.   PSYCHIATRIC: No increased nervousness, mood changes or depression.     Personal History     Past Medical History:   Diagnosis Date    Abnormal CT scan     Acute back pain     Acute sinusitis     Allergic conjunctivitis     Allergic rhinitis     Benign essential hypertension     Bronchitis     COPD (chronic obstructive pulmonary disease)     TRINH (dyspnea on exertion)     Dyspnea     Edema     Emphysema lung     Fatigue     Hyperlipidemia     Hypertension     EDILMA (obstructive sleep apnea)     Pain in joint of left shoulder     Palpitations     Polyp, sigmoid colon     Sleep apnea     SOBOE (shortness of breath on exertion)     Subclinical hypothyroidism     Tachycardia     sudden    Trigger finger      Past Surgical History:   Procedure Laterality Date    COLONOSCOPY N/A 10/28/2016    Procedure: COLONOSCOPY INTO CECUM WITH POLYPECTOMY X 2;  Surgeon: Carli Khanna MD;  Location: [facility] ENDOSCOPY;  Service:     CYST REMOVAL       Family History   Problem Relation Age of Onset    Heart disease Mother     Diabetes Mother     Hypertension Mother     Stroke Mother     Cancer Father     Hypertension Father     Heart disease Brother      Social History     Tobacco Use    Smoking status: Former     Current packs/day: 0.00     Average packs/day: 1.5 packs/day for 35.0 years (52.5 ttl pk-yrs)     Types: Cigarettes     Start date:      Quit date:      Years since quittin.3    Smokeless tobacco: Never    Tobacco comments:     caffeine use   Vaping Use    Vaping status: Never Used   Substance Use Topics    Alcohol use: Not Currently     Comment: 5-7 beer/liquor drinks a week    Drug use: No     No current facility-administered medications on file prior to  encounter.     Current Outpatient Medications on File Prior to Encounter   Medication Sig Dispense Refill    atorvastatin (LIPITOR) 20 MG tablet TAKE 1 TABLET EVERY DAY 90 tablet 3    dilTIAZem (CARDIZEM) 30 MG tablet TAKE ONE TABLET BY MOUTH THREE TIMES A  tablet 3    Eliquis 5 MG tablet tablet TAKE 1 TABLET EVERY 12 HOURS (Patient taking differently: Take 1 tablet by mouth Every 12 (Twelve) Hours.) 60 tablet 3    furosemide (LASIX) 20 MG tablet Take 2 tablets by mouth 2 (Two) Times a Day. (Patient taking differently: Take 2 tablets by mouth Daily. Take two tablets (40mg) every morning and one tablet (20mg) every evening) 360 tablet 3    levothyroxine (SYNTHROID, LEVOTHROID) 50 MCG tablet TAKE ONE TABLET BY MOUTH DAILY 90 tablet 3    potassium chloride 10 MEQ CR tablet Take 1 tablet by mouth 1 (One) Time.      tamsulosin (FLOMAX) 0.4 MG capsule 24 hr capsule Take 1 capsule by mouth Daily.      albuterol sulfate  (90 Base) MCG/ACT inhaler INHALE TWO PUFFS BY MOUTH EVERY 6 HOURS AS NEEDED FOR WHEEZING 18 g 4    fluticasone (FLONASE) 50 MCG/ACT nasal spray 2 sprays into the nostril(s) as directed by provider Daily.      fluticasone-salmeterol (ADVAIR) 250-50 MCG/DOSE DISKUS Inhale 1 puff 2 (Two) Times a Day. 60 each 5    ipratropium-albuterol (DUO-NEB) 0.5-2.5 mg/3 ml nebulizer INHALE THE CONTENT OF ONE VIAL VIA NEBULIZER EVERY 4 HOURS AS NEEDED FOR WHEEZING 90 mL 3    losartan (Cozaar) 25 MG tablet Take 1 tablet by mouth Daily. 90 tablet 1    O2 (OXYGEN) Inhale 1 (One) Time. 2.5-3 liters      tiotropium (Spiriva HandiHaler) 18 MCG per inhalation capsule Place 1 capsule into inhaler and inhale Daily. 30 capsule 2    vitamin B-12 (CYANOCOBALAMIN) 100 MCG tablet Take  by mouth Daily.       No Known Allergies    Objective    Objective     Vital Signs  Temp:  [97.5 °F (36.4 °C)-98.3 °F (36.8 °C)] 97.5 °F (36.4 °C)  Heart Rate:  [112-139] 129  Resp:  [18-20] 18  BP: ()/(56-81) 102/60  SpO2:  [89 %-100  %] 89 %  on  Flow (L/min):  [2-5] 3;   Device (Oxygen Therapy): nasal cannula  Body mass index is 25.83 kg/m².    Physical Exam  General: Alert and oriented x3, no acute distress.  HEENT: Normocephalic, atraumatic  Eyes: PERRL, EOMI, anicteric sclerae  Lungs: Faint crackles, no wheezing, nonlabored breathing, on 3 L nasal cannula  CV: Irregular rhythm, tachycardic heart rate 120s-120s while in the room, + systolic murmur  Abdomen: Soft, nontender, nondistended.  Normoactive bowel sounds  Extremities: 2+ bilatera lower extremity peripheral edema, no cyanosis  Skin: Clean/dry/intact, no rashes  Neuro: Cranial nerves II through XII intact, no gross focal neurological deficits appreciated  Psych: Appropriate mood and affect    Results Review:  I reviewed the patient's new clinical results.  I reviewed the patient's new imaging results and agree with the interpretation.  I reviewed the patient's other test results and agree with the interpretation  I personally viewed and interpreted the patient's EKG/Telemetry data  Discussed with ED provider.    Lab Results (last 24 hours)       Procedure Component Value Units Date/Time    CBC & Differential [[id number]]  (Abnormal) Collected: 05/02/24 2101    Specimen: Blood Updated: 05/02/24 2126    Narrative:      The following orders were created for panel order CBC & Differential.  Procedure                               Abnormality         Status                     ---------                               -----------         ------                     CBC Auto Differential[[id number]]        Abnormal            Final result                 Please view results for these tests on the individual orders.    Comprehensive Metabolic Panel [[id number]]  (Abnormal) Collected: 05/02/24 2101    Specimen: Blood Updated: 05/02/24 2144     Glucose 127 mg/dL      BUN 30 mg/dL      Creatinine 0.79 mg/dL      Sodium 138 mmol/L      Potassium 4.3 mmol/L      Chloride 102 mmol/L      CO2 24.9 mmol/L       Calcium 8.4 mg/dL      Total Protein 5.9 g/dL      Albumin 3.1 g/dL      ALT (SGPT) 17 U/L      AST (SGOT) 21 U/L      Alkaline Phosphatase 70 U/L      Total Bilirubin 0.8 mg/dL      Globulin 2.8 gm/dL      A/G Ratio 1.1 g/dL      BUN/Creatinine Ratio 38.0     Anion Gap 11.1 mmol/L      eGFR 86.5 mL/min/1.73     Narrative:      GFR Normal >60  Chronic Kidney Disease <60  Kidney Failure <15    The GFR formula is only valid for adults with stable renal function between ages 18 and 70.    BNP [[id number]]  (Normal) Collected: 05/02/24 2101    Specimen: Blood Updated: 05/02/24 2144     proBNP 738.0 pg/mL     Narrative:      This assay is used as an aid in the diagnosis of individuals suspected of having heart failure. It can be used as an aid in the diagnosis of acute decompensated heart failure (ADHF) in patients presenting with signs and symptoms of ADHF to the emergency department (ED). In addition, NT-proBNP of <300 pg/mL indicates ADHF is not likely.    Age Range Result Interpretation  NT-proBNP Concentration (pg/mL:      <50             Positive            >450                   Gray                 300-450                    Negative             <300    50-75           Positive            >900                  Gray                300-900                  Negative            <300      >75             Positive            >1800                  Gray                300-1800                  Negative            <300    Single High Sensitivity Troponin T [[id number]]  (Abnormal) Collected: 05/02/24 2101    Specimen: Blood Updated: 05/02/24 2144     HS Troponin T 22 ng/L     Narrative:      High Sensitive Troponin T Reference Range:  <14.0 ng/L- Negative Female for AMI  <22.0 ng/L- Negative Male for AMI  >=14 - Abnormal Female indicating possible myocardial injury.  >=22 - Abnormal Male indicating possible myocardial injury.   Clinicians would have to utilize clinical acumen, EKG, Troponin, and serial changes to  determine if it is an Acute Myocardial Infarction or myocardial injury due to an underlying chronic condition.         CBC Auto Differential [[id number]]  (Abnormal) Collected: 05/02/24 2101    Specimen: Blood Updated: 05/02/24 2126     WBC 7.51 10*3/mm3      RBC 3.65 10*6/mm3      Hemoglobin 11.5 g/dL      Hematocrit 35.2 %      MCV 96.4 fL      MCH 31.5 pg      MCHC 32.7 g/dL      RDW 12.5 %      RDW-SD 44.4 fl      MPV 11.6 fL      Platelets 227 10*3/mm3      Neutrophil % 74.0 %      Lymphocyte % 13.2 %      Monocyte % 11.5 %      Eosinophil % 0.3 %      Basophil % 0.3 %      Immature Grans % 0.7 %      Neutrophils, Absolute 5.57 10*3/mm3      Lymphocytes, Absolute 0.99 10*3/mm3      Monocytes, Absolute 0.86 10*3/mm3      Eosinophils, Absolute 0.02 10*3/mm3      Basophils, Absolute 0.02 10*3/mm3      Immature Grans, Absolute 0.05 10*3/mm3      nRBC 0.0 /100 WBC     Protime-INR [[id number]]  (Abnormal) Collected: 05/02/24 2101    Specimen: Blood Updated: 05/02/24 2307     Protime 17.9 Seconds      INR 1.45    aPTT [[id number]]  (Abnormal) Collected: 05/02/24 2101    Specimen: Blood Updated: 05/02/24 2307     PTT 36.8 seconds     Respiratory Panel PCR w/COVID-19(SARS-CoV-2) JOSE MARTIN/KARI/TED/PAD/COR/JORGE ALBERTO In-House, NP Swab in [place], 2 HR TAT - Swab, Nasopharynx [[id number]]  (Normal) Collected: 05/02/24 2217    Specimen: Swab from Nasopharynx Updated: 05/02/24 2353     ADENOVIRUS, PCR Not Detected     Coronavirus 229E Not Detected     Coronavirus HKU1 Not Detected     Coronavirus NL63 Not Detected     Coronavirus OC43 Not Detected     COVID19 Not Detected     Human Metapneumovirus Not Detected     Human Rhinovirus/Enterovirus Not Detected     Influenza A PCR Not Detected     Influenza B PCR Not Detected     Parainfluenza Virus 1 Not Detected     Parainfluenza Virus 2 Not Detected     Parainfluenza Virus 3 Not Detected     Parainfluenza Virus 4 Not Detected     RSV, PCR Not Detected     Bordetella pertussis pcr Not Detected      Bordetella parapertussis PCR Not Detected     Chlamydophila pneumoniae PCR Not Detected     Mycoplasma pneumo by PCR Not Detected    Narrative:      In the setting of a positive respiratory panel with a viral infection PLUS a negative procalcitonin without other underlying concern for bacterial infection, consider observing off antibiotics or discontinuation of antibiotics and continue supportive care. If the respiratory panel is positive for atypical bacterial infection (Bordetella pertussis, Chlamydophila pneumoniae, or Mycoplasma pneumoniae), consider antibiotic de-escalation to target atypical bacterial infection.    Basic Metabolic Panel [[id number]]  (Abnormal) Collected: 05/03/24 0621    Specimen: Blood Updated: 05/03/24 0722     Glucose 143 mg/dL      BUN 35 mg/dL      Creatinine 0.70 mg/dL      Sodium 140 mmol/L      Potassium 4.0 mmol/L      Chloride 106 mmol/L      CO2 26.0 mmol/L      Calcium 8.1 mg/dL      BUN/Creatinine Ratio 50.0     Anion Gap 8.0 mmol/L      eGFR 89.7 mL/min/1.73     Narrative:      GFR Normal >60  Chronic Kidney Disease <60  Kidney Failure <15    The GFR formula is only valid for adults with stable renal function between ages 18 and 70.    CBC (No Diff) [[id number]]  (Abnormal) Collected: 05/03/24 0621    Specimen: Blood Updated: 05/03/24 0711     WBC 14.41 10*3/mm3      RBC 2.96 10*6/mm3      Hemoglobin 9.5 g/dL      Hematocrit 27.9 %      MCV 94.3 fL      MCH 32.1 pg      MCHC 34.1 g/dL      RDW 12.3 %      RDW-SD 42.3 fl      MPV 11.9 fL      Platelets 201 10*3/mm3     Magnesium [[id number]]  (Normal) Collected: 05/03/24 0621    Specimen: Blood Updated: 05/03/24 0741     Magnesium 2.1 mg/dL     Phosphorus [[id number]]  (Normal) Collected: 05/03/24 0621    Specimen: Blood Updated: 05/03/24 0741     Phosphorus 3.4 mg/dL     Procalcitonin [[id number]]  (Normal) Collected: 05/03/24 0621    Specimen: Blood Updated: 05/03/24 0750     Procalcitonin <0.02 ng/mL     Narrative:      As a  "Marker for Sepsis (Non-Neonates):    1. <0.5 ng/mL represents a low risk of severe sepsis and/or septic shock.  2. >2 ng/mL represents a high risk of severe sepsis and/or septic shock.    As a Marker for Lower Respiratory Tract Infections that require antibiotic therapy:    PCT on Admission    Antibiotic Therapy       6-12 Hrs later    >0.5                Strongly Recommended  >0.25 - <0.5        Recommended   0.1 - 0.25          Discouraged              Remeasure/reassess PCT  <0.1                Strongly Discouraged     Remeasure/reassess PCT    As 28 day mortality risk marker: \"Change in Procalcitonin Result\" (>80% or <=80%) if Day 0 (or Day 1) and Day 4 values are available. Refer to http://[email or web address]-[email or web address]    Change in PCT <=80%  A decrease of PCT levels below or equal to 80% defines a positive change in PCT test result representing a higher risk for 28-day all-cause mortality of patients diagnosed with severe sepsis for septic shock.    Change in PCT >80%  A decrease of PCT levels of more than 80% defines a negative change in PCT result representing a lower risk for 28-day all-cause mortality of patients diagnosed with severe sepsis or septic shock.       Iron Profile [[id number]]  (Abnormal) Collected: 05/03/24 0621    Specimen: Blood Updated: 05/03/24 0942     Iron 116 mcg/dL      Iron Saturation (TSAT) 48 %      Transferrin 161 mg/dL      TIBC 240 mcg/dL     Ferritin [[id number]]  (Normal) Collected: 05/03/24 0621    Specimen: Blood Updated: 05/03/24 0945     Ferritin 135.00 ng/mL     Narrative:      Results may be falsely decreased if patient taking Biotin.      Hemoglobin & Hematocrit, Blood [[id number]]  (Abnormal) Collected: 05/03/24 1148    Specimen: Blood Updated: 05/03/24 1249     Hemoglobin 9.2 g/dL      Hematocrit 28.8 %             Imaging Results (Last 24 Hours)       Procedure Component Value Units Date/Time    CT Angiogram Chest Pulmonary Embolism [[id number]] Collected: 05/03/24 0048 "     Updated: 05/03/24 0101    Narrative:      CT ANGIOGRAM CHEST     HISTORY: Lower extremity edema and dyspnea     COMPARISON: February 7, 2020     TECHNIQUE: Axial CT imaging was obtained through the thorax. IV contrast  was administered. Three-D reformatted images were obtained.     FINDINGS:  No acute pulmonary thromboembolus is seen. Examination was not optimized  for assessment of the thoracic aorta. There is some dilatation of the  proximal descending aorta, measuring up to 3.0 cm. Distal aorta measures  2.5 cm. Obvious dissection is not seen. Thyroid gland, trachea, and  esophagus appear normal. There are coronary artery calcifications, as  well as dystrophic calcifications of the mitral valve annulus. There are  background emphysematous changes. There is biapical scarring. Patient is  again noted to have some fibrotic changes at the lung bases, including  interlobular septal thickening and peripheral reticulation. There were  similar findings in February 2020. There may be slightly more  consolidation at the left lung base when compared to prior exam. It is  unclear if this reflects some progression of fibrotic change, or a  superimposed infectious or inflammatory process. Bronchial wall  thickening is more apparent on the current study, suggesting bronchitis.  Images through the upper abdomen suggest some nodularity to the contour  of the liver, as well as relative enlargement of the caudate lobe  relative to the right hepatic lobe, suggesting cirrhosis. The pancreas  is atrophic. There are aortoiliac calcifications. No acute osseous  abnormalities are seen.       Impression:      The patient has background fibrotic changes. There may be some  increasing consolidation within the left lower lobe when compared to the  prior exam. Some of this may reflect some progression of fibrotic  change, although a superimposed infiltrate is not excluded. There is  also some increasing bronchial wall thickening,  particularly within the  lower lobes. Bronchitis is not excluded.     Radiation dose reduction techniques were utilized, including automated  exposure control and exposure modulation based on body size.        This report was finalized on 5/3/2024 12:58 AM by Dr. Kesha Ga M.D on Workstation: [id number]       XR Chest 1 View [[id number]] Collected: 05/02/24 2149     Updated: 05/02/24 2153    Narrative:      SINGLE VIEW OF THE CHEST     HISTORY: Shortness of air     COMPARISON: Are 7/2021     FINDINGS:  Heart size is within normal limits. There is calcification of the aorta.  There are background emphysematous changes. The patient appears to have  chronic scarring at the lung bases. This is more pronounced on the left.  No large effusion is seen. No pneumothorax is identified.       Impression:      No acute findings.     This report was finalized on 5/2/2024 9:50 PM by Dr. Kesha Ga M.D on Workstation: [id number]               Results for orders placed in visit on 12/31/20    Adult Transthoracic Echo Complete w/ Color, Spectral and Contrast if Necessary Per Protocol    Interpretation Summary  · Left ventricular wall thickness is consistent with concentric hypertrophy.  · Estimated left ventricular EF = 65% Left ventricular systolic function is normal.  · Left ventricular diastolic function was normal.  · Moderate mitral annular calcification is present.  · Mild tricuspid valve regurgitation is present. Estimated right ventricular systolic pressure from tricuspid regurgitation is mildly elevated (35-45 mmHg). Calculated right ventricular systolic pressure from tricuspid regurgitation is 40 mmHg.      ECG 12 Lead ED Triage Standing Order; SOA   Final Result   HEART RATE= 129  bpm   RR Interval= 465  ms   MA Interval=   ms   P Horizontal Axis=   deg   P Front Axis=   deg   QRSD Interval= 94  ms   QT Interval= 324  ms   QTcB= 475  ms   QRS Axis= 81  deg   T Wave Axis= 32  deg   - ABNORMAL ECG -    Atrial fibrillation   Low voltage, extremity leads   When compared with ECG of 07-Aug-2021 21:26:05,   No significant change    Electronically Signed By: Paul Damon ([facility]) 03-May-2024 12:07:18   Date and Time of Study: 2024-05-02 20:35:58      Telemetry Scan   Final Result      Telemetry Scan   Final Result      Telemetry Scan   Final Result      Telemetry Scan   Final Result           Assessment/Plan     Active Hospital Problems    Diagnosis  POA    **Atrial fibrillation with rapid ventricular response [I48.91]  Yes    Hematemesis [K92.0]  Yes    Melena [K92.1]  Unknown    PAF (paroxysmal atrial fibrillation) [I48.0]  Yes    Benign essential hypertension [I10]  Yes      Resolved Hospital Problems   No resolved problems to display.     Patient is a 88-year-old male with a history of including but not limited to paroxysmal atrial flutter/A-fib on Eliquis, hypertension, COPD, presented to the ED complaining of worsening shortness of breath and lower extremity edema.  Patient had 2 episodes of hematemesis and seizure-like activity while undergoing CTA    Hematemesis/GI bleed  -Patient with 2 episodes of hematemesis, +melena   -Hemoglobin was 11.5 on admission, dropped to 9.5 this morning  -Eliquis held, initiated on IV PPI drip, H&H every 6 hours  -Typed and crossed 2 units of PRBC for possible transfusion  -GI consulted, plan for EGD once stable from cardiac standpoint    A-fib RVR  -Resume diltiazem 30 mg every 8 hours, hold for SBP below 100  -IV Lasix  -Cardiology consult, IV digoxin given  -Add on TSH      Pulmonary fibrosis/chronic hypoxic respiratory failure on 3 L nasal at baseline/COPD  -CT scan showed background fibrotic changes. There may be some increasing consolidation within the left lower lobe when compared to the prior exam.   -WBC trending up  -Concern for aspiration secondary to vomiting  -Initiated on IV ceftriaxone  Tiotropium,.  DuoNebs  -Pulmonology consult    Essential hypertension  -BP  soft secondary to A-fib RVR  -On diltiazem with holding parameters, monitor    Seizure-like activity  -Patient had seizure like activity with urinary incontinence while undergoing CTA  -Consult neurology    VTE Prophylaxis - SCD  Code Status - Full code.       Anh Costa MD  [facility]  05/03/24  14:22 EDT

## 2024-05-03 NOTE — CONSULTS
Gastroenterology   Initial Inpatient Consult Note  Thank you for asking opinion on this patient.  Referring Provider: RENETTA Kamara MD    Reason for Consultation: GI bleed    Subjective     History of present illness:    88 y.o. male that we are asked to see for GI bleed.  He apparently recently came back to [place] from his home in [place] and had been feeling fine until yesterday when he began experiencing acute shortness of breath with exertion.  After evaluation in the ER, he went for a CT angiogram and apparently during this imaging study he had an episode of coffee ground emesis with another episode apparently in the ER.  This morning, he had a large melanotic stool which I visualized.  He denies any abdominal pain.  No prior history of any GI bleeding.  He is on Eliquis for atrial fibrillation.  This morning, he is in atrial fibrillation with heart rate in the 120s to 130s.    Past Medical History:  Past Medical History:   Diagnosis Date    Abnormal CT scan     Acute back pain     Acute sinusitis     Allergic conjunctivitis     Allergic rhinitis     Benign essential hypertension     Bronchitis     COPD (chronic obstructive pulmonary disease)     TRINH (dyspnea on exertion)     Dyspnea     Edema     Emphysema lung     Fatigue     Hyperlipidemia     Hypertension     EDILMA (obstructive sleep apnea)     Pain in joint of left shoulder     Palpitations     Polyp, sigmoid colon     Sleep apnea     SOBOE (shortness of breath on exertion)     Subclinical hypothyroidism     Tachycardia     sudden    Trigger finger      Past Surgical History:  Past Surgical History:   Procedure Laterality Date    COLONOSCOPY N/A 10/28/2016    Procedure: COLONOSCOPY INTO CECUM WITH POLYPECTOMY X 2;  Surgeon: Carli Khanna MD;  Location: [facility] ENDOSCOPY;  Service:     CYST REMOVAL  1960      Social History:   Social History     Tobacco Use    Smoking status: Former     Current packs/day: 0.00     Average packs/day: 1.5 packs/day for  35.0 years (52.5 ttl pk-yrs)     Types: Cigarettes     Start date:      Quit date:      Years since quittin.3    Smokeless tobacco: Never    Tobacco comments:     caffeine use   Substance Use Topics    Alcohol use: Not Currently     Comment: 5-7 beer/liquor drinks a week      Family History:  Family History   Problem Relation Age of Onset    Heart disease Mother     Diabetes Mother     Hypertension Mother     Stroke Mother     Cancer Father     Hypertension Father     Heart disease Brother        Home Meds:  Facility-Administered Medications Prior to Admission   Medication Dose Route Frequency Provider Last Rate Last Admin    levalbuterol (XOPENEX) nebulizer solution 0.63 mg  0.63 mg Nebulization Q6H PRN Padmini Jha MD   0.63 mg at 19 1503     Medications Prior to Admission   Medication Sig Dispense Refill Last Dose    atorvastatin (LIPITOR) 20 MG tablet TAKE 1 TABLET EVERY DAY 90 tablet 3 2024    dilTIAZem (CARDIZEM) 30 MG tablet TAKE ONE TABLET BY MOUTH THREE TIMES A  tablet 3 2024    Eliquis 5 MG tablet tablet TAKE 1 TABLET EVERY 12 HOURS (Patient taking differently: Take 1 tablet by mouth Every 12 (Twelve) Hours.) 60 tablet 3 2024    furosemide (LASIX) 20 MG tablet Take 2 tablets by mouth 2 (Two) Times a Day. (Patient taking differently: Take 2 tablets by mouth Daily. Take two tablets (40mg) every morning and one tablet (20mg) every evening) 360 tablet 3 2024    levothyroxine (SYNTHROID, LEVOTHROID) 50 MCG tablet TAKE ONE TABLET BY MOUTH DAILY 90 tablet 3 2024    potassium chloride 10 MEQ CR tablet Take 1 tablet by mouth 1 (One) Time.   2024    tamsulosin (FLOMAX) 0.4 MG capsule 24 hr capsule Take 1 capsule by mouth Daily.   2024    albuterol sulfate  (90 Base) MCG/ACT inhaler INHALE TWO PUFFS BY MOUTH EVERY 6 HOURS AS NEEDED FOR WHEEZING 18 g 4     fluticasone (FLONASE) 50 MCG/ACT nasal spray 2 sprays into the nostril(s) as directed by provider  Daily.       fluticasone-salmeterol (ADVAIR) 250-50 MCG/DOSE DISKUS Inhale 1 puff 2 (Two) Times a Day. 60 each 5     ipratropium-albuterol (DUO-NEB) 0.5-2.5 mg/3 ml nebulizer INHALE THE CONTENT OF ONE VIAL VIA NEBULIZER EVERY 4 HOURS AS NEEDED FOR WHEEZING 90 mL 3     losartan (Cozaar) 25 MG tablet Take 1 tablet by mouth Daily. 90 tablet 1     O2 (OXYGEN) Inhale 1 (One) Time. 2.5-3 liters       tiotropium (Spiriva HandiHaler) 18 MCG per inhalation capsule Place 1 capsule into inhaler and inhale Daily. 30 capsule 2     vitamin B-12 (CYANOCOBALAMIN) 100 MCG tablet Take  by mouth Daily.        Current Meds:   cefTRIAXone, 2,000 mg, Intravenous, Q24H  furosemide, 40 mg, Intravenous, BID  pantoprazole, 80 mg, Intravenous, Once  sodium chloride, 10 mL, Intravenous, Q12H      Allergies:  No Known Allergies  Review of Systems  Pertinent items are noted in HPI, all other systems reviewed and negative    Objective     Vital Signs  Temp:  [97.5 °F (36.4 °C)-98.3 °F (36.8 °C)] 97.5 °F (36.4 °C)  Heart Rate:  [112-139] 129  Resp:  [18-20] 18  BP: ()/(56-81) 102/60    Physical Exam:  CONSTITUTIONAL:  today's vital signs reviewed  EARS NOSE THROAT: trachea midline and no deformity of the nares  EYES: no scleral icterus  GASTROINTESTINAL: abdomen is soft, nontender, nondistended with normal active bowel sounds, no masses are appreciated  PSYCHIATRIC: appropriate mood and affect  RESPIRATORY: normal inspiratory effort with no increased work of breathing  NEUROLOGIC: patient is awake and alert  DERMATOLOGIC: skin is warm with no cyanosis  LYMPHATIC: no periumbilical lymphadenopathy     Results Review:   I reviewed the patient's new clinical results.    Results from last 7 days   Lab Units 05/03/24  0621 05/02/24  2101   WBC 10*3/mm3 14.41* 7.51   HEMOGLOBIN g/dL 9.5* 11.5*   HEMATOCRIT % 27.9* 35.2*   PLATELETS 10*3/mm3 201 227     Results from last 7 days   Lab Units 05/03/24  0621 05/02/24  2101   SODIUM mmol/L 140 138  "  POTASSIUM mmol/L 4.0 4.3   CHLORIDE mmol/L 106 102   CO2 mmol/L 26.0 24.9   BUN mg/dL 35* 30*   CREATININE mg/dL 0.70* 0.79   CALCIUM mg/dL 8.1* 8.4*   BILIRUBIN mg/dL  --  0.8   ALK PHOS U/L  --  70   ALT (SGPT) U/L  --  17   AST (SGOT) U/L  --  21   GLUCOSE mg/dL 143* 127*     Results from last 7 days   Lab Units 05/02/24  2101   INR  1.45*     No results found for: \"LIPASE\"    Radiology:  CT Angiogram Chest Pulmonary Embolism   Final Result   The patient has background fibrotic changes. There may be some   increasing consolidation within the left lower lobe when compared to the   prior exam. Some of this may reflect some progression of fibrotic   change, although a superimposed infiltrate is not excluded. There is   also some increasing bronchial wall thickening, particularly within the   lower lobes. Bronchitis is not excluded.       Radiation dose reduction techniques were utilized, including automated   exposure control and exposure modulation based on body size.           This report was finalized on 5/3/2024 12:58 AM by Dr. Kesha Ga M.D on Workstation: [employer]          XR Chest 1 View   Final Result   No acute findings.       This report was finalized on 5/2/2024 9:50 PM by Dr. Kesha Ga M.D on Workstation: [employer]              Assessment & Plan   Active Hospital Problems    Diagnosis     **Atrial fibrillation with rapid ventricular response     Hematemesis     PAF (paroxysmal atrial fibrillation)     Benign essential hypertension        Assessment:  GI bleed.  Patient has new onset of melanotic stools as well as at least a couple of episodes of hematemesis.  No prior history of ulcer disease.  CT angiogram does suggest possibility of cirrhosis but no history of varices.  Atrial fibrillation with RVR on Eliquis current heart rate in the 120s to 130s  History of hypertension  Questionable cirrhotic changes on CT imaging of the chest but the platelet count is normal and liver " enzymes and bilirubin are normal.  Anticoagulation on Eliquis with mildly prolonged prothrombin time    Plan:  Agree with Protonix drip  Would type and cross and hold at least 2 units of packed red blood cells  Cardiology consult to help with the atrial fibrillation with RVR  Hold Eliquis  Keep n.p.o.  Patient will need upper endoscopy with the timing dependent on his clinical course as it relates to his A-fib with RVR.  Due to bleeding and his comorbidities, would not hesitate to transfer patient to ICU for further care until he stabilizes.  Serial H&H      I discussed the patients findings and my recommendations with patient and nursing staff.    MD Brandon Rice M.D.  [facility], [place], operated by [facility] [place] [address]  Office: [phone]

## 2024-05-03 NOTE — ED NOTES
"Nursing report ED to floor  Cuauhtemoc Buck  88 y.o.  male    HPI :  HPI (Adult)  Stated Reason for Visit: soa with exertion since this AM. normally wears 2L O2 is currently on 5L. hx afib  History Obtained From: patient  Precipitating Event(s): unknown    Chief Complaint  Chief Complaint   Patient presents with    Shortness of Breath       Admitting doctor:   Rivas Staton MD    Admitting diagnosis:   The primary encounter diagnosis was Acute on chronic respiratory failure with hypoxia. Diagnoses of Atrial fibrillation with rapid ventricular response, Acute congestive heart failure, unspecified heart failure type, and Chronic anticoagulation were also pertinent to this visit.    Code status:   Current Code Status       Date Active Code Status Order ID Comments User Context       Not on file            Allergies:   Patient has no known allergies.    Isolation:   No active isolations    Intake and Output  No intake or output data in the 24 hours ending 05/02/24 2224    Weight:       05/02/24 2055   Weight: 81.6 kg (180 lb)       Most recent vitals:   Vitals:    05/02/24 2023 05/02/24 2039 05/02/24 2055 05/02/24 2210   BP:  131/76  106/75   Pulse: (!) 126 118  112   Resp: 20      Temp: 98.3 °F (36.8 °C)      SpO2: 94% 100%     Weight:   81.6 kg (180 lb)    Height:   177.8 cm (70\")        Active LDAs/IV Access:   Lines, Drains & Airways       Active LDAs       Name Placement date Placement time Site Days    Peripheral IV 05/02/24 2210 Right Antecubital 05/02/24 2210  Antecubital  less than 1                    Labs (abnormal labs have a star):   Labs Reviewed   COMPREHENSIVE METABOLIC PANEL - Abnormal; Notable for the following components:       Result Value    Glucose 127 (*)     BUN 30 (*)     Calcium 8.4 (*)     Total Protein 5.9 (*)     Albumin 3.1 (*)     BUN/Creatinine Ratio 38.0 (*)     All other components within normal limits    Narrative:     GFR Normal >60  Chronic Kidney Disease <60  Kidney Failure " <15    The GFR formula is only valid for adults with stable renal function between ages 18 and 70.   SINGLE HS TROPONIN T - Abnormal; Notable for the following components:    HS Troponin T 22 (*)     All other components within normal limits    Narrative:     High Sensitive Troponin T Reference Range:  <14.0 ng/L- Negative Female for AMI  <22.0 ng/L- Negative Male for AMI  >=14 - Abnormal Female indicating possible myocardial injury.  >=22 - Abnormal Male indicating possible myocardial injury.   Clinicians would have to utilize clinical acumen, EKG, Troponin, and serial changes to determine if it is an Acute Myocardial Infarction or myocardial injury due to an underlying chronic condition.        CBC WITH AUTO DIFFERENTIAL - Abnormal; Notable for the following components:    RBC 3.65 (*)     Hemoglobin 11.5 (*)     Hematocrit 35.2 (*)     Lymphocyte % 13.2 (*)     Immature Grans % 0.7 (*)     All other components within normal limits   BNP (IN-HOUSE) - Normal    Narrative:     This assay is used as an aid in the diagnosis of individuals suspected of having heart failure. It can be used as an aid in the diagnosis of acute decompensated heart failure (ADHF) in patients presenting with signs and symptoms of ADHF to the emergency department (ED). In addition, NT-proBNP of <300 pg/mL indicates ADHF is not likely.    Age Range Result Interpretation  NT-proBNP Concentration (pg/mL:      <50             Positive            >450                   Gray                 300-450                    Negative             <300    50-75           Positive            >900                  Gray                300-900                  Negative            <300      >75             Positive            >1800                  Gray                300-1800                  Negative            <300   RESPIRATORY PANEL PCR W/ COVID-19 (SARS-COV-2), NP SWAB IN UTM/VTP, 2 HR TAT   RAINBOW DRAW    Narrative:     The following orders were created  for panel order [place] Draw.  Procedure                               Abnormality         Status                     ---------                               -----------         ------                     Green Top (Gel)[[id number]]                                  Final result               Lavender Top[[id number]]                                     Final result               Gold Top - SST[[id number]]                                   Final result               Light Blue Top[[id number]]                                   Final result                 Please view results for these tests on the individual orders.   CBC AND DIFFERENTIAL    Narrative:     The following orders were created for panel order CBC & Differential.  Procedure                               Abnormality         Status                     ---------                               -----------         ------                     CBC Auto Differential[[id number]]        Abnormal            Final result                 Please view results for these tests on the individual orders.   GREEN TOP   LAVENDER TOP   GOLD TOP - SST   LIGHT BLUE TOP       EKG:   ECG 12 Lead ED Triage Standing Order; SOA   Preliminary Result   HEART RATE= 129  bpm   RR Interval= 465  ms   PA Interval=   ms   P Horizontal Axis=   deg   P Front Axis=   deg   QRSD Interval= 94  ms   QT Interval= 324  ms   QTcB= 475  ms   QRS Axis= 81  deg   T Wave Axis= 32  deg   - ABNORMAL ECG -   Atrial fibrillation   Low voltage, extremity leads   Electronically Signed By:    Date and Time of Study: 2024-05-02 20:35:58          Meds given in ED:   Medications   sodium chloride 0.9 % flush 10 mL (has no administration in time range)   dilTIAZem (CARDIZEM) injection 10 mg (10 mg Intravenous Given 5/2/24 2210)   furosemide (LASIX) injection 80 mg (80 mg Intravenous Given 5/2/24 2210)       Imaging results:  XR Chest 1 View    Result Date: 5/2/2024  No acute findings.  This report was finalized on 5/2/2024  9:50 PM by Dr. Kesha Ga M.D on Workstation: [id number]       Ambulatory status:   - with assist    Social issues:   Social History     Socioeconomic History    Marital status:    Tobacco Use    Smoking status: Former     Current packs/day: 0.00     Average packs/day: 1.5 packs/day for 35.0 years (52.5 ttl pk-yrs)     Types: Cigarettes     Start date:      Quit date:      Years since quittin.3    Smokeless tobacco: Never    Tobacco comments:     caffeine use   Substance and Sexual Activity    Alcohol use: Not Currently     Comment: 5-7 beer/liquor drinks a week    Drug use: No    Sexual activity: Defer       Peripheral Neurovascular       Neuro Cognitive       Learning       Respiratory       Abdominal Pain       Pain Assessments  Pain (Adult)  (0-10) Pain Rating: Rest: 0  (0-10) Pain Rating: Activity: 0    NIH Stroke Scale       Yanira Swift RN  24 22:24 EDT

## 2024-05-03 NOTE — DISCHARGE PLACEMENT REQUEST
"Moise Buck (88 y.o. Male)       Date of Birth   1935    Social Security Number       Address   [address] [place] [address]    Home Phone   [phone]    MRN   [id number]       Jew   Congregational    Marital Status                               Admission Date   5/2/24    Admission Type   Emergency    Admitting Provider   Anh Costa MD    Attending Provider   Anh Costa MD    Department, Room/Bed   [address], S613/1       Discharge Date       Discharge Disposition       Discharge Destination                                 Attending Provider: Anh Costa MD    Allergies: No Known Allergies    Isolation: None   Infection: None   Code Status: CPR    Ht: 177.8 cm (70\")   Wt: 81.6 kg (180 lb)    Admission Cmt: None   Principal Problem: Atrial fibrillation with rapid ventricular response [I48.91]                   Active Insurance as of 5/2/2024       Primary Coverage       Payor Plan Insurance Group Employer/Plan Group    MEDICARE MEDICARE A & B        Payor Plan Address Payor Plan Phone Number Payor Plan Fax Number Effective Dates    [address] [phone]  5/1/2002 - None Entered    [place] [address]         Subscriber Name Subscriber Birth Date Member ID       MARLEYMOISE BOWMAN 1935 [id number]                     Emergency Contacts        (Rel.) Home Phone Work Phone Mobile Phone    Alex Rowley (Daughter) [phone] -- [phone]    Sanjay Buck (Son) [phone] -- [phone]                "

## 2024-05-03 NOTE — PLAN OF CARE
Goal Outcome Evaluation:  Plan of Care Reviewed With: patient        Progress: no change  Outcome Evaluation: Pt had mulitple (x6) black/ dark red loose stools; on IV fluids and IV abx; 2 Units of PRBCs ordered- first completed; GI, pulmonary and Cardiology consulted- pt given dig, cardiac meds; HR trended down from 140s to 110s in afib; pt on 2-3 L today; Lasix held until after blood to be given; MRI ordered ; H/H [id number] trending down.

## 2024-05-03 NOTE — CONSULTS
Date of Hospital Visit:24  Encounter Provider: ADAN Quick  Place of Service: [facility] CARDIOLOGY  Patient Name: Cuauhtemoc Buck  :1935  Referral Provider: Rivas Staton MD    Chief complaint: Shortness of breath    History of Present Illness  Cuauhtemoc Buck is an 88-year-old gentleman previously followed by Dr. Peggy Trejo.  He has not been seen in our office since 2021.  He has past medical history significant of  hypertension, hyperlipidemia, paroxysmal atrial flutter/fibrillation, anemia, emphysema/COPD and emphysema.     In  he was evaluated for dyspnea.  He had echo which showed preserved LV systolic function and no significant valve disease.  He had a nonischemic perfusion stress test and 24-hour Holter monitor which was normal.  In  he was diagnosed with atrial flutter and spontaneously converted back to normal sinus rhythm.  He was treated with diltiazem and was not anticoagulated.     Follow-up echo 2021 showed normal left ventricular systolic function, moderate mitral annular calcification, mildly elevated RVSP at 40 mmHg.    He had recurrent atrial fibrillation in 2021 and responded well to IV diltiazem.    He presented to the ER yesterday with complaint of shortness of breath.  He was found to have A-fib with RVR and was given IV diltiazem 10 mg and furosemide IV 80 mg.  He also had some coffee-ground emesis and melena.  He was started on PPI drip per GI.  Pulmonary is also following as it was felt that he might have aspirated with emesis while in CT.    Cardiology has been asked to see for rapid A-fib.  Renal function is stable.  Hemoglobin has dropped from 11.5-9.5 overnight.      Past Medical History:   Diagnosis Date    Abnormal CT scan     Acute back pain     Acute sinusitis     Allergic conjunctivitis     Allergic rhinitis     Benign essential hypertension     Bronchitis     COPD (chronic obstructive pulmonary disease)      TRINH (dyspnea on exertion)     Dyspnea     Edema     Emphysema lung     Fatigue     Hyperlipidemia     Hypertension     EDILMA (obstructive sleep apnea)     Pain in joint of left shoulder     Palpitations     Polyp, sigmoid colon     Sleep apnea     SOBOE (shortness of breath on exertion)     Subclinical hypothyroidism     Tachycardia     sudden    Trigger finger        Past Surgical History:   Procedure Laterality Date    COLONOSCOPY N/A 10/28/2016    Procedure: COLONOSCOPY INTO CECUM WITH POLYPECTOMY X 2;  Surgeon: Carli Khanna MD;  Location: [facility] ENDOSCOPY;  Service:     CYST REMOVAL  1960       No current facility-administered medications on file prior to encounter.     Current Outpatient Medications on File Prior to Encounter   Medication Sig Dispense Refill    atorvastatin (LIPITOR) 20 MG tablet TAKE 1 TABLET EVERY DAY 90 tablet 3    dilTIAZem (CARDIZEM) 30 MG tablet TAKE ONE TABLET BY MOUTH THREE TIMES A  tablet 3    Eliquis 5 MG tablet tablet TAKE 1 TABLET EVERY 12 HOURS (Patient taking differently: Take 1 tablet by mouth Every 12 (Twelve) Hours.) 60 tablet 3    furosemide (LASIX) 20 MG tablet Take 2 tablets by mouth 2 (Two) Times a Day. (Patient taking differently: Take 2 tablets by mouth Daily. Take two tablets (40mg) every morning and one tablet (20mg) every evening) 360 tablet 3    levothyroxine (SYNTHROID, LEVOTHROID) 50 MCG tablet TAKE ONE TABLET BY MOUTH DAILY 90 tablet 3    potassium chloride 10 MEQ CR tablet Take 1 tablet by mouth 1 (One) Time.      tamsulosin (FLOMAX) 0.4 MG capsule 24 hr capsule Take 1 capsule by mouth Daily.      albuterol sulfate  (90 Base) MCG/ACT inhaler INHALE TWO PUFFS BY MOUTH EVERY 6 HOURS AS NEEDED FOR WHEEZING 18 g 4    fluticasone (FLONASE) 50 MCG/ACT nasal spray 2 sprays into the nostril(s) as directed by provider Daily.      fluticasone-salmeterol (ADVAIR) 250-50 MCG/DOSE DISKUS Inhale 1 puff 2 (Two) Times a Day. 60 each 5     "ipratropium-albuterol (DUO-NEB) 0.5-2.5 mg/3 ml nebulizer INHALE THE CONTENT OF ONE VIAL VIA NEBULIZER EVERY 4 HOURS AS NEEDED FOR WHEEZING 90 mL 3    losartan (Cozaar) 25 MG tablet Take 1 tablet by mouth Daily. 90 tablet 1    O2 (OXYGEN) Inhale 1 (One) Time. 2.5-3 liters      tiotropium (Spiriva HandiHaler) 18 MCG per inhalation capsule Place 1 capsule into inhaler and inhale Daily. 30 capsule 2    vitamin B-12 (CYANOCOBALAMIN) 100 MCG tablet Take  by mouth Daily.         Social History     Socioeconomic History    Marital status:    Tobacco Use    Smoking status: Former     Current packs/day: 0.00     Average packs/day: 1.5 packs/day for 35.0 years (52.5 ttl pk-yrs)     Types: Cigarettes     Start date:      Quit date:      Years since quittin.3    Smokeless tobacco: Never    Tobacco comments:     caffeine use   Vaping Use    Vaping status: Never Used   Substance and Sexual Activity    Alcohol use: Not Currently     Comment: 5-7 beer/liquor drinks a week    Drug use: No    Sexual activity: Defer       Family History   Problem Relation Age of Onset    Heart disease Mother     Diabetes Mother     Hypertension Mother     Stroke Mother     Cancer Father     Hypertension Father     Heart disease Brother         REVIEW OF SYSTEMS:   All ROS was performed and is Negative except as outlined in HPI     Objective:     Vitals:    24 0141 24 0157 24 0439 24 0727   BP: (!) 89/73 90/66 99/56 102/60   BP Location: Right arm   Right arm   Patient Position: Lying   Lying   Pulse: (!) 139   (!) 129   Resp: 18   18   Temp: 97.5 °F (36.4 °C)   97.5 °F (36.4 °C)   TempSrc: Oral   Oral   SpO2: (!) 89%   (!) 89%   Weight:       Height:         Body mass index is 25.83 kg/m².  Flowsheet Rows      Flowsheet Row First Filed Value   Admission Height 177.8 cm (70\") Documented at 2024   Admission Weight 81.6 kg (180 lb) Documented at 2024            Physical Exam   Pulmonary: "      Effort: Pulmonary effort is normal.      Breath sounds: Examination of the right-lower field reveals decreased breath sounds and rales. Examination of the left-lower field reveals decreased breath sounds and rales. Decreased breath sounds present. Rhonchi present.   Cardiovascular:      Tachycardia present. Irregularly irregular rhythm.      Murmurs: There is a grade 2/6 systolic murmur.         Lab Review:                Results from last 7 days   Lab Units 05/03/24  0621   SODIUM mmol/L 140   POTASSIUM mmol/L 4.0   CHLORIDE mmol/L 106   CO2 mmol/L 26.0   BUN mg/dL 35*   CREATININE mg/dL 0.70*   GLUCOSE mg/dL 143*   CALCIUM mg/dL 8.1*     Results from last 7 days   Lab Units 05/02/24  2101   HSTROP T ng/L 22*     Results from last 7 days   Lab Units 05/03/24  0621   WBC 10*3/mm3 14.41*   HEMOGLOBIN g/dL 9.5*   HEMATOCRIT % 27.9*   PLATELETS 10*3/mm3 201     Results from last 7 days   Lab Units 05/02/24  2101   INR  1.45*   APTT seconds 36.8*         Results from last 7 days   Lab Units 05/03/24  0621   MAGNESIUM mg/dL 2.1         Lab results reviewed.    I personally viewed and interpreted the patient's EKG/Telemetry data-A-fib with RVR     Assessment:      1.  Acute on chronic respiratory failure-pulmonary following  2.   Atrial fibrillation with RVR.  Received IV diltiazem in the ER  4.  COPD/emphysema-pulmonary following  5.  Pulmonary hypertension-check echo  6.  Anemia with coffee-ground emesis and melena-GI following.  7.  Pulmonary fibrosis  8.  Leukocytosis-questionable aspiration with vomiting  9.  Questionable cirrhosis on CT however liver enzymes and bilirubin are normal.  Platelet count normal  10.  Hypothyroidism-continue replacement            Plan:     - I will give IV digoxin 500 mcg now and see how his heart rate  -We will see if his blood pressure will allow short acting diltiazem 30 mg 3 times daily.  Hold for systolic blood pressure less than 100  -Eliquis to be held as we follow hemoglobin  closer.  He had a 2 g drop overnight  - repeat echo   -Hemoglobin currently stable  -GI recommends upper endoscopy and I would recommend proceeding with endoscopy once heart rate is a little better.  He is on Protonix drip  -I agree with IV Lasix 40 mg twice daily  x 2 more doses and reassess breathing  - follow renal function and electrolytes closely    Thank you for consult  We will follow      ADAN Quick  [facility]   [address] [place] [address] [address]  [place] [address]  Ph: [phone]  Fax: [phone]

## 2024-05-03 NOTE — PROGRESS NOTES
Discharge Planning Assessment  [facility]     Patient Name: Cuauhtemoc Buck  MRN: [id number]  Today's Date: 5/3/2024    Admit Date: 5/2/2024    Plan: Return home - lives alone, [place]   Discharge Needs Assessment       Row Name 05/03/24 1607       Living Environment    People in Home alone    Current Living Arrangements home    Potentially Unsafe Housing Conditions none    Primary Care Provided by self    Provides Primary Care For no one    Family Caregiver if Needed child(david), adult    Family Caregiver Names Daughter Hermila Rowley [phone] or son Sanjay [phone]    Quality of Family Relationships helpful    Able to Return to Prior Arrangements yes       Resource/Environmental Concerns    Resource/Environmental Concerns none    Transportation Concerns none       Transition Planning    Patient/Family Anticipates Transition to home    Patient/Family Anticipated Services at Transition none    Transportation Anticipated family or friend will provide       Discharge Needs Assessment    Readmission Within the Last 30 Days no previous admission in last 30 days    Equipment Currently Used at Home cpap;oxygen    Concerns to be Addressed denies needs/concerns at this time    Anticipated Changes Related to Illness none                   Discharge Plan       Row Name 05/03/24 1602       Plan    Plan Return home - lives alone, [place]    Patient/Family in Agreement with Plan yes    Plan Comments Spoke with patient at bedside.  He lives alone, is here visiting from [place], he drove himself to [place].  He uses O2 and a C-pap, he has used HH in the past and has never been to SNF.  He is going to OP PT in [place].  PCP is Dr. Evangelista [phone] and he will use [facility] [place].  Patient states he will stay with his sister for a few days before returning to [facility].  he plans to drive but if needed, family will drive for him.  CCP will follow as needed.  Beth VALENCIA                  Continued Care  and Services - Admitted Since 5/2/2024       Dialysis/Infusion       Service Provider Request Status Selected Services Address Phone Fax Patient Preferred    [facility] Declined N/A [address] [address] [phone] --                  Expected Discharge Date and Time       Expected Discharge Date Expected Discharge Time    May 7, 2024            Demographic Summary       Row Name 05/03/24 1557       General Information    Admission Type inpatient    Arrived From home    Referral Source admission list    Reason for Consult discharge planning    Preferred Language English                   Functional Status       Row Name 05/03/24 1607       Functional Status    Usual Activity Tolerance moderate    Current Activity Tolerance moderate       Functional Status, IADL    Medications independent    Meal Preparation independent    Housekeeping independent    Laundry independent    Shopping independent       Mental Status    General Appearance WDL WDL       Mental Status Summary    Recent Changes in Mental Status/Cognitive Functioning no changes                              Becky S. [place], RN

## 2024-05-04 ENCOUNTER — APPOINTMENT (OUTPATIENT)
Dept: NEUROLOGY | Facility: HOSPITAL | Age: 89
DRG: 369 | End: 2024-05-04
Payer: MEDICARE

## 2024-05-04 ENCOUNTER — ANESTHESIA EVENT (OUTPATIENT)
Dept: GASTROENTEROLOGY | Facility: HOSPITAL | Age: 89
End: 2024-05-04
Payer: MEDICARE

## 2024-05-04 ENCOUNTER — ANESTHESIA (OUTPATIENT)
Dept: GASTROENTEROLOGY | Facility: HOSPITAL | Age: 89
End: 2024-05-04
Payer: MEDICARE

## 2024-05-04 LAB
ANION GAP SERPL CALCULATED.3IONS-SCNC: 10.9 MMOL/L (ref 5–15)
BUN SERPL-MCNC: 35 MG/DL (ref 8–23)
BUN/CREAT SERPL: 42.2 (ref 7–25)
CALCIUM SPEC-SCNC: 8.2 MG/DL (ref 8.6–10.5)
CHLORIDE SERPL-SCNC: 106 MMOL/L (ref 98–107)
CO2 SERPL-SCNC: 25.1 MMOL/L (ref 22–29)
CREAT SERPL-MCNC: 0.83 MG/DL (ref 0.76–1.27)
DEPRECATED RDW RBC AUTO: 45.7 FL (ref 37–54)
EGFRCR SERPLBLD CKD-EPI 2021: 85.2 ML/MIN/1.73
ERYTHROCYTE [DISTWIDTH] IN BLOOD BY AUTOMATED COUNT: 13.2 % (ref 12.3–15.4)
GLUCOSE SERPL-MCNC: 104 MG/DL (ref 65–99)
HCT VFR BLD AUTO: 28.9 % (ref 37.5–51)
HCT VFR BLD AUTO: 29.8 % (ref 37.5–51)
HCT VFR BLD AUTO: 30.6 % (ref 37.5–51)
HCT VFR BLD AUTO: 30.6 % (ref 37.5–51)
HCT VFR BLD AUTO: 30.9 % (ref 37.5–51)
HGB BLD-MCNC: 10.1 G/DL (ref 13–17.7)
HGB BLD-MCNC: 10.2 G/DL (ref 13–17.7)
HGB BLD-MCNC: 10.2 G/DL (ref 13–17.7)
HGB BLD-MCNC: 9.6 G/DL (ref 13–17.7)
HGB BLD-MCNC: 9.9 G/DL (ref 13–17.7)
MAGNESIUM SERPL-MCNC: 2.2 MG/DL (ref 1.6–2.4)
MCH RBC QN AUTO: 31.3 PG (ref 26.6–33)
MCHC RBC AUTO-ENTMCNC: 33.3 G/DL (ref 31.5–35.7)
MCV RBC AUTO: 93.9 FL (ref 79–97)
PHOSPHATE SERPL-MCNC: 2.7 MG/DL (ref 2.5–4.5)
PLATELET # BLD AUTO: 204 10*3/MM3 (ref 140–450)
PMV BLD AUTO: 11.9 FL (ref 6–12)
POTASSIUM SERPL-SCNC: 3.3 MMOL/L (ref 3.5–5.2)
RBC # BLD AUTO: 3.26 10*6/MM3 (ref 4.14–5.8)
SODIUM SERPL-SCNC: 142 MMOL/L (ref 136–145)
WBC NRBC COR # BLD AUTO: 13.9 10*3/MM3 (ref 3.4–10.8)

## 2024-05-04 PROCEDURE — 85018 HEMOGLOBIN: CPT | Performed by: STUDENT IN AN ORGANIZED HEALTH CARE EDUCATION/TRAINING PROGRAM

## 2024-05-04 PROCEDURE — 85014 HEMATOCRIT: CPT | Performed by: STUDENT IN AN ORGANIZED HEALTH CARE EDUCATION/TRAINING PROGRAM

## 2024-05-04 PROCEDURE — 95816 EEG AWAKE AND DROWSY: CPT | Performed by: STUDENT IN AN ORGANIZED HEALTH CARE EDUCATION/TRAINING PROGRAM

## 2024-05-04 PROCEDURE — 25010000002 PROPOFOL 200 MG/20ML EMULSION: Performed by: STUDENT IN AN ORGANIZED HEALTH CARE EDUCATION/TRAINING PROGRAM

## 2024-05-04 PROCEDURE — 94799 UNLISTED PULMONARY SVC/PX: CPT

## 2024-05-04 PROCEDURE — 80048 BASIC METABOLIC PNL TOTAL CA: CPT | Performed by: STUDENT IN AN ORGANIZED HEALTH CARE EDUCATION/TRAINING PROGRAM

## 2024-05-04 PROCEDURE — 43235 EGD DIAGNOSTIC BRUSH WASH: CPT | Performed by: INTERNAL MEDICINE

## 2024-05-04 PROCEDURE — 85027 COMPLETE CBC AUTOMATED: CPT | Performed by: STUDENT IN AN ORGANIZED HEALTH CARE EDUCATION/TRAINING PROGRAM

## 2024-05-04 PROCEDURE — 25010000002 PROPOFOL 1000 MG/100ML EMULSION: Performed by: STUDENT IN AN ORGANIZED HEALTH CARE EDUCATION/TRAINING PROGRAM

## 2024-05-04 PROCEDURE — 94664 DEMO&/EVAL PT USE INHALER: CPT

## 2024-05-04 PROCEDURE — 25010000002 PHENYLEPHRINE 10 MG/ML SOLUTION: Performed by: STUDENT IN AN ORGANIZED HEALTH CARE EDUCATION/TRAINING PROGRAM

## 2024-05-04 PROCEDURE — 0DJ08ZZ INSPECTION OF UPPER INTESTINAL TRACT, VIA NATURAL OR ARTIFICIAL OPENING ENDOSCOPIC: ICD-10-PCS | Performed by: INTERNAL MEDICINE

## 2024-05-04 PROCEDURE — 95816 EEG AWAKE AND DROWSY: CPT

## 2024-05-04 PROCEDURE — 25810000003 LACTATED RINGERS PER 1000 ML: Performed by: STUDENT IN AN ORGANIZED HEALTH CARE EDUCATION/TRAINING PROGRAM

## 2024-05-04 PROCEDURE — 94761 N-INVAS EAR/PLS OXIMETRY MLT: CPT

## 2024-05-04 PROCEDURE — 25010000002 CEFTRIAXONE PER 250 MG: Performed by: STUDENT IN AN ORGANIZED HEALTH CARE EDUCATION/TRAINING PROGRAM

## 2024-05-04 PROCEDURE — 83735 ASSAY OF MAGNESIUM: CPT | Performed by: STUDENT IN AN ORGANIZED HEALTH CARE EDUCATION/TRAINING PROGRAM

## 2024-05-04 PROCEDURE — 84100 ASSAY OF PHOSPHORUS: CPT | Performed by: STUDENT IN AN ORGANIZED HEALTH CARE EDUCATION/TRAINING PROGRAM

## 2024-05-04 PROCEDURE — 25010000002 FUROSEMIDE PER 20 MG: Performed by: STUDENT IN AN ORGANIZED HEALTH CARE EDUCATION/TRAINING PROGRAM

## 2024-05-04 RX ORDER — SODIUM CHLORIDE 9 MG/ML
50 INJECTION, SOLUTION INTRAVENOUS CONTINUOUS
Status: CANCELLED | OUTPATIENT
Start: 2024-05-04

## 2024-05-04 RX ORDER — PROPOFOL 10 MG/ML
INJECTION, EMULSION INTRAVENOUS AS NEEDED
Status: DISCONTINUED | OUTPATIENT
Start: 2024-05-04 | End: 2024-05-04 | Stop reason: SURG

## 2024-05-04 RX ORDER — SODIUM CHLORIDE, SODIUM LACTATE, POTASSIUM CHLORIDE, CALCIUM CHLORIDE 600; 310; 30; 20 MG/100ML; MG/100ML; MG/100ML; MG/100ML
INJECTION, SOLUTION INTRAVENOUS CONTINUOUS PRN
Status: DISCONTINUED | OUTPATIENT
Start: 2024-05-04 | End: 2024-05-04 | Stop reason: SURG

## 2024-05-04 RX ORDER — LIDOCAINE HYDROCHLORIDE 20 MG/ML
INJECTION, SOLUTION INFILTRATION; PERINEURAL AS NEEDED
Status: DISCONTINUED | OUTPATIENT
Start: 2024-05-04 | End: 2024-05-04 | Stop reason: SURG

## 2024-05-04 RX ORDER — PHENYLEPHRINE HYDROCHLORIDE 10 MG/ML
INJECTION INTRAVENOUS AS NEEDED
Status: DISCONTINUED | OUTPATIENT
Start: 2024-05-04 | End: 2024-05-04 | Stop reason: SURG

## 2024-05-04 RX ORDER — PROPOFOL 10 MG/ML
INJECTION, EMULSION INTRAVENOUS CONTINUOUS PRN
Status: DISCONTINUED | OUTPATIENT
Start: 2024-05-04 | End: 2024-05-04 | Stop reason: SURG

## 2024-05-04 RX ORDER — POTASSIUM CHLORIDE 750 MG/1
40 TABLET, FILM COATED, EXTENDED RELEASE ORAL EVERY 4 HOURS
Status: COMPLETED | OUTPATIENT
Start: 2024-05-04 | End: 2024-05-04

## 2024-05-04 RX ADMIN — PHENYLEPHRINE HYDROCHLORIDE 100 MCG: 10 INJECTION INTRAVENOUS at 11:29

## 2024-05-04 RX ADMIN — PANTOPRAZOLE SODIUM 40 MG: 40 INJECTION, POWDER, FOR SOLUTION INTRAVENOUS at 01:28

## 2024-05-04 RX ADMIN — PROPOFOL INJECTABLE EMULSION 100 MG: 10 INJECTION, EMULSION INTRAVENOUS at 11:29

## 2024-05-04 RX ADMIN — DILTIAZEM HYDROCHLORIDE 30 MG: 30 TABLET, FILM COATED ORAL at 22:19

## 2024-05-04 RX ADMIN — PANTOPRAZOLE SODIUM 40 MG: 40 INJECTION, POWDER, FOR SOLUTION INTRAVENOUS at 22:18

## 2024-05-04 RX ADMIN — TIOTROPIUM BROMIDE INHALATION SPRAY 2 PUFF: 3.12 SPRAY, METERED RESPIRATORY (INHALATION) at 07:56

## 2024-05-04 RX ADMIN — DILTIAZEM HYDROCHLORIDE 30 MG: 30 TABLET, FILM COATED ORAL at 06:56

## 2024-05-04 RX ADMIN — Medication 10 ML: at 21:14

## 2024-05-04 RX ADMIN — LEVOTHYROXINE SODIUM 50 MCG: 50 TABLET ORAL at 13:27

## 2024-05-04 RX ADMIN — Medication 100 MCG: at 13:00

## 2024-05-04 RX ADMIN — POTASSIUM CHLORIDE 40 MEQ: 750 TABLET, EXTENDED RELEASE ORAL at 13:00

## 2024-05-04 RX ADMIN — Medication 10 ML: at 13:02

## 2024-05-04 RX ADMIN — BUDESONIDE AND FORMOTEROL FUMARATE DIHYDRATE 2 PUFF: 160; 4.5 AEROSOL RESPIRATORY (INHALATION) at 07:57

## 2024-05-04 RX ADMIN — PHENYLEPHRINE HYDROCHLORIDE 100 MCG: 10 INJECTION INTRAVENOUS at 11:35

## 2024-05-04 RX ADMIN — TAMSULOSIN HYDROCHLORIDE 0.4 MG: 0.4 CAPSULE ORAL at 13:00

## 2024-05-04 RX ADMIN — BUDESONIDE AND FORMOTEROL FUMARATE DIHYDRATE 2 PUFF: 160; 4.5 AEROSOL RESPIRATORY (INHALATION) at 20:58

## 2024-05-04 RX ADMIN — LIDOCAINE HYDROCHLORIDE 60 MG: 20 INJECTION, SOLUTION INFILTRATION; PERINEURAL at 11:29

## 2024-05-04 RX ADMIN — SODIUM CHLORIDE, SODIUM LACTATE, POTASSIUM CHLORIDE, AND CALCIUM CHLORIDE: 600; 310; 30; 20 INJECTION, SOLUTION INTRAVENOUS at 11:27

## 2024-05-04 RX ADMIN — FUROSEMIDE 40 MG: 10 INJECTION, SOLUTION INTRAMUSCULAR; INTRAVENOUS at 12:59

## 2024-05-04 RX ADMIN — ATORVASTATIN CALCIUM 20 MG: 20 TABLET, FILM COATED ORAL at 13:00

## 2024-05-04 RX ADMIN — DILTIAZEM HYDROCHLORIDE 30 MG: 30 TABLET, FILM COATED ORAL at 16:18

## 2024-05-04 RX ADMIN — PROPOFOL 100 MCG/KG/MIN: 10 INJECTION, EMULSION INTRAVENOUS at 11:29

## 2024-05-04 RX ADMIN — POTASSIUM CHLORIDE 40 MEQ: 750 TABLET, EXTENDED RELEASE ORAL at 18:31

## 2024-05-04 RX ADMIN — CEFTRIAXONE 2000 MG: 2 INJECTION, POWDER, FOR SOLUTION INTRAMUSCULAR; INTRAVENOUS at 13:02

## 2024-05-04 RX ADMIN — PANTOPRAZOLE SODIUM 40 MG: 40 INJECTION, POWDER, FOR SOLUTION INTRAVENOUS at 06:32

## 2024-05-04 NOTE — ANESTHESIA POSTPROCEDURE EVALUATION
Patient: Cuauhtemoc Buck    Procedure Summary       Date: 05/04/24 Room / Location:  [facility]    Anesthesia Start: 1125 Anesthesia Stop: 1141    Procedure: ESOPHAGOGASTRODUODENOSCOPY (Esophagus) Diagnosis:       Melena      (Melena [K92.1])    Surgeons: Paul Manning MD Provider: Tyler Peterson MD    Anesthesia Type: MAC ASA Status: 4 - Emergent            Anesthesia Type: MAC    Vitals  Vitals Value Taken Time   /78 05/04/24 1201   Temp     Pulse 94 05/04/24 1204   Resp 20 05/04/24 1151   SpO2 94 % 05/04/24 1204   Vitals shown include unfiled device data.        Post Anesthesia Care and Evaluation    Patient location during evaluation: bedside  Patient participation: complete - patient participated  Level of consciousness: awake and alert  Pain management: adequate    Airway patency: patent  Anesthetic complications: No anesthetic complications  PONV Status: controlled  Cardiovascular status: blood pressure returned to baseline and acceptable  Respiratory status: acceptable  Hydration status: acceptable

## 2024-05-04 NOTE — PROGRESS NOTES
"                                              LOS: 2 days   Patient Care Team:  System, Provider Not In as PCP - General  Peggy Trejo MD as Consulting Physician (Cardiology)    Chief Complaint:  F/up respiratory failure, COPD and abnormal chest imaging.    Subjective   Interval History  I reviewed the admission note, progress notes, PMH, PSH, Family hx, social history, imagings and prior records on this admission, summarized the finding in my note and formulated a transition of care plan.      On 2 L oxygen.  Denies dyspnea.  Reported no significant cough.    REVIEW OF SYSTEMS:   CARDIOVASCULAR: No chest pain, chest pressure or chest discomfort. No palpitations or edema.   GASTROINTESTINAL: No anorexia, nausea, vomiting or diarrhea. No abdominal pain.  CONSTITUTIONAL: No fever or chills.     Ventilator/Non-Invasive Ventilation Settings (From admission, onward)      None                  Physical Exam:     Vital Signs  Temp:  [98.2 °F (36.8 °C)-99 °F (37.2 °C)] 98.2 °F (36.8 °C)  Heart Rate:  [] 106  Resp:  [18-24] 20  BP: ()/(58-78) 102/62    Intake/Output Summary (Last 24 hours) at 5/4/2024 1029  Last data filed at 5/4/2024 0659  Gross per 24 hour   Intake 300 ml   Output 2650 ml   Net -2350 ml     Flowsheet Rows      Flowsheet Row First Filed Value   Admission Height 177.8 cm (70\") Documented at 05/02/2024 2055   Admission Weight 81.6 kg (180 lb) Documented at 05/02/2024 2055            PPE used per hospital policy    General Appearance:   Alert, cooperative, in no acute distress   ENMT:  Mallampati score 2, dry mucous membrane   Eyes:  Pupils equal and reactive to light. EOMI   Neck:    Trachea midline. No thyromegaly.   Lungs:   Mild bilateral crackles more prominent at the bases.  No wheezing.  No use of accessory muscles.    Heart:   irregular rhythm but normal rate.  No audible murmur.   Skin:   No rash or ecchymosis   Abdomen:    Obese. Soft. No tenderness. No HSM.   Neuro/psych:  " Conscious, alert, oriented x3. Strength 5/5 in upper and lower  ext.  Appropriate mood and affect   Extremities:  No cyanosis, clubbing or edema.  Warm extremities and well-perfused          Results Review:        Results from last 7 days   Lab Units 05/04/24  0554 05/03/24  0621 05/02/24 2101   SODIUM mmol/L 142 140 138   POTASSIUM mmol/L 3.3* 4.0 4.3   CHLORIDE mmol/L 106 106 102   CO2 mmol/L 25.1 26.0 24.9   BUN mg/dL 35* 35* 30*   CREATININE mg/dL 0.83 0.70* 0.79   GLUCOSE mg/dL 104* 143* 127*   CALCIUM mg/dL 8.2* 8.1* 8.4*     Results from last 7 days   Lab Units 05/02/24 2101   HSTROP T ng/L 22*     Results from last 7 days   Lab Units 05/04/24  0554 05/04/24  0014 05/03/24  2107 05/03/24  1148 05/03/24 0621 05/02/24 2101   WBC 10*3/mm3 13.90*  --   --   --  14.41* 7.51   HEMOGLOBIN g/dL 10.2*  10.2* 9.9* 9.4*   < > 9.5* 11.5*   HEMATOCRIT % 30.6*  30.6* 29.8* 28.5*   < > 27.9* 35.2*   PLATELETS 10*3/mm3 204  --   --   --  201 227    < > = values in this interval not displayed.     Results from last 7 days   Lab Units 05/02/24 2101   INR  1.45*   APTT seconds 36.8*     Results from last 7 days   Lab Units 05/02/24 2101   PROBNP pg/mL 738.0                           I reviewed the patient's new clinical results.        Medication Review:   atorvastatin, 20 mg, Oral, Daily  budesonide-formoterol, 2 puff, Inhalation, BID - RT  cefTRIAXone, 2,000 mg, Intravenous, Q24H  dilTIAZem, 30 mg, Oral, Q8H  furosemide, 40 mg, Intravenous, BID  levothyroxine, 50 mcg, Oral, Daily  pantoprazole, 80 mg, Intravenous, Once  potassium chloride, 40 mEq, Oral, Q4H  sodium chloride, 10 mL, Intravenous, Q12H  tamsulosin, 0.4 mg, Oral, Daily  tiotropium bromide monohydrate, 2 puff, Inhalation, Daily - RT  vitamin B-12, 100 mcg, Oral, Daily        pantoprazole, 40 mg, Last Rate: 40 mg (05/04/24 0632)        Diagnostic imaging:  I personally and independently reviewed the following images:  CT chest 5/2/2024: Upper lobe  predominant emphysema.  Increased peribronchial thickening.  Bibasilar changes consistent with pulmonary fibrosis and more prominent at the left base.  Present on CT in 2020    Assessment       Acute on chronic hypoxic respiratory failure.  COPD, no current exacerbation  Pulmonary fibrosis he has some bibasilar scarring with some traction bronchiectasis the pattern is not classic UIP pattern I do not see a lot of groundglass type opacities either to suggest an NSIP it is bibasilar this has the appearance of more of a chronic inflammatory process I wonder if this is due to recurrent aspiration.  Is something he will certainly need to follow, I do not any history of significant exposures to explain this or any autoimmune connective tissue type diseases.  EDILMA, on home PAP  Pulmonary hypertension on prior echo, evident on latest echo 5/3/2024.    Pertinent medical problems:   Probable generalized seizure.  A-fib with RVR  Upper GI bleed  ABLA    All problems new to me    Plan     Maintenance bronchodilators: Symbicort twice daily and Spiriva 2 puffs daily.  DuoNeb 4 times a day as needed.  No absolute contraindication for EGD from pulmonary perspective.  Incentive spirometry  Diuresis with Lasix for CHF          Manuel Cuevas MD  05/04/24  10:29 EDT          This note was dictated utilizing Dragon dictation

## 2024-05-04 NOTE — ANESTHESIA PREPROCEDURE EVALUATION
Anesthesia Evaluation     Patient summary reviewed and Nursing notes reviewed   no history of anesthetic complications:   NPO Solid Status: > 8 hours  NPO Liquid Status: > 2 hours           Airway   Mallampati: II  TM distance: >3 FB  Neck ROM: full  Dental      Pulmonary    (+) COPD,home oxygen, sleep apnea  Cardiovascular     ECG reviewed    (+) hypertension, dysrhythmias Atrial Fib, hyperlipidemia      Neuro/Psych  GI/Hepatic/Renal/Endo    (+) thyroid problem hypothyroidism    Musculoskeletal     Abdominal    Substance History      OB/GYN          Other   blood dyscrasia anemia,                       Anesthesia Plan    ASA 4 - emergent     MAC     intravenous induction     Anesthetic plan, risks, benefits, and alternatives have been provided, discussed and informed consent has been obtained with: patient.        CODE STATUS:    Code Status (Patient has no pulse and is not breathing): CPR (Attempt to Resuscitate)  Medical Interventions (Patient has pulse or is breathing): Full Support

## 2024-05-04 NOTE — PLAN OF CARE
Goal Outcome Evaluation:  Plan of Care Reviewed With: patient        Progress: improving  Outcome Evaluation: Protonix gtt continues. No BM, no blood noted this shift. EGD today.  Good output. Hgb stable.

## 2024-05-04 NOTE — PLAN OF CARE
Goal Outcome Evaluation:         VSS, Pt A&O x4, RA, Afib- EEG completed, neurology notified. Pt on clear liquids and tolerating well. Messaged Dr to advance diet as tolerated. Awaiting MRI

## 2024-05-04 NOTE — PROGRESS NOTES
Name: Cuauhtemoc Buck ADMIT: 2024   : 1935  PCP: System, Provider Not In    MRN: [id number] LOS: 2 days   AGE/SEX: 88 y.o. male  ROOM: ENDO/ENDO     Subjective   Subjective   Patient seen this morning, continues to have multiple black/dark stools yesterday, hemoglobin was above 9, however 1 unit of PRBC was transfused in the setting of ongoing bleeding, A-fib with RVR and soft BP.  No signs of bleeding this morning per patient.  Denies further hematemesis, denies nausea, abdominal pain.  Remains on IV heparin drip.    Review of Systems  As above     Objective   Objective   Vital Signs  Temp:  [98.2 °F (36.8 °C)-99 °F (37.2 °C)] 98.2 °F (36.8 °C)  Heart Rate:  [] 106  Resp:  [18-24] 20  BP: ()/(58-78) 102/62  SpO2:  [94 %-98 %] 95 %  on  Flow (L/min):  [2-3] 2;   Device (Oxygen Therapy): nasal cannula  Body mass index is 26.03 kg/m².  Physical Exam  General: Alert and oriented x3, no acute distress  HEENT: Normocephalic, atraumatic  CV: Irregular rhythm, tachycardic  Lungs: Faint crackles bilaterally, no wheezing, on 2 L nasal cannula  Abdomen: Soft, nontender, nondistended  Extremities: No significant peripheral edema , no cyanosi  Results Review     I reviewed the patient's new clinical results.  Results from last 7 days   Lab Units 24  0554 24  0014 24  1148 24  210   WBC 10*3/mm3 13.90*  --   --   --  14.41* 7.51   HEMOGLOBIN g/dL 10.2*  10.2* 9.9* 9.4* 9.2* 9.5* 11.5*   PLATELETS 10*3/mm3 204  --   --   --  201 227     Results from last 7 days   Lab Units 24  0554 24  0621 24  2101   SODIUM mmol/L 142 140 138   POTASSIUM mmol/L 3.3* 4.0 4.3   CHLORIDE mmol/L 106 106 102   CO2 mmol/L 25.1 26.0 24.9   BUN mg/dL 35* 35* 30*   CREATININE mg/dL 0.83 0.70* 0.79   GLUCOSE mg/dL 104* 143* 127*   Estimated Creatinine Clearance: 74.4 mL/min (by C-G formula based on SCr of 0.83 mg/dL).  Results from last 7 days   Lab Units  "05/02/24  2101   ALBUMIN g/dL 3.1*   BILIRUBIN mg/dL 0.8   ALK PHOS U/L 70   AST (SGOT) U/L 21   ALT (SGPT) U/L 17     Results from last 7 days   Lab Units 05/04/24  0554 05/03/24  0621 05/02/24  2101   CALCIUM mg/dL 8.2* 8.1* 8.4*   ALBUMIN g/dL  --   --  3.1*   MAGNESIUM mg/dL 2.2 2.1  --    PHOSPHORUS mg/dL 2.7 3.4  --      Results from last 7 days   Lab Units 05/03/24  0621   PROCALCITONIN ng/mL <0.02     COVID19   Date Value Ref Range Status   05/02/2024 Not Detected Not Detected - Ref. Range Final   12/23/2020 Not Detected Not Detected - Ref. Range Final     No results found for: \"HGBA1C\", \"POCGLU\"        EEG  Date of onset: 5/4/24  Date of offset: 5/4/24    Indication: new onset seizure    Technical description:  This is a 21 channel digital EEG recording that   began on 0916 and ended on 0942.    Background:  The background consists of a posteriorly dominant rhythm that   is notably absent.  Anteriorly, there is diffuse theta greater than delta   activity when awake .  There is fair spontaneous variability.     Hyperventilation was not performed and photic stimulation did not induce   an abnormal driving response.  There is no focal slowing.  There are no   interictal epileptiform discharges and no electrographic seizures noted   during the study.    EKG demonstrates normal rate.     Impression:  This is an abnormal study due to the presence of diffuse theta greater   than delta slowing.  There are no interictal epileptiform discharges and   no electrographic seizures.  These electrophysiologic findings are   indicative of moderate encephalopathy.         Scheduled Medications  atorvastatin, 20 mg, Oral, Daily  budesonide-formoterol, 2 puff, Inhalation, BID - RT  cefTRIAXone, 2,000 mg, Intravenous, Q24H  dilTIAZem, 30 mg, Oral, Q8H  furosemide, 40 mg, Intravenous, BID  levothyroxine, 50 mcg, Oral, Daily  pantoprazole, 80 mg, Intravenous, Once  potassium chloride, 40 mEq, Oral, Q4H  sodium chloride, 10 mL, " Intravenous, Q12H  tamsulosin, 0.4 mg, Oral, Daily  tiotropium bromide monohydrate, 2 puff, Inhalation, Daily - RT  vitamin B-12, 100 mcg, Oral, Daily    Infusions  pantoprazole, 40 mg, Last Rate: 40 mg (05/04/24 0632)    Diet  NPO Diet NPO Type: Sips with Meds    I have personally reviewed     [x]  Laboratory   [x]  Microbiology   [x]  Radiology   []  EKG/Telemetry  []  Cardiology/Vascular   []  Pathology    []  Records       Assessment/Plan     Active Hospital Problems    Diagnosis  POA    **Atrial fibrillation with rapid ventricular response [I48.91]  Yes    Hematemesis [K92.0]  Yes    Melena [K92.1]  Unknown    PAF (paroxysmal atrial fibrillation) [I48.0]  Yes    Paroxysmal atrial flutter [I48.92]  Yes    Benign essential hypertension [I10]  Yes    Mixed hyperlipidemia [E78.2]  Yes    Chronic obstructive pulmonary disease [J44.9]  Yes      Resolved Hospital Problems   No resolved problems to display.       Patient is a 88-year-old male with a history of including but not limited to paroxysmal atrial flutter/A-fib on Eliquis, hypertension, COPD, presented to the ED complaining of worsening shortness of breath and lower extremity edema.  Patient had 2 episodes of hematemesis and seizure-like activity while undergoing CTA     Hematemesis/GI bleed  -Patient with 2 episodes of hematemesis, +melena on admission  -Hemoglobin was 11.5 on admission, dropped to as low as 9.2, status post 1 unit of PRBC transfused secondary to ongoing bleeding, A-fib with RVR/soft BP.  Hemoglobin improved, remained stable.  -Eliquis held, continue PPI drip, H&H every 6 hours  -GI following, plan for EGD today     A-fib RVR  -Cardiology following.  -On diltiazem, IV diuretics per cardiology  -Heart rate improved        Pulmonary fibrosis/chronic hypoxic respiratory failure on 3 L nasal at baseline/COPD  -CT scan showed background fibrotic changes. There may be some increasing consolidation within the left lower lobe when compared to the  prior exam.   -Concern for aspiration secondary to vomiting  -IV ceftriaxone  -Continue Symbicort, tiotropium  -Pulmonology following     Essential hypertension  -BP soft secondary to A-fib RVR  -On diltiazem with holding parameters, monitor     Seizure-like activity  -Patient had seizure like activity with urinary incontinence while undergoing CTA  -Neurology following, plan for MRI and EEG  -Antiepileptics on hold     Hypokalemia  -Potassium 3.3,  -Replacement ordered        Discussed with patient  Discussed with neurology  Disposition: Home, timing to be determined          Copied text in this note has been reviewed and is accurate as of 05/04/24.         Dictated utilizing Dragon dictation        Anh Costa MD  [facility]  05/04/24  11:17 EDT

## 2024-05-05 ENCOUNTER — APPOINTMENT (OUTPATIENT)
Dept: MRI IMAGING | Facility: HOSPITAL | Age: 89
DRG: 369 | End: 2024-05-05
Payer: MEDICARE

## 2024-05-05 LAB
ANION GAP SERPL CALCULATED.3IONS-SCNC: 7.6 MMOL/L (ref 5–15)
BUN SERPL-MCNC: 25 MG/DL (ref 8–23)
BUN/CREAT SERPL: 31.6 (ref 7–25)
CALCIUM SPEC-SCNC: 8.3 MG/DL (ref 8.6–10.5)
CHLORIDE SERPL-SCNC: 107 MMOL/L (ref 98–107)
CO2 SERPL-SCNC: 26.4 MMOL/L (ref 22–29)
CREAT SERPL-MCNC: 0.79 MG/DL (ref 0.76–1.27)
DEPRECATED RDW RBC AUTO: 42.8 FL (ref 37–54)
EGFRCR SERPLBLD CKD-EPI 2021: 86.5 ML/MIN/1.73
ERYTHROCYTE [DISTWIDTH] IN BLOOD BY AUTOMATED COUNT: 12.9 % (ref 12.3–15.4)
GLUCOSE SERPL-MCNC: 102 MG/DL (ref 65–99)
HCT VFR BLD AUTO: 26.8 % (ref 37.5–51)
HCT VFR BLD AUTO: 27.4 % (ref 37.5–51)
HCT VFR BLD AUTO: 28.1 % (ref 37.5–51)
HCT VFR BLD AUTO: 29.8 % (ref 37.5–51)
HGB BLD-MCNC: 8.8 G/DL (ref 13–17.7)
HGB BLD-MCNC: 8.8 G/DL (ref 13–17.7)
HGB BLD-MCNC: 9.3 G/DL (ref 13–17.7)
HGB BLD-MCNC: 9.9 G/DL (ref 13–17.7)
MAGNESIUM SERPL-MCNC: 2.2 MG/DL (ref 1.6–2.4)
MCH RBC QN AUTO: 30.6 PG (ref 26.6–33)
MCHC RBC AUTO-ENTMCNC: 33.1 G/DL (ref 31.5–35.7)
MCV RBC AUTO: 92.4 FL (ref 79–97)
PHOSPHATE SERPL-MCNC: 2.6 MG/DL (ref 2.5–4.5)
PLATELET # BLD AUTO: 211 10*3/MM3 (ref 140–450)
PMV BLD AUTO: 11.3 FL (ref 6–12)
POTASSIUM SERPL-SCNC: 3.8 MMOL/L (ref 3.5–5.2)
RBC # BLD AUTO: 3.04 10*6/MM3 (ref 4.14–5.8)
SODIUM SERPL-SCNC: 141 MMOL/L (ref 136–145)
WBC NRBC COR # BLD AUTO: 8.86 10*3/MM3 (ref 3.4–10.8)

## 2024-05-05 PROCEDURE — 80048 BASIC METABOLIC PNL TOTAL CA: CPT | Performed by: INTERNAL MEDICINE

## 2024-05-05 PROCEDURE — 85014 HEMATOCRIT: CPT | Performed by: STUDENT IN AN ORGANIZED HEALTH CARE EDUCATION/TRAINING PROGRAM

## 2024-05-05 PROCEDURE — 94799 UNLISTED PULMONARY SVC/PX: CPT

## 2024-05-05 PROCEDURE — 25010000002 CEFTRIAXONE PER 250 MG: Performed by: INTERNAL MEDICINE

## 2024-05-05 PROCEDURE — 94664 DEMO&/EVAL PT USE INHALER: CPT

## 2024-05-05 PROCEDURE — 99232 SBSQ HOSP IP/OBS MODERATE 35: CPT | Performed by: NURSE PRACTITIONER

## 2024-05-05 PROCEDURE — 84100 ASSAY OF PHOSPHORUS: CPT | Performed by: INTERNAL MEDICINE

## 2024-05-05 PROCEDURE — 99232 SBSQ HOSP IP/OBS MODERATE 35: CPT | Performed by: PSYCHIATRY & NEUROLOGY

## 2024-05-05 PROCEDURE — 85018 HEMOGLOBIN: CPT | Performed by: STUDENT IN AN ORGANIZED HEALTH CARE EDUCATION/TRAINING PROGRAM

## 2024-05-05 PROCEDURE — 85027 COMPLETE CBC AUTOMATED: CPT | Performed by: INTERNAL MEDICINE

## 2024-05-05 PROCEDURE — 70551 MRI BRAIN STEM W/O DYE: CPT

## 2024-05-05 PROCEDURE — 94761 N-INVAS EAR/PLS OXIMETRY MLT: CPT

## 2024-05-05 PROCEDURE — 83735 ASSAY OF MAGNESIUM: CPT | Performed by: INTERNAL MEDICINE

## 2024-05-05 PROCEDURE — 99232 SBSQ HOSP IP/OBS MODERATE 35: CPT | Performed by: INTERNAL MEDICINE

## 2024-05-05 RX ORDER — PANTOPRAZOLE SODIUM 40 MG/1
40 TABLET, DELAYED RELEASE ORAL
Status: DISCONTINUED | OUTPATIENT
Start: 2024-05-05 | End: 2024-05-07 | Stop reason: HOSPADM

## 2024-05-05 RX ADMIN — DILTIAZEM HYDROCHLORIDE 30 MG: 30 TABLET, FILM COATED ORAL at 20:08

## 2024-05-05 RX ADMIN — BUDESONIDE AND FORMOTEROL FUMARATE DIHYDRATE 2 PUFF: 160; 4.5 AEROSOL RESPIRATORY (INHALATION) at 10:30

## 2024-05-05 RX ADMIN — DILTIAZEM HYDROCHLORIDE 30 MG: 30 TABLET, FILM COATED ORAL at 15:53

## 2024-05-05 RX ADMIN — PANTOPRAZOLE SODIUM 40 MG: 40 INJECTION, POWDER, FOR SOLUTION INTRAVENOUS at 02:52

## 2024-05-05 RX ADMIN — Medication 10 ML: at 08:42

## 2024-05-05 RX ADMIN — Medication 100 MCG: at 08:41

## 2024-05-05 RX ADMIN — PANTOPRAZOLE SODIUM 40 MG: 40 TABLET, DELAYED RELEASE ORAL at 13:31

## 2024-05-05 RX ADMIN — Medication 10 ML: at 20:08

## 2024-05-05 RX ADMIN — TAMSULOSIN HYDROCHLORIDE 0.4 MG: 0.4 CAPSULE ORAL at 08:41

## 2024-05-05 RX ADMIN — PANTOPRAZOLE SODIUM 40 MG: 40 INJECTION, POWDER, FOR SOLUTION INTRAVENOUS at 08:23

## 2024-05-05 RX ADMIN — BUDESONIDE AND FORMOTEROL FUMARATE DIHYDRATE 2 PUFF: 160; 4.5 AEROSOL RESPIRATORY (INHALATION) at 21:08

## 2024-05-05 RX ADMIN — DILTIAZEM HYDROCHLORIDE 30 MG: 30 TABLET, FILM COATED ORAL at 06:25

## 2024-05-05 RX ADMIN — CEFTRIAXONE 2000 MG: 2 INJECTION, POWDER, FOR SOLUTION INTRAMUSCULAR; INTRAVENOUS at 08:41

## 2024-05-05 RX ADMIN — SENNOSIDES AND DOCUSATE SODIUM 2 TABLET: 50; 8.6 TABLET ORAL at 17:47

## 2024-05-05 RX ADMIN — POLYETHYLENE GLYCOL 3350 17 G: 17 POWDER, FOR SOLUTION ORAL at 20:12

## 2024-05-05 RX ADMIN — LEVOTHYROXINE SODIUM 50 MCG: 50 TABLET ORAL at 06:25

## 2024-05-05 RX ADMIN — PANTOPRAZOLE SODIUM 40 MG: 40 TABLET, DELAYED RELEASE ORAL at 20:08

## 2024-05-05 RX ADMIN — ATORVASTATIN CALCIUM 20 MG: 20 TABLET, FILM COATED ORAL at 08:41

## 2024-05-05 RX ADMIN — TIOTROPIUM BROMIDE INHALATION SPRAY 2 PUFF: 3.12 SPRAY, METERED RESPIRATORY (INHALATION) at 10:32

## 2024-05-05 NOTE — PLAN OF CARE
Goal Outcome Evaluation:  Plan of Care Reviewed With: patient        Progress: improving  Outcome Evaluation: Pt on 2L NC. Protonix gtt continues. No bleeding noted this shift. Urinal at bedside. Advance diet. Rested well. Hgb stable. VSS.

## 2024-05-05 NOTE — SIGNIFICANT NOTE
05/05/24 1613   OTHER   Discipline physical therapist   Rehab Time/Intention   Session Not Performed other (see comments)  (Attempted eval in AM, pt asleep. Attempted again in PM, pt found asleep. When aroused denied therapy due to being tired. PT will attempt follow up tomorrow.)   Recommendation   PT - Next Appointment 05/06/24

## 2024-05-05 NOTE — PROGRESS NOTES
Name: Cuauhtemoc Buck ADMIT: 2024   : 1935  PCP: System, Provider Not In    MRN: [id number] LOS: 3 days   AGE/SEX: 88 y.o. male  ROOM: [facility]     Subjective   Subjective   Sitting up in the bed, no acute events overnight.  Status post EGD yesterday, which showed superfical esophageal ulcer suggestive of non-bleeding Brandie-Jose tear but no signs of recent bleeding was found.  Hemoglobin remained stable, remains on IV PPI drip.  Denies shortness of breath, chest pain, fevers or chills.    Review of Systems  As above     Objective   Objective   Vital Signs  Temp:  [97.5 °F (36.4 °C)-98.8 °F (37.1 °C)] 97.5 °F (36.4 °C)  Heart Rate:  [] 101  Resp:  [18-24] 18  BP: (101-154)/(44-78) 101/44  SpO2:  [91 %-100 %] 94 %  on  Flow (L/min):  [2-3] 2;   Device (Oxygen Therapy): nasal cannula  Body mass index is 26.03 kg/m².  Physical Exam    General: Alert and oriented x3, no acute distress  HEENT: Normocephalic, atraumatic  CV: Irregular rhythm, normal rate  Lungs: Faint crackles bilaterally, no wheezing, on 2 L nasal cannula  Abdomen: Soft, nontender, nondistended  Extremities: No significant peripheral edema , no cyanosis     Results Review     I reviewed the patient's new clinical results.  Results from last 7 days   Lab Units 24  0725 24  2337 24  2214 24  1322 24  0554 24  1148 24  0621 24  2101   WBC 10*3/mm3 8.86  --   --   --  13.90*  --  14.41* 7.51   HEMOGLOBIN g/dL 9.3* 9.9* 9.6* 10.1* 10.2*  10.2*   < > 9.5* 11.5*   PLATELETS 10*3/mm3 211  --   --   --  204  --  201 227    < > = values in this interval not displayed.     Results from last 7 days   Lab Units 24  0725 24  0554 24  0621 24  2101   SODIUM mmol/L 141 142 140 138   POTASSIUM mmol/L 3.8 3.3* 4.0 4.3   CHLORIDE mmol/L 107 106 106 102   CO2 mmol/L 26.4 25.1 26.0 24.9   BUN mg/dL 25* 35* 35* 30*   CREATININE mg/dL 0.79 0.83 0.70* 0.79   GLUCOSE mg/dL 102* 104* 143*  "127*   Estimated Creatinine Clearance: 78.1 mL/min (by C-G formula based on SCr of 0.79 mg/dL).  Results from last 7 days   Lab Units 05/02/24  2101   ALBUMIN g/dL 3.1*   BILIRUBIN mg/dL 0.8   ALK PHOS U/L 70   AST (SGOT) U/L 21   ALT (SGPT) U/L 17     Results from last 7 days   Lab Units 05/05/24  0725 05/04/24  0554 05/03/24  0621 05/02/24  2101   CALCIUM mg/dL 8.3* 8.2* 8.1* 8.4*   ALBUMIN g/dL  --   --   --  3.1*   MAGNESIUM mg/dL 2.2 2.2 2.1  --    PHOSPHORUS mg/dL 2.6 2.7 3.4  --      Results from last 7 days   Lab Units 05/03/24  0621   PROCALCITONIN ng/mL <0.02     COVID19   Date Value Ref Range Status   05/02/2024 Not Detected Not Detected - Ref. Range Final   12/23/2020 Not Detected Not Detected - Ref. Range Final     No results found for: \"HGBA1C\", \"POCGLU\"        EEG  Date of onset: 5/4/24  Date of offset: 5/4/24    Indication: new onset seizure    Technical description:  This is a 21 channel digital EEG recording that   began on 0916 and ended on 0942.    Background:  The background consists of a posteriorly dominant rhythm that   is notably absent.  Anteriorly, there is diffuse theta greater than delta   activity when awake .  There is fair spontaneous variability.     Hyperventilation was not performed and photic stimulation did not induce   an abnormal driving response.  There is no focal slowing.  There are no   interictal epileptiform discharges and no electrographic seizures noted   during the study.    EKG demonstrates normal rate.     Impression:  This is an abnormal study due to the presence of diffuse theta greater   than delta slowing.  There are no interictal epileptiform discharges and   no electrographic seizures.  These electrophysiologic findings are   indicative of moderate encephalopathy.         Scheduled Medications  atorvastatin, 20 mg, Oral, Daily  budesonide-formoterol, 2 puff, Inhalation, BID - RT  cefTRIAXone, 2,000 mg, Intravenous, Q24H  dilTIAZem, 30 mg, Oral, " Q8H  levothyroxine, 50 mcg, Oral, Daily  pantoprazole, 40 mg, Oral, BID AC  sodium chloride, 10 mL, Intravenous, Q12H  tamsulosin, 0.4 mg, Oral, Daily  tiotropium bromide monohydrate, 2 puff, Inhalation, Daily - RT  vitamin B-12, 100 mcg, Oral, Daily    Infusions     Diet  Diet: Regular/House, Gastrointestinal; Low Irritant; Texture: Soft to Chew (NDD 3); Soft to Chew: Whole Meat; Fluid Consistency: Thin (IDDSI 0)    I have personally reviewed     [x]  Laboratory   [x]  Microbiology   []  Radiology   []  EKG/Telemetry  []  Cardiology/Vascular   []  Pathology    []  Records       Assessment/Plan     Active Hospital Problems    Diagnosis  POA    **Atrial fibrillation with rapid ventricular response [I48.91]  Yes    Hematemesis [K92.0]  Yes    Melena [K92.1]  Unknown    PAF (paroxysmal atrial fibrillation) [I48.0]  Yes    Paroxysmal atrial flutter [I48.92]  Yes    Benign essential hypertension [I10]  Yes    Mixed hyperlipidemia [E78.2]  Yes    Chronic obstructive pulmonary disease [J44.9]  Yes      Resolved Hospital Problems   No resolved problems to display.       Patient is a 88-year-old male with a history of including but not limited to paroxysmal atrial flutter/A-fib on Eliquis, hypertension, COPD, presented to the ED complaining of worsening shortness of breath and lower extremity edema.  Patient had 2 episodes of hematemesis and seizure-like activity while undergoing CTA     Hematemesis/GI bleed  -Patient with 2 episodes of hematemesis, +melena on admission  -Hemoglobin was 11.5 on admission, dropped to as low as 9.2, status post 1 unit of PRBC transfused secondary to ongoing bleeding, A-fib with RVR/soft BP.    -Status post EGD 05/04--is also A 10mm superfical esophageal ulcer suggestive of non-bleeding Brandie-Jose tear with no stigmata of recent bleeding was found. The tear was linear extending from just above GEJ to proximal cardia.  GI recommended to continue PPI gtt x 24hrs, then oral BID   repeat EGD in  3 months recommended  -Eliquis on hold, resume once cleared by GI  -Hemoglobin stable.  Repeat H&H this afternoon.         A-fib RVR  -Cardiology following.  -On diltiazem, as needed IV diuretics per cardiology  -Heart rate improved        Pulmonary fibrosis/chronic hypoxic respiratory failure on 3 L nasal at baseline/COPD  -CT scan showed background fibrotic changes. There may be some increasing consolidation within the left lower lobe when compared to the prior exam.   -Concern for aspiration secondary to vomiting  -On IV ceftriaxone for possible pneumonia, continue for 5 days  -WBC normalized  -Continue Symbicort, tiotropium  -Pulmonology following  -     Essential hypertension  -BP soft secondary to A-fib RVR  -On diltiazem with holding parameters  -Cardiology managing     Seizure-like activity  -Patient had seizure like activity with urinary incontinence while undergoing CTA  -Routine EEG with no epileptic activity  -MRI brain pending  -Neurology following, likely provoked seizure or vasovagal syncope with syncopal convulsion   -No driving for 90 days per [place] law        Hypokalemia  -Improved.  Monitor.      Discussed with patient  Disposition: Home, timing to be determined, likely 1-2 days if hemoglobin stable, cleared by consultants.          Copied text in this note has been reviewed and is accurate as of 05/05/24.         Dictated utilizing Dragon dictation        Anh Costa MD  [facility]  05/05/24  10:40 EDT

## 2024-05-05 NOTE — PROGRESS NOTES
Gastroenterology   Inpatient Progress Note    Reason for Follow Up:  GI Bleed    Subjective  Interval History:   EGD May 4, 2024 with 10 mm superficial ulcer digestive of nonbleeding Brandie-Jose tear with no stigmata of recent bleeding    Patient is tolerating oral intake, denies abdominal pain, nausea, vomiting, continues on oxygen via nasal cannula    Current Facility-Administered Medications:     acetaminophen (TYLENOL) tablet 650 mg, 650 mg, Oral, Q4H PRN **OR** acetaminophen (TYLENOL) 160 MG/5ML oral solution 650 mg, 650 mg, Oral, Q4H PRN **OR** acetaminophen (TYLENOL) suppository 650 mg, 650 mg, Rectal, Q4H PRN, Paul Manning MD    atorvastatin (LIPITOR) tablet 20 mg, 20 mg, Oral, Daily, Paul Manning MD, 20 mg at 05/04/24 1300    sennosides-docusate (PERICOLACE) 8.6-50 MG per tablet 2 tablet, 2 tablet, Oral, BID PRN **AND** polyethylene glycol (MIRALAX) packet 17 g, 17 g, Oral, Daily PRN **AND** bisacodyl (DULCOLAX) EC tablet 5 mg, 5 mg, Oral, Daily PRN **AND** bisacodyl (DULCOLAX) suppository 10 mg, 10 mg, Rectal, Daily PRN, Paul Manning MD    budesonide-formoterol (SYMBICORT) 160-4.5 MCG/ACT inhaler 2 puff, 2 puff, Inhalation, BID - RT, Paul Manning MD, 2 puff at 05/04/24 2058    cefTRIAXone (ROCEPHIN) 2,000 mg in sodium chloride 0.9 % 100 mL MBP, 2,000 mg, Intravenous, Q24H, Paul Manning MD, Last Rate: 200 mL/hr at 05/04/24 1302, 2,000 mg at 05/04/24 1302    dilTIAZem (CARDIZEM) tablet 30 mg, 30 mg, Oral, Q8H, Paul Manning MD, 30 mg at 05/05/24 0625    ipratropium-albuterol (DUO-NEB) nebulizer solution 3 mL, 3 mL, Nebulization, 4x Daily PRN, Paul Manning MD    levothyroxine (SYNTHROID, LEVOTHROID) tablet 50 mcg, 50 mcg, Oral, Daily, Paul Manning MD, 50 mcg at 05/05/24 0625    nitroglycerin (NITROSTAT) SL tablet 0.4 mg, 0.4 mg, Sublingual, Q5 Min PRN, Paul Manning MD    pantoprazole (PROTONIX) injection 80 mg, 80 mg,  Intravenous, Once **AND** pantoprazole (PROTONIX) 40 mg in sodium chloride 0.9 % 100 mL (0.4 mg/mL) MBP, 40 mg, Intravenous, Continuous, Paul Manning MD, Last Rate: 20 mL/hr at 05/05/24 0252, 40 mg at 05/05/24 0252    sodium chloride 0.9 % flush 10 mL, 10 mL, Intravenous, PRN, Paul Manning MD    sodium chloride 0.9 % flush 10 mL, 10 mL, Intravenous, Q12H, Paul Manning MD, 10 mL at 05/04/24 2114    sodium chloride 0.9 % flush 10 mL, 10 mL, Intravenous, PRN, Paul Manning MD    sodium chloride 0.9 % infusion 40 mL, 40 mL, Intravenous, PRN, Paul Manning MD    tamsulosin (FLOMAX) 24 hr capsule 0.4 mg, 0.4 mg, Oral, Daily, Paul Manning MD, 0.4 mg at 05/04/24 1300    tiotropium (SPIRIVA RESPIMAT) 2.5 mcg/act aerosol solution inhaler, 2 puff, Inhalation, Daily - RT, Paul Manning MD, 2 puff at 05/04/24 0756    vitamin B-12 (CYANOCOBALAMIN) tablet 100 mcg, 100 mcg, Oral, Daily, Paul Manning MD, 100 mcg at 05/04/24 1300  Review of Systems:               The following systems were reviewed and negative;  gastrointestinal    Objective     Vital Signs  Temp:  [97.5 °F (36.4 °C)-98.8 °F (37.1 °C)] 97.5 °F (36.4 °C)  Heart Rate:  [] 112  Resp:  [18-24] 18  BP: (101-154)/(44-78) 101/44  Body mass index is 26.03 kg/m².                  Physical Exam:              General: patient awake, alert and cooperative, oxygen present via nasal cannula, appears chronically ill but stable, no acute distress              Eyes: no scleral icterus              Skin: warm and dry, not jaundiced              Abdomen: soft, normal bowel sounds, nontender, nondistended              Psychiatric: Appropriate affect and behavior                Results Review:                I reviewed the patient's new clinical results.    Results from last 7 days   Lab Units 05/05/24  0725 05/04/24  2337 05/04/24  2214 05/04/24  1322 05/04/24  0554 05/03/24  1148 05/03/24  0621   WBC  "10*3/mm3 8.86  --   --   --  13.90*  --  14.41*   HEMOGLOBIN g/dL 9.3* 9.9* 9.6*   < > 10.2*  10.2*   < > 9.5*   HEMATOCRIT % 28.1* 29.8* 28.9*   < > 30.6*  30.6*   < > 27.9*   PLATELETS 10*3/mm3 211  --   --   --  204  --  201    < > = values in this interval not displayed.     Results from last 7 days   Lab Units 05/05/24  0725 05/04/24  0554 05/03/24  0621 05/02/24  2101   SODIUM mmol/L 141 142 140 138   POTASSIUM mmol/L 3.8 3.3* 4.0 4.3   CHLORIDE mmol/L 107 106 106 102   CO2 mmol/L 26.4 25.1 26.0 24.9   BUN mg/dL 25* 35* 35* 30*   CREATININE mg/dL 0.79 0.83 0.70* 0.79   CALCIUM mg/dL 8.3* 8.2* 8.1* 8.4*   BILIRUBIN mg/dL  --   --   --  0.8   ALK PHOS U/L  --   --   --  70   ALT (SGPT) U/L  --   --   --  17   AST (SGOT) U/L  --   --   --  21   GLUCOSE mg/dL 102* 104* 143* 127*     Results from last 7 days   Lab Units 05/02/24  2101   INR  1.45*     No results found for: \"LIPASE\"    Radiology:  CT Angiogram Chest Pulmonary Embolism   Final Result   The patient has background fibrotic changes. There may be some   increasing consolidation within the left lower lobe when compared to the   prior exam. Some of this may reflect some progression of fibrotic   change, although a superimposed infiltrate is not excluded. There is   also some increasing bronchial wall thickening, particularly within the   lower lobes. Bronchitis is not excluded.       Radiation dose reduction techniques were utilized, including automated   exposure control and exposure modulation based on body size.           This report was finalized on 5/3/2024 12:58 AM by Dr. Kesha Ga M.D on Workstation: [id number]          XR Chest 1 View   Final Result   No acute findings.       This report was finalized on 5/2/2024 9:50 PM by Dr. Kesha Ga M.D on Workstation: [id number]          MRI Brain Without Contrast    (Results Pending)       Assessment & Plan     Active Hospital Problems    Diagnosis     **Atrial fibrillation with " rapid ventricular response     Hematemesis     Melena     PAF (paroxysmal atrial fibrillation)     Paroxysmal atrial flutter     Benign essential hypertension     Mixed hyperlipidemia     Chronic obstructive pulmonary disease        Assessment:  GI bleed with melanic stools and hematemesis, EGD May 4, 2024 with 10 mm superficial ulcer digestive of nonbleeding Brandie-Jose tear with no stigmata of recent bleeding  Questionable cirrhotic changes on CT imaging of the chest but platelet count is normal, liver enzymes and bilirubin are normal  Anticoagulation on Eliquis, currently on hold        Plan:  PPI drip has been continued 24 hours after EGD, will discontinue and start pantoprazole 40 mg twice daily  Recommend repeat EGD in 3 months  GI soft diet    I discussed the patients findings and my recommendations with patient and nursing staff.           Maira ARELLANO  [facility] [place]  [address] [place] [address]

## 2024-05-05 NOTE — PROGRESS NOTES
"DOS: 2024  NAME: Cuauhtemoc Buck   : 1935  PCP: System, Provider Not In  Chief Complaint   Patient presents with    Shortness of Breath       Chief complaint: seizure  Subjective: No recurrent seizure like spells    Objective:  Vital signs: /44   Pulse 112   Temp 97.5 °F (36.4 °C)   Resp 18   Ht 177.8 cm (70\")   Wt 82.3 kg (181 lb 7 oz)   SpO2 96%   BMI 26.03 kg/m²    Gen: NAD, vitals reviewed  MS: oriented x3, recent/remote memory intact, normal attention/concentration, language intact, no neglect.  CN: visual acuity grossly normal, PERRL, EOMI, no facial droop, no dysarthria  Motor: 5/5 throughout upper and lower extremities, normal tone  Sensory: intact to light touch all 4 ext.    Laboratory results:  Lab Results   Component Value Date    GLUCOSE 102 (H) 2024    CALCIUM 8.3 (L) 2024     2024    K 3.8 2024    CO2 26.4 2024     2024    BUN 25 (H) 2024    CREATININE 0.79 2024    EGFRIFAFRI 112 06/10/2021    EGFRIFNONA 78 2021    BCR 31.6 (H) 2024    ANIONGAP 7.6 2024     Lab Results   Component Value Date    WBC 8.86 2024    HGB 9.3 (L) 2024    HCT 28.1 (L) 2024    MCV 92.4 2024     2024     Lab Results   Component Value Date    LDL 58 2021    LDL 50 06/10/2021    LDL 37 10/20/2020            Review of labs: Hgb 9.3    Review and interpretation of imaging: MRI brain pending    Routine EEG showed mild generalized slowing    Diagnoses:  New onset seizure  Paroxysmal atrial fibrillation anticoagulated with Eliquis  Hematemesis    Comment: I suspect he had either a provoked seizure or vasovagal syncope with syncopal convulsion. If MRI is negative no plan to start AED    Plan:  1. Follow up MRI  2. No driving for 90 days per [place] state law    Management discussed with patient.    Addendum: MRI reviewed, no acute abnormality appreciated. Will follow up the report. If no significant " findings noted by radiology we will sign off and see prn.

## 2024-05-05 NOTE — PROGRESS NOTES
[facility]  Cardiology Progress Note    Patient Identification:  Name: Cuauhtemoc Buck  Age: 88 y.o.  Sex: male  :  1935  MRN: [id number]                 Follow Up / Chief Complaint: Shortness of breath    Interval History:       Subjective:  Feeling better    Objective:    Past Medical History:  Past Medical History:   Diagnosis Date    Abnormal CT scan     Acute back pain     Acute sinusitis     Allergic conjunctivitis     Allergic rhinitis     Benign essential hypertension     Bronchitis     COPD (chronic obstructive pulmonary disease)     TRINH (dyspnea on exertion)     Dyspnea     Edema     Emphysema lung     Fatigue     Hyperlipidemia     Hypertension     EDILMA (obstructive sleep apnea)     Pain in joint of left shoulder     Palpitations     Polyp, sigmoid colon     Sleep apnea     SOBOE (shortness of breath on exertion)     Subclinical hypothyroidism     Tachycardia     sudden    Trigger finger      Past Surgical History:  Past Surgical History:   Procedure Laterality Date    COLONOSCOPY N/A 10/28/2016    Procedure: COLONOSCOPY INTO CECUM WITH POLYPECTOMY X 2;  Surgeon: Carli Khanna MD;  Location: [facility] ENDOSCOPY;  Service:     CYST REMOVAL          Social History:   Social History     Tobacco Use    Smoking status: Former     Current packs/day: 0.00     Average packs/day: 1.5 packs/day for 35.0 years (52.5 ttl pk-yrs)     Types: Cigarettes     Start date:      Quit date:      Years since quittin.3    Smokeless tobacco: Never    Tobacco comments:     caffeine use   Substance Use Topics    Alcohol use: Not Currently     Comment: 5-7 beer/liquor drinks a week      Family History:  Family History   Problem Relation Age of Onset    Heart disease Mother     Diabetes Mother     Hypertension Mother     Stroke Mother     Cancer Father     Hypertension Father     Heart disease Brother           Allergies:  No Known Allergies  Scheduled Meds:  atorvastatin, 20 mg,  "Daily  budesonide-formoterol, 2 puff, BID - RT  cefTRIAXone, 2,000 mg, Q24H  dilTIAZem, 30 mg, Q8H  levothyroxine, 50 mcg, Daily  pantoprazole, 40 mg, BID AC  sodium chloride, 10 mL, Q12H  tamsulosin, 0.4 mg, Daily  tiotropium bromide monohydrate, 2 puff, Daily - RT  vitamin B-12, 100 mcg, Daily            INTAKE AND OUTPUT:    Intake/Output Summary (Last 24 hours) at 5/5/2024 1043  Last data filed at 5/5/2024 1025  Gross per 24 hour   Intake 100 ml   Output 2200 ml   Net -2100 ml       Review of Systems:   GI:  Cardiac:  Pulmonary: Shortness of breath better    Constitutional:  Temp:  [97.5 °F (36.4 °C)-98.8 °F (37.1 °C)] 97.5 °F (36.4 °C)  Heart Rate:  [] 101  Resp:  [18-24] 18  BP: (101-154)/(44-78) 101/44    /44   Pulse 101   Temp 97.5 °F (36.4 °C)   Resp 18   Ht 177.8 cm (70\")   Wt 82.3 kg (181 lb 7 oz)   SpO2 94%   BMI 26.03 kg/m²   General appearance: No acute changes   Neck: Trachea midline; NECK, supple, no thyromegaly or lymphadenopathy   Lungs: Normal size and shape, normal breath sounds, equal distribution of air, no rales and rhonchi   CV: S1-S2 regular, no murmurs, no rub, no gallop   Abdomen: Soft, nontender; no masses , no abnormal abdominal sounds   Extremities: No deformity , normal color , no peripheral edema   Skin: Normal temperature, turgor and texture; no rash, ulcers              Cardiographics  Telemetry:     ECG:     Echocardiogram:     Lab Review   Results from last 7 days   Lab Units 05/02/24  2101   HSTROP T ng/L 22*     Results from last 7 days   Lab Units 05/05/24  0725   MAGNESIUM mg/dL 2.2     Results from last 7 days   Lab Units 05/05/24  0725   SODIUM mmol/L 141   POTASSIUM mmol/L 3.8   BUN mg/dL 25*   CREATININE mg/dL 0.79   CALCIUM mg/dL 8.3*     @[email or web address]@  Results from last 7 days   Lab Units 05/05/24  0725 05/04/24  2337 05/04/24  2214 05/04/24  1322 05/04/24  0554 05/03/24  1148 05/03/24  0621   WBC 10*3/mm3 8.86  --   --   --  13.90*  --  14.41* " "  HEMOGLOBIN g/dL 9.3* 9.9* 9.6*   < > 10.2*  10.2*   < > 9.5*   HEMATOCRIT % 28.1* 29.8* 28.9*   < > 30.6*  30.6*   < > 27.9*   PLATELETS 10*3/mm3 211  --   --   --  204  --  201    < > = values in this interval not displayed.     Results from last 7 days   Lab Units 05/02/24  2101   INR  1.45*   APTT seconds 36.8*         Assessment:  Shortness of breath  Atrial fibrillation  Pulmonary fibrosis      Plan:  Vital signs are normal  Hemoglobin down to 9.3  Continue Cardizem  Labs/tests ordered for am      )5/5/2024  Sugar Squires MD      EMR Dragon/Transcription:   \"Dictated utilizing Dragon dictation\".     "

## 2024-05-05 NOTE — PROGRESS NOTES
"                                              LOS: 3 days   Patient Care Team:  System, Provider Not In as PCP - General  Peggy Trejo MD as Consulting Physician (Cardiology)    Chief Complaint:  F/up respiratory failure, COPD and abnormal chest imaging.    Subjective   Interval History    On 2 L oxygen.  Denies dyspnea.  Reported no significant cough.  Underwent EGD yesterday with no complications.    REVIEW OF SYSTEMS:     GASTROINTESTINAL: No BM yet.  Denies abdominal pain.  No nausea or vomiting  CONSTITUTIONAL: No fever or chills.     Ventilator/Non-Invasive Ventilation Settings (From admission, onward)      None                  Physical Exam:     Vital Signs  Temp:  [97.5 °F (36.4 °C)-98.8 °F (37.1 °C)] 97.5 °F (36.4 °C)  Heart Rate:  [] 112  Resp:  [18-24] 18  BP: (101-154)/(44-78) 101/44    Intake/Output Summary (Last 24 hours) at 5/5/2024 0822  Last data filed at 5/5/2024 0543  Gross per 24 hour   Intake 100 ml   Output 2000 ml   Net -1900 ml     Flowsheet Rows      Flowsheet Row First Filed Value   Admission Height 177.8 cm (70\") Documented at 05/02/2024 2055   Admission Weight 81.6 kg (180 lb) Documented at 05/02/2024 2055            PPE used per hospital policy    General Appearance:   Alert, cooperative, in no acute distress   ENMT:  Mallampati score 2, dry mucous membrane   Eyes:  Pupils equal and reactive to light. EOMI   Neck:    Trachea midline. No thyromegaly.   Lungs:   Minimal bibasilar crackles on anterior auscultation.  No wheezing.  No labored breathing    Heart:   irregular rhythm but normal rate.  No audible murmur.   Skin:   No rash or ecchymosis   Abdomen:    Obese. Soft. No tenderness. No HSM.   Neuro/psych:  Conscious, alert, oriented x3. Strength 5/5 in upper and lower  ext.  Appropriate mood and affect   Extremities:  No cyanosis, clubbing or edema.  Warm extremities and well-perfused          Results Review:        Results from last 7 days   Lab Units 05/05/24  0780 " 05/04/24  0554 05/03/24  0621   SODIUM mmol/L 141 142 140   POTASSIUM mmol/L 3.8 3.3* 4.0   CHLORIDE mmol/L 107 106 106   CO2 mmol/L 26.4 25.1 26.0   BUN mg/dL 25* 35* 35*   CREATININE mg/dL 0.79 0.83 0.70*   GLUCOSE mg/dL 102* 104* 143*   CALCIUM mg/dL 8.3* 8.2* 8.1*     Results from last 7 days   Lab Units 05/02/24  2101   HSTROP T ng/L 22*     Results from last 7 days   Lab Units 05/05/24  0725 05/04/24  2337 05/04/24  2214 05/04/24  1322 05/04/24  0554 05/03/24  1148 05/03/24  0621   WBC 10*3/mm3 8.86  --   --   --  13.90*  --  14.41*   HEMOGLOBIN g/dL 9.3* 9.9* 9.6*   < > 10.2*  10.2*   < > 9.5*   HEMATOCRIT % 28.1* 29.8* 28.9*   < > 30.6*  30.6*   < > 27.9*   PLATELETS 10*3/mm3 211  --   --   --  204  --  201    < > = values in this interval not displayed.     Results from last 7 days   Lab Units 05/02/24 2101   INR  1.45*   APTT seconds 36.8*     Results from last 7 days   Lab Units 05/02/24 2101   PROBNP pg/mL 738.0                           I reviewed the patient's new clinical results.        Medication Review:   atorvastatin, 20 mg, Oral, Daily  budesonide-formoterol, 2 puff, Inhalation, BID - RT  cefTRIAXone, 2,000 mg, Intravenous, Q24H  dilTIAZem, 30 mg, Oral, Q8H  levothyroxine, 50 mcg, Oral, Daily  pantoprazole, 80 mg, Intravenous, Once  sodium chloride, 10 mL, Intravenous, Q12H  tamsulosin, 0.4 mg, Oral, Daily  tiotropium bromide monohydrate, 2 puff, Inhalation, Daily - RT  vitamin B-12, 100 mcg, Oral, Daily        pantoprazole, 40 mg, Last Rate: 40 mg (05/05/24 0252)        Diagnostic imaging:  I personally and independently reviewed the following images:  CT chest 5/2/2024: Upper lobe predominant emphysema.  Increased peribronchial thickening.  Bibasilar changes consistent with pulmonary fibrosis and more prominent at the left base.  Present on CT in 2020    Assessment       Acute on chronic hypoxic respiratory failure, on O2 2-3L at home. Follows with Pulm in [place].  COPD, no current  exacerbation  Pulmonary fibrosis he has some bibasilar scarring with some traction bronchiectasis the pattern is not classic UIP pattern I do not see a lot of groundglass type opacities either to suggest an NSIP it is bibasilar this has the appearance of more of a chronic inflammatory process I wonder if this is due to recurrent aspiration.  Is something he will certainly need to follow, I do not any history of significant exposures to explain this or any autoimmune connective tissue type diseases.  EDILMA, on home PAP  Pulmonary hypertension on prior echo, not evident on latest echo 5/3/2024.  10mm superfical esophageal ulcer suggestive of non-bleeding Brandie-Jose tear on EGD 5/4/24    Pertinent medical problems:   Probable generalized seizure.  A-fib with RVR  Upper GI bleed  ABLA        Plan     Maintenance bronchodilators: Symbicort twice daily and Spiriva 2 puffs daily.  Albuterol Neb 4 times a day as needed.  PPI drip  Incentive spirometry  Diuresis with Lasix PRN for CHF      Seems stable from resp perspective. Will sign off. Please call back if needed.  He said he is going back to [place] and therefore he can follow-up with his pulmonologist there.  He could establish care with us if he wishes to do so.    Maunel Cuevas MD  05/05/24  08:22 EDT          This note was dictated utilizing [employer] dictation

## 2024-05-06 ENCOUNTER — TELEPHONE (OUTPATIENT)
Dept: INTERNAL MEDICINE | Facility: CLINIC | Age: 89
End: 2024-05-06
Payer: MEDICARE

## 2024-05-06 LAB
ANION GAP SERPL CALCULATED.3IONS-SCNC: 6.7 MMOL/L (ref 5–15)
BUN SERPL-MCNC: 20 MG/DL (ref 8–23)
BUN/CREAT SERPL: 26.3 (ref 7–25)
CALCIUM SPEC-SCNC: 8.2 MG/DL (ref 8.6–10.5)
CHLORIDE SERPL-SCNC: 105 MMOL/L (ref 98–107)
CO2 SERPL-SCNC: 25.3 MMOL/L (ref 22–29)
CREAT SERPL-MCNC: 0.76 MG/DL (ref 0.76–1.27)
DEPRECATED RDW RBC AUTO: 47.3 FL (ref 37–54)
EGFRCR SERPLBLD CKD-EPI 2021: 87.5 ML/MIN/1.73
ERYTHROCYTE [DISTWIDTH] IN BLOOD BY AUTOMATED COUNT: 13.2 % (ref 12.3–15.4)
GLUCOSE SERPL-MCNC: 98 MG/DL (ref 65–99)
HCT VFR BLD AUTO: 28.3 % (ref 37.5–51)
HGB BLD-MCNC: 8.9 G/DL (ref 13–17.7)
MAGNESIUM SERPL-MCNC: 2.1 MG/DL (ref 1.6–2.4)
MCH RBC QN AUTO: 30.7 PG (ref 26.6–33)
MCHC RBC AUTO-ENTMCNC: 31.4 G/DL (ref 31.5–35.7)
MCV RBC AUTO: 97.6 FL (ref 79–97)
PHOSPHATE SERPL-MCNC: 3.3 MG/DL (ref 2.5–4.5)
PLATELET # BLD AUTO: 243 10*3/MM3 (ref 140–450)
PMV BLD AUTO: 11.5 FL (ref 6–12)
POTASSIUM SERPL-SCNC: 3.5 MMOL/L (ref 3.5–5.2)
RBC # BLD AUTO: 2.9 10*6/MM3 (ref 4.14–5.8)
SODIUM SERPL-SCNC: 137 MMOL/L (ref 136–145)
WBC NRBC COR # BLD AUTO: 7.3 10*3/MM3 (ref 3.4–10.8)

## 2024-05-06 PROCEDURE — 83735 ASSAY OF MAGNESIUM: CPT | Performed by: INTERNAL MEDICINE

## 2024-05-06 PROCEDURE — 85027 COMPLETE CBC AUTOMATED: CPT | Performed by: INTERNAL MEDICINE

## 2024-05-06 PROCEDURE — 99232 SBSQ HOSP IP/OBS MODERATE 35: CPT | Performed by: INTERNAL MEDICINE

## 2024-05-06 PROCEDURE — 97530 THERAPEUTIC ACTIVITIES: CPT

## 2024-05-06 PROCEDURE — 94799 UNLISTED PULMONARY SVC/PX: CPT

## 2024-05-06 PROCEDURE — 25010000002 CEFTRIAXONE PER 250 MG: Performed by: STUDENT IN AN ORGANIZED HEALTH CARE EDUCATION/TRAINING PROGRAM

## 2024-05-06 PROCEDURE — 94760 N-INVAS EAR/PLS OXIMETRY 1: CPT

## 2024-05-06 PROCEDURE — 94761 N-INVAS EAR/PLS OXIMETRY MLT: CPT

## 2024-05-06 PROCEDURE — 97162 PT EVAL MOD COMPLEX 30 MIN: CPT

## 2024-05-06 PROCEDURE — 80048 BASIC METABOLIC PNL TOTAL CA: CPT | Performed by: INTERNAL MEDICINE

## 2024-05-06 PROCEDURE — 94664 DEMO&/EVAL PT USE INHALER: CPT

## 2024-05-06 PROCEDURE — 84100 ASSAY OF PHOSPHORUS: CPT | Performed by: INTERNAL MEDICINE

## 2024-05-06 RX ADMIN — ATORVASTATIN CALCIUM 20 MG: 20 TABLET, FILM COATED ORAL at 08:25

## 2024-05-06 RX ADMIN — BUDESONIDE AND FORMOTEROL FUMARATE DIHYDRATE 2 PUFF: 160; 4.5 AEROSOL RESPIRATORY (INHALATION) at 20:23

## 2024-05-06 RX ADMIN — TIOTROPIUM BROMIDE INHALATION SPRAY 2 PUFF: 3.12 SPRAY, METERED RESPIRATORY (INHALATION) at 07:26

## 2024-05-06 RX ADMIN — TAMSULOSIN HYDROCHLORIDE 0.4 MG: 0.4 CAPSULE ORAL at 08:25

## 2024-05-06 RX ADMIN — DILTIAZEM HYDROCHLORIDE 30 MG: 30 TABLET, FILM COATED ORAL at 14:26

## 2024-05-06 RX ADMIN — SENNOSIDES AND DOCUSATE SODIUM 2 TABLET: 50; 8.6 TABLET ORAL at 08:29

## 2024-05-06 RX ADMIN — PANTOPRAZOLE SODIUM 40 MG: 40 TABLET, DELAYED RELEASE ORAL at 05:03

## 2024-05-06 RX ADMIN — DILTIAZEM HYDROCHLORIDE 30 MG: 30 TABLET, FILM COATED ORAL at 20:04

## 2024-05-06 RX ADMIN — Medication 10 ML: at 08:26

## 2024-05-06 RX ADMIN — Medication 100 MCG: at 08:25

## 2024-05-06 RX ADMIN — POLYETHYLENE GLYCOL 3350 17 G: 17 POWDER, FOR SOLUTION ORAL at 14:30

## 2024-05-06 RX ADMIN — DILTIAZEM HYDROCHLORIDE 30 MG: 30 TABLET, FILM COATED ORAL at 05:03

## 2024-05-06 RX ADMIN — Medication 10 ML: at 20:05

## 2024-05-06 RX ADMIN — CEFTRIAXONE 2000 MG: 2 INJECTION, POWDER, FOR SOLUTION INTRAMUSCULAR; INTRAVENOUS at 08:25

## 2024-05-06 RX ADMIN — BISACODYL 5 MG: 5 TABLET, COATED ORAL at 20:04

## 2024-05-06 RX ADMIN — LEVOTHYROXINE SODIUM 50 MCG: 50 TABLET ORAL at 05:03

## 2024-05-06 RX ADMIN — BUDESONIDE AND FORMOTEROL FUMARATE DIHYDRATE 2 PUFF: 160; 4.5 AEROSOL RESPIRATORY (INHALATION) at 07:26

## 2024-05-06 RX ADMIN — PANTOPRAZOLE SODIUM 40 MG: 40 TABLET, DELAYED RELEASE ORAL at 17:20

## 2024-05-06 NOTE — PROGRESS NOTES
Name: Cuauhtemoc Buck ADMIT: 2024   : 1935  PCP: System, Provider Not In    MRN: [id number] LOS: 4 days   AGE/SEX: 88 y.o. male  ROOM: [facility]     Subjective   Subjective   Chief Complaint   Patient presents with    Shortness of Breath     Reported he did okay overnight.  Was not having any ongoing nausea vomiting or abdominal pain.  No chest pain or shortness of breath reported.     Objective   Objective   Vital Signs  Temp:  [97.5 °F (36.4 °C)-97.9 °F (36.6 °C)] 97.5 °F (36.4 °C)  Heart Rate:  [] 83  Resp:  [18] 18  BP: ()/(67-73) 103/69  SpO2:  [97 %-99 %] 99 %  on  Flow (L/min):  [2] 2;   Device (Oxygen Therapy): nasal cannula  Body mass index is 26.03 kg/m².    Physical Exam  Vitals and nursing note reviewed.   Constitutional:       General: He is not in acute distress.     Appearance: He is not diaphoretic.   HENT:      Head: Atraumatic.   Cardiovascular:      Rate and Rhythm: Normal rate. Rhythm irregular.      Pulses: Normal pulses.   Pulmonary:      Effort: Pulmonary effort is normal.      Breath sounds: No wheezing or rhonchi.   Abdominal:      General: There is no distension.      Palpations: Abdomen is soft.      Tenderness: There is no abdominal tenderness. There is no guarding or rebound.   Musculoskeletal:         General: No tenderness.      Right lower leg: No edema.      Left lower leg: No edema.   Skin:     General: Skin is warm and dry.   Neurological:      Mental Status: He is alert. Mental status is at baseline.   Psychiatric:         Mood and Affect: Mood normal.         Behavior: Behavior normal.       Results Review  I reviewed the patient's new clinical results.    Results from last 7 days   Lab Units 24  0529 24  2302 24  1552 24  0725 24  1322 24  0554 24  1148 24  0621   WBC 10*3/mm3 7.30  --   --  8.86  --  13.90*  --  14.41*   HEMOGLOBIN g/dL 8.9* 8.8* 8.8* 9.3*   < > 10.2*  10.2*   < > 9.5*   PLATELETS 10*3/mm3  "243  --   --  211  --  204  --  201    < > = values in this interval not displayed.     Results from last 7 days   Lab Units 05/06/24  0529 05/05/24 0725 05/04/24  0554 05/03/24  0621   SODIUM mmol/L 137 141 142 140   POTASSIUM mmol/L 3.5 3.8 3.3* 4.0   CHLORIDE mmol/L 105 107 106 106   CO2 mmol/L 25.3 26.4 25.1 26.0   BUN mg/dL 20 25* 35* 35*   CREATININE mg/dL 0.76 0.79 0.83 0.70*   GLUCOSE mg/dL 98 102* 104* 143*   EGFR mL/min/1.73 87.5 86.5 85.2 89.7     Results from last 7 days   Lab Units 05/02/24  2101   ALBUMIN g/dL 3.1*   BILIRUBIN mg/dL 0.8   ALK PHOS U/L 70   AST (SGOT) U/L 21   ALT (SGPT) U/L 17     Results from last 7 days   Lab Units 05/06/24 0529 05/05/24 0725 05/04/24  0554 05/03/24  0621 05/02/24  2101   CALCIUM mg/dL 8.2* 8.3* 8.2* 8.1* 8.4*   ALBUMIN g/dL  --   --   --   --  3.1*   MAGNESIUM mg/dL 2.1 2.2 2.2 2.1  --    PHOSPHORUS mg/dL 3.3 2.6 2.7 3.4  --      Results from last 7 days   Lab Units 05/03/24  0621   PROCALCITONIN ng/mL <0.02     No results found for: \"HGBA1C\", \"POCGLU\"    MRI Brain Without Contrast    Result Date: 5/5/2024  The study is hampered somewhat by patient motion but there is no evidence of acute infarction. Areas of T2 FLAIR hyperintensity are appreciated involving the white matter of the cerebral hemispheres bilaterally, nonspecific but consistent with mild small vessel ischemic disease. There is no evidence of mass or mass effect on this noncontrasted MRI examination of the brain.  This report was finalized on 5/5/2024 8:46 PM by Dr. Dheeraj Kaplan M.D on Workstation: [id number]       I have personally reviewed all medications:  Scheduled Medications  atorvastatin, 20 mg, Oral, Daily  budesonide-formoterol, 2 puff, Inhalation, BID - RT  cefTRIAXone, 2,000 mg, Intravenous, Q24H  dilTIAZem, 30 mg, Oral, Q8H  levothyroxine, 50 mcg, Oral, Daily  pantoprazole, 40 mg, Oral, BID AC  sodium chloride, 10 mL, Intravenous, Q12H  tamsulosin, 0.4 mg, Oral, Daily  tiotropium " bromide monohydrate, 2 puff, Inhalation, Daily - RT  vitamin B-12, 100 mcg, Oral, Daily      Infusions     Diet  Diet: Regular/House, Gastrointestinal; Low Irritant; Texture: Soft to Chew (NDD 3); Soft to Chew: Whole Meat; Fluid Consistency: Thin (IDDSI 0)    I have personally reviewed:  [x]  Laboratory   []  Microbiology   [x]  Radiology   [x]  EKG/Telemetry  [x]  Cardiology/Vascular   []  Pathology    []  Records       Assessment/Plan     Active Hospital Problems    Diagnosis  POA    **Atrial fibrillation with rapid ventricular response [I48.91]  Yes    Hematemesis [K92.0]  Yes    Melena [K92.1]  Unknown    PAF (paroxysmal atrial fibrillation) [I48.0]  Yes    Paroxysmal atrial flutter [I48.92]  Yes    Benign essential hypertension [I10]  Yes    Mixed hyperlipidemia [E78.2]  Yes    Chronic obstructive pulmonary disease [J44.9]  Yes      Resolved Hospital Problems   No resolved problems to display.       88 y.o. male admitted with Atrial fibrillation with rapid ventricular response.    GI bleed: Status post EGD with superficial nonbleeding Brandie-Jose tear.  PPI.  Eliquis held.  Resume when okay with GI.  GI following, plan repeat EGD in 3 months.  A-fib with RVR: Rate okay.  Resume AC when okay with GI.  Cardiology following.  COPD/fibrotic change: CT showed potential progression of fibrotic changes and infiltrate could not be excluded.  Procalcitonin was low.  Patient is not having fever and WBC is okay now.  He has completed a short antibiotic course.  RVP negative.  Hypertension: Not grossly elevated.  Monitoring.  HLD: Statin  Hypothyroidism: Synthroid  PPX: SCD until able to resume chronic anticoagulation  Disposition: Home health versus SNF/few days    Expected Discharge Date: 5/7/2024; Expected Discharge Time:      Rocky Lucio MD  [facility]  05/06/24  14:29 EDT    Dictated portions of note using Dragon dictation software.  Copied text in this note has been reviewed by me and  remains accurate as of 05/06/24

## 2024-05-06 NOTE — PLAN OF CARE
Goal Outcome Evaluation:           Progress: improving  Outcome Evaluation: Patient alert and oriented on 2L NC. urinal and brief assist X1. HGB 8.8. VSS. seizure precautions.

## 2024-05-06 NOTE — PLAN OF CARE
Goal Outcome Evaluation:  Plan of Care Reviewed With: patient        Progress: improving  Outcome Evaluation: Rocephin administered per order.  Patient  on 2L NC and is baseline.  No s/s of bleeding.  Patient is x1 assistance with activity.  VS and labs monitored.

## 2024-05-06 NOTE — PLAN OF CARE
Goal Outcome Evaluation:  Plan of Care Reviewed With: patient           Outcome Evaluation: Pt. is an 88 year old Male admitted to the hospital with TARIK Amaya (with RVR).  Pt. reports that prior to admission he was independent with functional mobility and used no A.D. during ambulation.  Pt. currently presents with decreased strength, decreased balance, and decreased tolerance to functional activity.  This AM, pt. able to ambulate 40 feet, CGA x 1, with no use of A.D.  Pt. requires CGA x 1 for bed mobility and CGA x 1 for sit <-> stand transfers. Verbal/tactile cues given for posture correction during ambulation. Ambulation distance limited due to overall fatigue and mild SOA.  Pt. will benefit from skilled inpt. P.T. to address his functional deficits and to assist pt. in regaining his maximum level of independence with functional mobility.      Anticipated Discharge Disposition (PT): home with assist, home with home health, skilled nursing facility (All pending pt. progress;  Pt. lives alone per his report)

## 2024-05-06 NOTE — PROGRESS NOTES
[facility]  Inpatient Progress Note    Reason for Follow Up: Upper GI bleed secondary to Brandie-Jose tear    Subjective     Interval History:   EGD May 4, 2024 with 10 mm superficial ulcer digestive of nonbleeding Brandie-Jose tear with no stigmata of recent bleeding     He denies dysphagia or odynophagia.  No heartburn.  No blood in the stool    Current Facility-Administered Medications:     acetaminophen (TYLENOL) tablet 650 mg, 650 mg, Oral, Q4H PRN **OR** acetaminophen (TYLENOL) 160 MG/5ML oral solution 650 mg, 650 mg, Oral, Q4H PRN **OR** acetaminophen (TYLENOL) suppository 650 mg, 650 mg, Rectal, Q4H PRN, Paul Manning MD    atorvastatin (LIPITOR) tablet 20 mg, 20 mg, Oral, Daily, Paul Manning MD, 20 mg at 05/06/24 0825    sennosides-docusate (PERICOLACE) 8.6-50 MG per tablet 2 tablet, 2 tablet, Oral, BID PRN, 2 tablet at 05/06/24 0829 **AND** polyethylene glycol (MIRALAX) packet 17 g, 17 g, Oral, Daily PRN, 17 g at 05/05/24 2012 **AND** bisacodyl (DULCOLAX) EC tablet 5 mg, 5 mg, Oral, Daily PRN **AND** bisacodyl (DULCOLAX) suppository 10 mg, 10 mg, Rectal, Daily PRN, Paul Manning MD    budesonide-formoterol (SYMBICORT) 160-4.5 MCG/ACT inhaler 2 puff, 2 puff, Inhalation, BID - RT, Paul Manning MD, 2 puff at 05/06/24 0726    cefTRIAXone (ROCEPHIN) 2,000 mg in sodium chloride 0.9 % 100 mL MBP, 2,000 mg, Intravenous, Q24H, Anh Costa MD, Last Rate: 200 mL/hr at 05/06/24 0825, 2,000 mg at 05/06/24 0825    dilTIAZem (CARDIZEM) tablet 30 mg, 30 mg, Oral, Q8H, Paul Manning MD, 30 mg at 05/06/24 0503    ipratropium-albuterol (DUO-NEB) nebulizer solution 3 mL, 3 mL, Nebulization, 4x Daily PRN, Paul Manning MD    levothyroxine (SYNTHROID, LEVOTHROID) tablet 50 mcg, 50 mcg, Oral, Daily, Paul Manning MD, 50 mcg at 05/06/24 0503    nitroglycerin (NITROSTAT) SL tablet 0.4 mg, 0.4 mg, Sublingual, Q5 Min PRN, Nigel  Paul Posadas MD    pantoprazole (PROTONIX) EC tablet 40 mg, 40 mg, Oral, BID AC, Anh Costa MD, 40 mg at 05/06/24 0503    sodium chloride 0.9 % flush 10 mL, 10 mL, Intravenous, PRN, Paul Manning MD    sodium chloride 0.9 % flush 10 mL, 10 mL, Intravenous, Q12H, Paul Manning MD, 10 mL at 05/06/24 0826    sodium chloride 0.9 % flush 10 mL, 10 mL, Intravenous, PRN, Paul Manning MD    sodium chloride 0.9 % infusion 40 mL, 40 mL, Intravenous, PRN, Paul Maninng MD    tamsulosin (FLOMAX) 24 hr capsule 0.4 mg, 0.4 mg, Oral, Daily, Paul Manning MD, 0.4 mg at 05/06/24 0825    tiotropium (SPIRIVA RESPIMAT) 2.5 mcg/act aerosol solution inhaler, 2 puff, Inhalation, Daily - RT, Paul Manning MD, 2 puff at 05/06/24 0726    vitamin B-12 (CYANOCOBALAMIN) tablet 100 mcg, 100 mcg, Oral, Daily, Paul Manning MD, 100 mcg at 05/06/24 0825  Review of Systems:    The following systems were reviewed and negative;  constitution and gastrointestinal    Objective     Vital Signs  Temp:  [97.5 °F (36.4 °C)-97.9 °F (36.6 °C)] 97.9 °F (36.6 °C)  Heart Rate:  [] 83  Resp:  [18] 18  BP: ()/(65-73) 97/73  Body mass index is 26.03 kg/m².    Intake/Output Summary (Last 24 hours) at 5/6/2024 0842  Last data filed at 5/6/2024 0747  Gross per 24 hour   Intake 120 ml   Output 1050 ml   Net -930 ml     I/O this shift:  In: -   Out: 100 [Urine:100]     Physical Exam:   General: patient awake, alert and cooperative   Eyes: Normal lids and lashes, no scleral icterus   Neck: supple, normal ROM   Skin: warm and dry, not jaundiced   Cardiovascular: regular rhythm and rate, no murmurs auscultated   Pulm: clear to auscultation bilaterally, regular and unlabored   Abdomen: soft, nontender, nondistended; normal bowel sounds   Extremities: no rash or edema   Psychiatric: Normal mood and behavior; memory intact     Results Review:     I reviewed the patient's new clinical  "results.    Results from last 7 days   Lab Units 05/06/24  0529 05/05/24  2302 05/05/24  1552 05/05/24  0725 05/04/24  1322 05/04/24  0554   WBC 10*3/mm3 7.30  --   --  8.86  --  13.90*   HEMOGLOBIN g/dL 8.9* 8.8* 8.8* 9.3*   < > 10.2*  10.2*   HEMATOCRIT % 28.3* 27.4* 26.8* 28.1*   < > 30.6*  30.6*   PLATELETS 10*3/mm3 243  --   --  211  --  204    < > = values in this interval not displayed.     Results from last 7 days   Lab Units 05/06/24 0529 05/05/24 0725 05/04/24  0554 05/03/24 0621 05/02/24 2101   SODIUM mmol/L 137 141 142   < > 138   POTASSIUM mmol/L 3.5 3.8 3.3*   < > 4.3   CHLORIDE mmol/L 105 107 106   < > 102   CO2 mmol/L 25.3 26.4 25.1   < > 24.9   BUN mg/dL 20 25* 35*   < > 30*   CREATININE mg/dL 0.76 0.79 0.83   < > 0.79   CALCIUM mg/dL 8.2* 8.3* 8.2*   < > 8.4*   BILIRUBIN mg/dL  --   --   --   --  0.8   ALK PHOS U/L  --   --   --   --  70   ALT (SGPT) U/L  --   --   --   --  17   AST (SGOT) U/L  --   --   --   --  21   GLUCOSE mg/dL 98 102* 104*   < > 127*    < > = values in this interval not displayed.     Results from last 7 days   Lab Units 05/02/24  2101   INR  1.45*     No results found for: \"LIPASE\"    Radiology:  MRI Brain Without Contrast   Final Result   The study is hampered somewhat by patient motion but there   is no evidence of acute infarction. Areas of T2 FLAIR hyperintensity are   appreciated involving the white matter of the cerebral hemispheres   bilaterally, nonspecific but consistent with mild small vessel ischemic   disease. There is no evidence of mass or mass effect on this   noncontrasted MRI examination of the brain.       This report was finalized on 5/5/2024 8:46 PM by Dr. Dheeraj Kaplan M.D   on Workstation: [id number]          CT Angiogram Chest Pulmonary Embolism   Final Result   The patient has background fibrotic changes. There may be some   increasing consolidation within the left lower lobe when compared to the   prior exam. Some of this may reflect some " progression of fibrotic   change, although a superimposed infiltrate is not excluded. There is   also some increasing bronchial wall thickening, particularly within the   lower lobes. Bronchitis is not excluded.       Radiation dose reduction techniques were utilized, including automated   exposure control and exposure modulation based on body size.           This report was finalized on 5/3/2024 12:58 AM by Dr. Kesha Ga M.D on Workstation: [id number]          XR Chest 1 View   Final Result   No acute findings.       This report was finalized on 5/2/2024 9:50 PM by Dr. Kesha Ga M.D on Workstation: [id number]              Assessment & Plan     Active Hospital Problems    Diagnosis     **Atrial fibrillation with rapid ventricular response     Hematemesis     Melena     PAF (paroxysmal atrial fibrillation)     Paroxysmal atrial flutter     Benign essential hypertension     Mixed hyperlipidemia     Chronic obstructive pulmonary disease      All problems are new to me today  Assessment:  GI bleed with melanic stools and hematemesis, EGD May 4, 2024 with 10 mm superficial ulcer digestive of nonbleeding Brandie-Jose tear with no stigmata of recent bleeding  Questionable cirrhotic changes on CT imaging of the chest but platelet count is normal, liver enzymes and bilirubin are normal  Anticoagulation on Eliquis, currently on hold      Plan:  Hemoglobin stable-can DC serial H&H  Continue twice daily PPI x 8 weeks  Okay to resume Eliquis 5/11  Repeat outpatient EGD with local gastroenterologist in 3 months to ensure healing ideally  No further recommendations at this time-we will sign off but are available as needed    I discussed the patients findings and my recommendations with patient.            Sonia Fonseca M.D.  [facility]  [phone]

## 2024-05-06 NOTE — THERAPY EVALUATION
Patient Name: Cuauhtemoc Buck  : 1935    MRN: [id number]                              Today's Date: 2024       Admit Date: 2024    Visit Dx:     ICD-10-CM ICD-9-CM   1. Acute on chronic respiratory failure with hypoxia  J96.21 518.84     799.02   2. Atrial fibrillation with rapid ventricular response  I48.91 427.31   3. Acute congestive heart failure, unspecified heart failure type  I50.9 428.0   4. Chronic anticoagulation  Z79.01 V58.61     Patient Active Problem List   Diagnosis    Benign essential hypertension    Chronic obstructive pulmonary disease    Mixed hyperlipidemia    Shoulder joint pain    Subclinical hypothyroidism    Exertional dyspnea    Paroxysmal atrial flutter    PAF (paroxysmal atrial fibrillation)    PVC (premature ventricular contraction)    Atrial fibrillation with rapid ventricular response    Hematemesis    Melena     Past Medical History:   Diagnosis Date    Abnormal CT scan     Acute back pain     Acute sinusitis     Allergic conjunctivitis     Allergic rhinitis     Benign essential hypertension     Bronchitis     COPD (chronic obstructive pulmonary disease)     TRINH (dyspnea on exertion)     Dyspnea     Edema     Emphysema lung     Fatigue     Hyperlipidemia     Hypertension     EDILMA (obstructive sleep apnea)     Pain in joint of left shoulder     Palpitations     Polyp, sigmoid colon     Sleep apnea     SOBOE (shortness of breath on exertion)     Subclinical hypothyroidism     Tachycardia     sudden    Trigger finger      Past Surgical History:   Procedure Laterality Date    COLONOSCOPY N/A 10/28/2016    Procedure: COLONOSCOPY INTO CECUM WITH POLYPECTOMY X 2;  Surgeon: Carli Khanna MD;  Location: [facility] ENDOSCOPY;  Service:     CYST REMOVAL  1960    ENDOSCOPY N/A 2024    Procedure: ESOPHAGOGASTRODUODENOSCOPY;  Surgeon: Paul Manning MD;  Location: [facility] ENDOSCOPY;  Service: Gastroenterology;  Laterality: N/A;  ni-merino tear      General Information        Row Name 05/06/24 1137          Physical Therapy Time and Intention    Document Type evaluation  Pt. admitted with A. Fib. (with RVR)  -MS     Mode of Treatment physical therapy;individual therapy  -MS       Row Name 05/06/24 1137          General Information    Patient Profile Reviewed yes  -MS     Prior Level of Function independent:  No use of A.D. this date.  -MS     Existing Precautions/Restrictions oxygen therapy device and L/min;fall   Exit alarm  -MS     Barriers to Rehab none identified  -MS       Row Name 05/06/24 1137          Cognition    Orientation Status (Cognition) oriented x 3  -MS       Row Name 05/06/24 1137          Safety Issues, Functional Mobility    Comment, Safety Issues/Impairments (Mobility) Gait belt used for safety.  -MS               User Key  (r) = Recorded By, (t) = Taken By, (c) = Cosigned By      Initials Name Provider Type    Cole Newell, PT Physical Therapist                   Mobility       Row Name 05/06/24 1138          Bed Mobility    Bed Mobility supine-sit;sit-supine  -MS     Supine-Sit Gray (Bed Mobility) contact guard  -MS     Sit-Supine Gray (Bed Mobility) contact guard  -MS       Row Name 05/06/24 1138          Sit-Stand Transfer    Sit-Stand Gray (Transfers) contact guard  -MS       Row Name 05/06/24 1138          Gait/Stairs (Locomotion)    Gray Level (Gait) contact guard  -MS     Distance in Feet (Gait) 40  -MS     Deviations/Abnormal Patterns (Gait) sarah decreased  -MS     Bilateral Gait Deviations forward flexed posture  -MS     Comment, (Gait/Stairs) Verbal/tactile cues given for posture correction.  Limited in gait distance due to mild SOA with upright activity.  -MS               User Key  (r) = Recorded By, (t) = Taken By, (c) = Cosigned By      Initials Name Provider Type    Cole Newell, PT Physical Therapist                   Obj/Interventions       Row Name 05/06/24 1139          Range of Motion  Comprehensive    Comment, General Range of Motion BUE/LE (WFL's)  -MS       Row Name 05/06/24 1139          Strength Comprehensive (MMT)    Comment, General Manual Muscle Testing (MMT) Assessment BUE/LE (3+/5)  -MS               User Key  (r) = Recorded By, (t) = Taken By, (c) = Cosigned By      Initials Name Provider Type    Miky Newellhayden GONCALVES, PT Physical Therapist                   Goals/Plan       Row Name 05/06/24 1140          Bed Mobility Goal 1 (PT)    Activity/Assistive Device (Bed Mobility Goal 1, PT) bed mobility activities, all  -MS     Gonzales Level/Cues Needed (Bed Mobility Goal 1, PT) standby assist  -MS     Time Frame (Bed Mobility Goal 1, PT) long term goal (LTG);1 week  -MS       Row Name 05/06/24 1140          Transfer Goal 1 (PT)    Activity/Assistive Device (Transfer Goal 1, PT) transfers, all  -MS     Gonzales Level/Cues Needed (Transfer Goal 1, PT) standby assist  -MS     Time Frame (Transfer Goal 1, PT) long term goal (LTG);1 week  -MS       Row Name 05/06/24 1140          Gait Training Goal 1 (PT)    Activity/Assistive Device (Gait Training Goal 1, PT) gait (walking locomotion)  -MS     Gonzales Level (Gait Training Goal 1, PT) standby assist  -MS     Distance (Gait Training Goal 1, PT) 150 feet  -MS     Time Frame (Gait Training Goal 1, PT) long term goal (LTG);1 week  -MS       Row Name 05/06/24 1140          Therapy Assessment/Plan (PT)    Planned Therapy Interventions (PT) balance training;bed mobility training;gait training;home exercise program;patient/family education;postural re-education;transfer training;strengthening  -MS               User Key  (r) = Recorded By, (t) = Taken By, (c) = Cosigned By      Initials Name Provider Type    MS StatonCole, PT Physical Therapist                   Clinical Impression       Row Name 05/06/24 1139          Pain    Pretreatment Pain Rating 0/10 - no pain  -MS     Posttreatment Pain Rating 0/10 - no pain  -MS       Row Name  05/06/24 1139          Plan of Care Review    Plan of Care Reviewed With patient  -MS       Row Name 05/06/24 1139          Therapy Assessment/Plan (PT)    Rehab Potential (PT) good, to achieve stated therapy goals  -MS     Criteria for Skilled Interventions Met (PT) skilled treatment is necessary  -MS     Therapy Frequency (PT) 6 times/wk  -MS       Row Name 05/06/24 1139          Positioning and Restraints    Pre-Treatment Position in bed  -MS     Post Treatment Position bed  -MS     In Bed notified nsg;supine;call light within reach;encouraged to call for assist;exit alarm on  All lines intact.  -MS               User Key  (r) = Recorded By, (t) = Taken By, (c) = Cosigned By      Initials Name Provider Type    Cole Newell, PT Physical Therapist                   Outcome Measures       Row Name 05/06/24 1140 05/06/24 0825       How much help from another person do you currently need...    Turning from your back to your side while in flat bed without using bedrails? 3  -MS 4  -SH    Moving from lying on back to sitting on the side of a flat bed without bedrails? 3  -MS 4  -SH    Moving to and from a bed to a chair (including a wheelchair)? 3  -MS 4  -SH    Standing up from a chair using your arms (e.g., wheelchair, bedside chair)? 3  -MS 4  -SH    Climbing 3-5 steps with a railing? 3  -MS 3  -SH    To walk in hospital room? 3  -MS 3  -SH    AM-PAC 6 Clicks Score (PT) 18  -MS 22  -SH    Highest Level of Mobility Goal 6 --> Walk 10 steps or more  -MS 7 --> Walk 25 feet or more  -SH      Row Name 05/06/24 1140          Functional Assessment    Outcome Measure Options AM-PAC 6 Clicks Basic Mobility (PT)  -MS               User Key  (r) = Recorded By, (t) = Taken By, (c) = Cosigned By      Initials Name Provider Type    Cole Newell, PT Physical Therapist    Kimberley Abraham, RN Registered Nurse                                 Physical Therapy Education       Title: PT OT SLP Therapies (Done)        Topic: Physical Therapy (Done)       Point: Mobility training (Done)       Learning Progress Summary             Patient Acceptance, E,D, VU,NR by MS at 5/6/2024 1140                         Point: Home exercise program (Done)       Learning Progress Summary             Patient Acceptance, E,D, VU,NR by MS at 5/6/2024 1140                         Point: Body mechanics (Done)       Learning Progress Summary             Patient Acceptance, E,D, VU,NR by MS at 5/6/2024 1140                         Point: Precautions (Done)       Learning Progress Summary             Patient Acceptance, E,D, VU,NR by MS at 5/6/2024 1140                                         User Key       Initials Effective Dates Name Provider Type Discipline    MS 06/16/21 -  Cole Staton, PT Physical Therapist PT                  PT Recommendation and Plan  Planned Therapy Interventions (PT): balance training, bed mobility training, gait training, home exercise program, patient/family education, postural re-education, transfer training, strengthening  Plan of Care Reviewed With: patient  Outcome Evaluation: Pt. is an 88 year old Male admitted to the hospital with A. Fib. (with RVR).  Pt. reports that prior to admission he was independent with functional mobility and used no A.D. during ambulation.  Pt. currently presents with decreased strength, decreased balance, and decreased tolerance to functional activity.  This AM, pt. able to ambulate 40 feet, CGA x 1, with no use of A.D.  Pt. requires CGA x 1 for bed mobility and CGA x 1 for sit <-> stand transfers. Verbal/tactile cues given for posture correction during ambulation. Ambulation distance limited due to overall fatigue and mild SOA.  Pt. will benefit from skilled inpt. P.T. to address his functional deficits and to assist pt. in regaining his maximum level of independence with functional mobility.     Time Calculation:         PT Charges       Row Name 05/06/24 1144             Time  Calculation    Start Time 1040  -MS      Stop Time 1053  -MS      Time Calculation (min) 13 min  -MS      PT Received On 05/06/24  -MS      PT - Next Appointment 05/07/24  -MS      PT Goal Re-Cert Due Date 05/13/24  -MS         Time Calculation- PT    Total Timed Code Minutes- PT 12 minute(s)  -MS                User Key  (r) = Recorded By, (t) = Taken By, (c) = Cosigned By      Initials Name Provider Type    Cole Newell, PT Physical Therapist                  Therapy Charges for Today       Code Description Service Date Service Provider Modifiers Qty    [id number]  PT EVAL MOD COMPLEXITY 2 5/6/2024 Cole Staton, PT GP 1    [id number]  PT THERAPEUTIC ACT EA 15 MIN 5/6/2024 Cole Staton, PT GP 1            PT G-Codes  Outcome Measure Options: AM-PAC 6 Clicks Basic Mobility (PT)  AM-PAC 6 Clicks Score (PT): 18  PT Discharge Summary  Anticipated Discharge Disposition (PT): home with home health, skilled nursing facility, home with assist (Pending pt. progress as pt. lives alone per his report)    Cole Staton, PT  5/6/2024

## 2024-05-06 NOTE — PROGRESS NOTES
"Patient Name: Cuauhtemoc Buck  :1935  88 y.o.      Patient Care Team:  System, Provider Not In as PCP - General  Peggy Trejo MD as Consulting Physician (Cardiology)    Interval History:   No CP or SOA    Subjective:  Following for a fib     Objective   Vital Signs  Temp:  [97.5 °F (36.4 °C)-97.9 °F (36.6 °C)] 97.9 °F (36.6 °C)  Heart Rate:  [] 83  Resp:  [18] 18  BP: ()/(65-73) 97/73    Intake/Output Summary (Last 24 hours) at 2024 1233  Last data filed at 2024 0825  Gross per 24 hour   Intake 360 ml   Output 850 ml   Net -490 ml     Flowsheet Rows      Flowsheet Row First Filed Value   Admission Height 177.8 cm (70\") Documented at 2024   Admission Weight 81.6 kg (180 lb) Documented at 2024            Physical Exam:   General Appearance:    Alert, cooperative, in no acute distress   Lungs:     Clear to auscultation.  Normal respiratory effort and rate.      Heart:    Regular rhythm and normal rate, normal S1 and S2, no murmurs, gallops or rubs.     Chest Wall:    No abnormalities observed   Abdomen:     Soft, nontender, positive bowel sounds.     Extremities:   no cyanosis, clubbing or edema.  No marked joint deformities.  Adequate musculoskeletal strength.       Results Review:    Results from last 7 days   Lab Units 24  0529   SODIUM mmol/L 137   POTASSIUM mmol/L 3.5   CHLORIDE mmol/L 105   CO2 mmol/L 25.3   BUN mg/dL 20   CREATININE mg/dL 0.76   GLUCOSE mg/dL 98   CALCIUM mg/dL 8.2*     Results from last 7 days   Lab Units 24  2101   HSTROP T ng/L 22*     Results from last 7 days   Lab Units 24  0529   WBC 10*3/mm3 7.30   HEMOGLOBIN g/dL 8.9*   HEMATOCRIT % 28.3*   PLATELETS 10*3/mm3 243     Results from last 7 days   Lab Units 24  2101   INR  1.45*   APTT seconds 36.8*         Results from last 7 days   Lab Units 24  0529   MAGNESIUM mg/dL 2.1             Medication Review:   atorvastatin, 20 mg, Oral, " Daily  budesonide-formoterol, 2 puff, Inhalation, BID - RT  cefTRIAXone, 2,000 mg, Intravenous, Q24H  dilTIAZem, 30 mg, Oral, Q8H  levothyroxine, 50 mcg, Oral, Daily  pantoprazole, 40 mg, Oral, BID AC  sodium chloride, 10 mL, Intravenous, Q12H  tamsulosin, 0.4 mg, Oral, Daily  tiotropium bromide monohydrate, 2 puff, Inhalation, Daily - RT  vitamin B-12, 100 mcg, Oral, Daily              Assessment & Plan     1.  Esophageal ulcer/nonbleeding Brandie-Jose tear.  Status post EGD.  2. Paroxysmal atrial fibrillation/flutter.  Eliquis on hold due to #1.  Resume when okay with GI.  3.  PVCs  4.  Hypertension   5.  hyperlipidemia  6.  COPD/pulmonary fibrosis    Peggy Trejo MD, [facility]  05/06/24  12:33 EDT

## 2024-05-07 ENCOUNTER — READMISSION MANAGEMENT (OUTPATIENT)
Dept: CALL CENTER | Facility: HOSPITAL | Age: 89
End: 2024-05-07
Payer: MEDICARE

## 2024-05-07 VITALS
BODY MASS INDEX: 25.25 KG/M2 | DIASTOLIC BLOOD PRESSURE: 68 MMHG | WEIGHT: 176.4 LBS | TEMPERATURE: 97.9 F | OXYGEN SATURATION: 92 % | HEIGHT: 70 IN | SYSTOLIC BLOOD PRESSURE: 105 MMHG | RESPIRATION RATE: 18 BRPM | HEART RATE: 108 BPM

## 2024-05-07 LAB
ANION GAP SERPL CALCULATED.3IONS-SCNC: 5 MMOL/L (ref 5–15)
BH BB BLOOD EXPIRATION DATE: NORMAL
BH BB BLOOD TYPE BARCODE: 6200
BH BB DISPENSE STATUS: NORMAL
BH BB PRODUCT CODE: NORMAL
BH BB UNIT NUMBER: NORMAL
BUN SERPL-MCNC: 14 MG/DL (ref 8–23)
BUN/CREAT SERPL: 17.9 (ref 7–25)
CALCIUM SPEC-SCNC: 8.2 MG/DL (ref 8.6–10.5)
CHLORIDE SERPL-SCNC: 110 MMOL/L (ref 98–107)
CO2 SERPL-SCNC: 25 MMOL/L (ref 22–29)
CREAT SERPL-MCNC: 0.78 MG/DL (ref 0.76–1.27)
CROSSMATCH INTERPRETATION: NORMAL
DEPRECATED RDW RBC AUTO: 45.6 FL (ref 37–54)
EGFRCR SERPLBLD CKD-EPI 2021: 86.9 ML/MIN/1.73
ERYTHROCYTE [DISTWIDTH] IN BLOOD BY AUTOMATED COUNT: 13.2 % (ref 12.3–15.4)
GLUCOSE SERPL-MCNC: 94 MG/DL (ref 65–99)
HCT VFR BLD AUTO: 26.8 % (ref 37.5–51)
HGB BLD-MCNC: 8.9 G/DL (ref 13–17.7)
MAGNESIUM SERPL-MCNC: 2.2 MG/DL (ref 1.6–2.4)
MCH RBC QN AUTO: 32 PG (ref 26.6–33)
MCHC RBC AUTO-ENTMCNC: 33.2 G/DL (ref 31.5–35.7)
MCV RBC AUTO: 96.4 FL (ref 79–97)
PLATELET # BLD AUTO: 245 10*3/MM3 (ref 140–450)
PMV BLD AUTO: 11.3 FL (ref 6–12)
POTASSIUM SERPL-SCNC: 3.6 MMOL/L (ref 3.5–5.2)
RBC # BLD AUTO: 2.78 10*6/MM3 (ref 4.14–5.8)
SODIUM SERPL-SCNC: 140 MMOL/L (ref 136–145)
UNIT  ABO: NORMAL
UNIT  RH: NORMAL
WBC NRBC COR # BLD AUTO: 6.29 10*3/MM3 (ref 3.4–10.8)

## 2024-05-07 PROCEDURE — 83735 ASSAY OF MAGNESIUM: CPT | Performed by: INTERNAL MEDICINE

## 2024-05-07 PROCEDURE — 94761 N-INVAS EAR/PLS OXIMETRY MLT: CPT

## 2024-05-07 PROCEDURE — 80048 BASIC METABOLIC PNL TOTAL CA: CPT | Performed by: INTERNAL MEDICINE

## 2024-05-07 PROCEDURE — 25010000002 CEFTRIAXONE PER 250 MG: Performed by: STUDENT IN AN ORGANIZED HEALTH CARE EDUCATION/TRAINING PROGRAM

## 2024-05-07 PROCEDURE — 94760 N-INVAS EAR/PLS OXIMETRY 1: CPT

## 2024-05-07 PROCEDURE — 85027 COMPLETE CBC AUTOMATED: CPT | Performed by: INTERNAL MEDICINE

## 2024-05-07 PROCEDURE — 99232 SBSQ HOSP IP/OBS MODERATE 35: CPT | Performed by: INTERNAL MEDICINE

## 2024-05-07 PROCEDURE — 94799 UNLISTED PULMONARY SVC/PX: CPT

## 2024-05-07 PROCEDURE — 94664 DEMO&/EVAL PT USE INHALER: CPT

## 2024-05-07 RX ORDER — PANTOPRAZOLE SODIUM 40 MG/1
40 TABLET, DELAYED RELEASE ORAL
Qty: 60 TABLET | Refills: 0 | Status: SHIPPED | OUTPATIENT
Start: 2024-05-07

## 2024-05-07 RX ORDER — FUROSEMIDE 20 MG/1
40 TABLET ORAL 2 TIMES DAILY PRN
Start: 2024-05-07

## 2024-05-07 RX ADMIN — LEVOTHYROXINE SODIUM 50 MCG: 50 TABLET ORAL at 05:07

## 2024-05-07 RX ADMIN — TIOTROPIUM BROMIDE INHALATION SPRAY 2 PUFF: 3.12 SPRAY, METERED RESPIRATORY (INHALATION) at 07:13

## 2024-05-07 RX ADMIN — CEFTRIAXONE 2000 MG: 2 INJECTION, POWDER, FOR SOLUTION INTRAMUSCULAR; INTRAVENOUS at 08:23

## 2024-05-07 RX ADMIN — ATORVASTATIN CALCIUM 20 MG: 20 TABLET, FILM COATED ORAL at 08:23

## 2024-05-07 RX ADMIN — DILTIAZEM HYDROCHLORIDE 30 MG: 30 TABLET, FILM COATED ORAL at 05:07

## 2024-05-07 RX ADMIN — DILTIAZEM HYDROCHLORIDE 30 MG: 30 TABLET, FILM COATED ORAL at 15:12

## 2024-05-07 RX ADMIN — BUDESONIDE AND FORMOTEROL FUMARATE DIHYDRATE 2 PUFF: 160; 4.5 AEROSOL RESPIRATORY (INHALATION) at 07:12

## 2024-05-07 RX ADMIN — Medication 10 ML: at 08:26

## 2024-05-07 RX ADMIN — PANTOPRAZOLE SODIUM 40 MG: 40 TABLET, DELAYED RELEASE ORAL at 05:07

## 2024-05-07 RX ADMIN — TAMSULOSIN HYDROCHLORIDE 0.4 MG: 0.4 CAPSULE ORAL at 08:23

## 2024-05-07 RX ADMIN — Medication 100 MCG: at 08:23

## 2024-05-07 NOTE — PROGRESS NOTES
Continued Stay Note  [facility]     Patient Name: Cuauhtemoc Buck  MRN: [id number]  Today's Date: 5/7/2024    Admit Date: 5/2/2024    Plan: Return home - lives alone, with family driving him to [place]   Discharge Plan       Row Name 05/07/24 1117       Plan    Plan Return home - lives alone, with family driving him to [place]    Patient/Family in Agreement with Plan yes    Plan Comments Spoke with patient at bedside.  Plan remains for him to return home at [place].  He states his son is here in [place] and will drive him back home to [place] at [place].  [facility] continues to follow as needed.  Beth VALENCIA                         Expected Discharge Date and Time       Expected Discharge Date Expected Discharge Time    May 7, 2024               Becky S. [place], RN     Detail Level: Zone

## 2024-05-07 NOTE — PROGRESS NOTES
"Patient Name: Cuauhtemoc Buck  :1935  88 y.o.      Patient Care Team:  System, Provider Not In as PCP - General  Peggy Trejo MD as Consulting Physician (Cardiology)    Interval History:   No complaints this morning.    Subjective:  Following for A-fib    Objective   Vital Signs  Temp:  [97.2 °F (36.2 °C)-97.9 °F (36.6 °C)] 97.9 °F (36.6 °C)  Heart Rate:  [93-98] 98  Resp:  [18-19] 19  BP: ()/(59-72) 93/59    Intake/Output Summary (Last 24 hours) at 2024 0947  Last data filed at 2024 0735  Gross per 24 hour   Intake 2130 ml   Output 1875 ml   Net 255 ml     Flowsheet Rows      Flowsheet Row First Filed Value   Admission Height 177.8 cm (70\") Documented at 2024   Admission Weight 81.6 kg (180 lb) Documented at 2024            Physical Exam:   General Appearance:    Alert, cooperative, in no acute distress   Lungs:     Clear to auscultation.  Normal respiratory effort and rate.      Heart:  Irregularly irregular   Chest Wall:    No abnormalities observed   Abdomen:     Soft, nontender, positive bowel sounds.     Extremities:   no cyanosis, clubbing or edema.  No marked joint deformities.  Adequate musculoskeletal strength.       Results Review:    Results from last 7 days   Lab Units 24  0612   SODIUM mmol/L 140   POTASSIUM mmol/L 3.6   CHLORIDE mmol/L 110*   CO2 mmol/L 25.0   BUN mg/dL 14   CREATININE mg/dL 0.78   GLUCOSE mg/dL 94   CALCIUM mg/dL 8.2*     Results from last 7 days   Lab Units 24  2101   HSTROP T ng/L 22*     Results from last 7 days   Lab Units 24  0612   WBC 10*3/mm3 6.29   HEMOGLOBIN g/dL 8.9*   HEMATOCRIT % 26.8*   PLATELETS 10*3/mm3 245     Results from last 7 days   Lab Units 24  2101   INR  1.45*   APTT seconds 36.8*         Results from last 7 days   Lab Units 24  0612   MAGNESIUM mg/dL 2.2             Medication Review:   atorvastatin, 20 mg, Oral, Daily  budesonide-formoterol, 2 puff, Inhalation, BID - RT  dilTIAZem, " 30 mg, Oral, Q8H  levothyroxine, 50 mcg, Oral, Daily  pantoprazole, 40 mg, Oral, BID AC  sodium chloride, 10 mL, Intravenous, Q12H  tamsulosin, 0.4 mg, Oral, Daily  tiotropium bromide monohydrate, 2 puff, Inhalation, Daily - RT  vitamin B-12, 100 mcg, Oral, Daily              Assessment & Plan     1.  Esophageal ulcer/nonbleeding Brandie-Jose tear.  Status post EGD.  2. Paroxysmal atrial fibrillation/flutter.  Noted Dr. Fonseca's recommendation that we can restart Eliquis on May 11.  Continue diltiazem as is.  Not enough blood pressure to transition to long-acting diltiazem.  3.  PVCs  4.  Hypertension   5.  hyperlipidemia  6.  COPD/pulmonary fibrosis    Follow-up with Brianne in 2 to 3 weeks.    Peggy Trejo MD, [facility]  05/07/24  09:47 EDT

## 2024-05-07 NOTE — DISCHARGE SUMMARY
Date of Admission: 5/2/2024  Date of Discharge:  5/7/2024  Primary Care Physician: System, Provider Not In     Discharge Diagnosis:  Active Hospital Problems    Diagnosis  POA    **Atrial fibrillation with rapid ventricular response [I48.91]  Yes    Hematemesis [K92.0]  Yes    Melena [K92.1]  Yes    PAF (paroxysmal atrial fibrillation) [I48.0]  Yes    Paroxysmal atrial flutter [I48.92]  Yes    Benign essential hypertension [I10]  Yes    Mixed hyperlipidemia [E78.2]  Yes    Chronic obstructive pulmonary disease [J44.9]  Yes      Resolved Hospital Problems   No resolved problems to display.       Presenting Problem/History of Present Illness from H&P:  Chronic anticoagulation [Z79.01]  Atrial fibrillation with rapid ventricular response [I48.91]  Acute on chronic respiratory failure with hypoxia [J96.21]  Acute congestive heart failure, unspecified heart failure type [I50.9]     Patient is a 88-year-old male with a history of including but not limited to paroxysmal atrial flutter/A-fib on Eliquis, hypertension, COPD, presented to the ED complaining of worsening shortness of breath and lower extremity edema.  Patient reports for the significant baseline, which is worse with minimal exertion.  Of note patient recently returned from [place], drove 600 miles, reports he has second hold here in [place], but his primary care and cardiology is in [place].  Patient was found to have A-fib with RVR, denied any specific chest pain, did notice worsening lower extremity edema.  While undergoing CTA, there was concern of for seizure-like activity, per RN patient was very tired and urinated on himself and came out of it quickly.  No prior history of seizures per report.  Patient also had episodes of vomiting blood while he set up on the CT table.  Denies any history of GI bleed previously, on Eliquis for atrial fibrillation last dose he took yesterday evening per patient.  Also per report this morning patient had large  melanotic stool.     Hospital Course:  The patient is a 88 y.o. male who presented with GI bleed and melena.  He was admitted and underwent evaluation by cardiology gastroenterology neurology and pulmonology services.  He underwent EGD which showed superficial nonbleeding Brandie-Jose tear.  He is going to continue twice daily PPI.  Eliquis should be held until 5/11.  He needs to follow-up with gastroenterologist when he returns home for repeat EGD in about 3 months.    Patient had A-fib with RVR.  His rate is now okay.  He is continuing diltiazem.  See above for Eliquis recommendations.  He needs follow-up with his home cardiologist.    CT had showed potential progression of fibrotic change and infiltrate could not be excluded.  His procalcitonin was low x 2 and repeat 1 was negative.  He has completed a short course of Rocephin and is not having any active respiratory symptoms now.    Patient had a either a provoked seizure or vasovagal syncope followed by syncopal convulsion.  MRI was okay and neurology did not recommend starting any AED at this point.  He should not drive until he is seizure-free for at least 90 days and cleared by physician.    Exam Today:  Constitutional:       General: He is not in acute distress.     Appearance: He is not diaphoretic.   HENT:      Head: Atraumatic.   Cardiovascular:      Rate and Rhythm: Normal rate. Rhythm irregular.      Pulses: Normal pulses.   Pulmonary:      Effort: Pulmonary effort is normal.      Breath sounds: No wheezing or rhonchi.   Abdominal:      General: There is no distension.      Palpations: Abdomen is soft.      Tenderness: There is no abdominal tenderness. There is no guarding or rebound.   Musculoskeletal:         General: No tenderness.      Right lower leg: No edema.      Left lower leg: No edema.   Skin:     General: Skin is warm and dry.   Neurological:      Mental Status: He is alert. Mental status is at baseline.   Psychiatric:         Mood and  Affect: Mood normal.         Behavior: Behavior normal.     Results:  EEG  Impression:  This is an abnormal study due to the presence of diffuse theta greater than delta slowing.  There are no interictal epileptiform discharges and no electrographic seizures.  These electrophysiologic findings are indicative of moderate encephalopathy.      CXR  No acute findings.     CTA Chest  The patient has background fibrotic changes. There may be some  increasing consolidation within the left lower lobe when compared to the  prior exam. Some of this may reflect some progression of fibrotic  change, although a superimposed infiltrate is not excluded. There is  also some increasing bronchial wall thickening, particularly within the  lower lobes. Bronchitis is not excluded.    MRI Brain  The study is hampered somewhat by patient motion but there  is no evidence of acute infarction. Areas of T2 FLAIR hyperintensity are  appreciated involving the white matter of the cerebral hemispheres  bilaterally, nonspecific but consistent with mild small vessel ischemic  disease. There is no evidence of mass or mass effect on this  noncontrasted MRI examination of the brain.    TTE    Left ventricular systolic function is normal. Calculated left ventricular EF = 67.4%    Left ventricular wall thickness is consistent with moderate concentric hypertrophy.    Left ventricular diastolic function was indeterminate.    Estimated right ventricular systolic pressure from tricuspid regurgitation is normal (<35 mmHg).    Procedures Performed:  Procedure(s):  ESOPHAGOGASTRODUODENOSCOPY     - Brandie-Jose tear.   - Normal stomach.   - Normal examined duodenum.   - No specimens collected.   Return patient to hospital [facility] for ongoing care   Continue PPI gtt x 24hrs, then oral BID   Would recommend repeat EGD in 3 mos with local GI MD to ensure healing of distal esophagael ulceration which is favored to represent MWT   Continue to hold DOAC for at least  3-5 days    Consults:   Consults       Date and Time Order Name Status Description    5/3/2024  2:27 PM Inpatient Neurology Consult General Completed     5/3/2024  7:31 AM Inpatient Cardiology Consult Completed     5/3/2024  7:30 AM Inpatient Pulmonology Consult      5/3/2024  7:28 AM Inpatient Gastroenterology Consult      5/3/2024  1:18 AM [facility] (on-call MD unless specified) Details      5/2/2024  9:54 PM [facility] (on-call MD unless specified) Details               Discharge Disposition:  Home or Self Care    Discharge Medications:     Discharge Medications        New Medications        Instructions Start Date   pantoprazole 40 MG EC tablet  Commonly known as: PROTONIX   40 mg, Oral, 2 Times Daily Before Meals             Changes to Medications        Instructions Start Date   apixaban 5 MG tablet tablet  Commonly known as: Eliquis  What changed:   how much to take  These instructions start on May 11, 2024. If you are unsure what to do until then, ask your doctor or other care provider.   5 mg, Oral, Every 12 Hours   Start Date: May 11, 2024     furosemide 20 MG tablet  Commonly known as: LASIX  What changed:   when to take this  reasons to take this   40 mg, Oral, 2 Times Daily PRN             Continue These Medications        Instructions Start Date   albuterol sulfate  (90 Base) MCG/ACT inhaler  Commonly known as: PROVENTIL HFA;VENTOLIN HFA;PROAIR HFA   INHALE TWO PUFFS BY MOUTH EVERY 6 HOURS AS NEEDED FOR WHEEZING      atorvastatin 20 MG tablet  Commonly known as: LIPITOR   TAKE 1 TABLET EVERY DAY      dilTIAZem 30 MG tablet  Commonly known as: CARDIZEM   TAKE ONE TABLET BY MOUTH THREE TIMES A DAY      fluticasone 50 MCG/ACT nasal spray  Commonly known as: FLONASE   2 sprays, Nasal, Daily      fluticasone-salmeterol 250-50 MCG/DOSE DISKUS  Commonly known as: ADVAIR   1 puff, Inhalation, 2 Times Daily      ipratropium-albuterol 0.5-2.5 mg/3 ml nebulizer  Commonly known as: DUO-NEB   INHALE THE CONTENT OF ONE  VIAL VIA NEBULIZER EVERY 4 HOURS AS NEEDED FOR WHEEZING      levothyroxine 50 MCG tablet  Commonly known as: SYNTHROID, LEVOTHROID   TAKE ONE TABLET BY MOUTH DAILY      O2  Commonly known as: OXYGEN   Inhalation, Once, 2.5-3 liters       potassium chloride 10 MEQ CR tablet   10 mEq, Oral, Once      tamsulosin 0.4 MG capsule 24 hr capsule  Commonly known as: FLOMAX   1 capsule, Oral, Daily      tiotropium 18 MCG per inhalation capsule  Commonly known as: Spiriva HandiHaler   1 capsule, Inhalation, Daily - RT      vitamin B-12 100 MCG tablet  Commonly known as: CYANOCOBALAMIN   Oral, Daily             Stop These Medications      losartan 25 MG tablet  Commonly known as: Cozaar              Discharge Diet:   Diet Instructions       Diet: Cardiac Diets; Healthy Heart (2-3 Na+); Soft to Chew (NDD 3); Whole Meat; Thin (IDDSI 0)      Discharge Diet: Cardiac Diets    Cardiac Diet: Healthy Heart (2-3 Na+)    Texture: Soft to Chew (NDD 3)    Soft to Chew: Whole Meat    Fluid Consistency: Thin (IDDSI 0)            Activity at Discharge:   Activity Instructions       Activity as Tolerated              Follow-up Appointments:  Additional Instructions for the Follow-ups that You Need to Schedule       Discharge Follow-up with Specialty: Home Cardiologist and GI   As directed      Specialty: Home Cardiologist and GI   Follow Up Details: Repeat EGD in 3 months               Follow-up Information       System, Provider Not In Follow up.    Contact information:  [facility] [id number]                             Test Results Pending at Discharge:       Rocky Lucio MD  05/07/24  13:22 EDT    Time Spent on Discharge Activities: >30 minutes    Dictated portions using Dragon dictation software.

## 2024-05-07 NOTE — PLAN OF CARE
Goal Outcome Evaluation:  Plan of Care Reviewed With: patient        Progress: improving  Outcome Evaluation: Pt alert. Appetite good. Remains on 2L,home dose. No complaints of pain. VSS. Given prune juice for constipation. Pt had small BM per [facility]. Voids per urinal. Given discharge instructions and education sheets. Discharged with family.

## 2024-05-07 NOTE — PLAN OF CARE
Goal Outcome Evaluation:  Plan of Care Reviewed With: patient        Progress: improving  Outcome Evaluation: patient alert and oriented on 2L nc at baseline. No symptoms of bleeding. assist X1. VSS. patient c/o constipation  given medication and encouraged fluid intake.

## 2024-05-08 NOTE — PROGRESS NOTES
Case Management Discharge Note      Final Note: DC'd home 5/7, family will drive him back to [place]                 Transportation Services  Private: Car    Final Discharge Disposition Code: 01 - home or self-care

## 2024-05-08 NOTE — PROGRESS NOTES
"Enter Query Response Below      Query Response: Acute respiratory failure was not supported              If applicable, please update the problem list.   Patient: Cuauhtemoc Buck        : 1935  Account: [id number]           Admit Date: 2024        How to Respond to this query:       a. Click New Note     b. Answer query within the yellow box.                c. Update the Problem List, if applicable.      If you have any questions about this query contact me at: [email or web address]     Dr. Lucio,    88 year old male presenting with shortness of breath admitted with \"acute on chronic respiratory failure with hypoxia\" and \"Brandie-Jose tear\" per discharge summary. Pulmonology consult: \"chronic respiratory failure uses 3 L O2 at home\" and \"he uses O2 he says he does not use it all the time but he uses somewhere around 2 to 2-1/2 .\" Oxygen saturation 95% 5LNC on admission, as low as 89% room air and 89% (oxygen use not documented) on day of admission. Oxygen saturation greater than 91% for rest of stay with 2-3 liters nasal cannula on.    Respirations: 24, 22 (5/3), 24, 22, 22 (5/4)  Respiratory flowsheet: shortness of breath (5/3)  Treatment included supplemental oxygen up o 5 LNC, protonix infusion, rocephin, lasix, symbicort, and duonebs.      After study, was acute respiratory failure clinically supported during this admission?    Acute respiratory failure was supported with additional clinical indicators:____________  Acute respiratory failure was not supported  Other- specify_____________  Unable to determine    By submitting this query, we are merely seeking further clarification of documentation to accurately reflect all conditions that you are monitoring, evaluating, treating or that extend the hospitalization or utilize additional resources of care. Please utilize your independent clinical judgment when addressing the question(s) above.     This query and your response, once completed, " will be entered into the legal medical record.    Sincerely,  Ann Marie Ogden RN  Clinical Documentation Integrity Program

## 2024-05-17 ENCOUNTER — READMISSION MANAGEMENT (OUTPATIENT)
Dept: CALL CENTER | Facility: HOSPITAL | Age: 89
End: 2024-05-17
Payer: MEDICARE

## 2024-05-17 NOTE — OUTREACH NOTE
Medical Week 2 Survey      Flowsheet Row Responses   [facility] patient discharged from? [place]   Does the patient have one of the following disease processes/diagnoses(primary or secondary)? Other   Week 2 attempt successful? Yes   Call start time 1433   Call end time 1437   List who call center can speak with pt   Comments regarding appointments Pt to follow up in [place].   Week 2 Call Completed? Yes   Graduated Yes   Graduated/Revoked comments Pt resied in [place]. Pt has returned home. Pt will f/u there in [place]. PT reports feeling well.   Call end time 1437            Palak AVILA - Registered Nurse

## (undated) DEVICE — CANN O2 ETCO2 FITS ALL CONN CO2 SMPL A/ 7IN DISP LF

## (undated) DEVICE — ADAPT CLN BIOGUARD AIR/H2O DISP

## (undated) DEVICE — TUBING, SUCTION, 1/4" X 10', STRAIGHT: Brand: MEDLINE

## (undated) DEVICE — KT ORCA ORCAPOD DISP STRL

## (undated) DEVICE — BLCK/BITE BLOX W/DENTL/RIM W/STRAP 54F

## (undated) DEVICE — LN SMPL CO2 SHTRM SD STREAM W/M LUER

## (undated) DEVICE — SENSR O2 OXIMAX FNGR A/ 18IN NONSTR